# Patient Record
Sex: FEMALE | Race: BLACK OR AFRICAN AMERICAN | NOT HISPANIC OR LATINO | Employment: OTHER | URBAN - METROPOLITAN AREA
[De-identification: names, ages, dates, MRNs, and addresses within clinical notes are randomized per-mention and may not be internally consistent; named-entity substitution may affect disease eponyms.]

---

## 2023-02-17 PROBLEM — F10.10 ALCOHOL USE DISORDER, MILD: Status: ACTIVE | Noted: 2023-02-17

## 2023-02-17 PROBLEM — H40.1133 PRIMARY OPEN ANGLE GLAUCOMA OF BOTH EYES, SEVERE STAGE: Status: ACTIVE | Noted: 2023-02-17

## 2023-02-17 PROBLEM — H25.811 COMBINED FORM OF AGE-RELATED CATARACT, RIGHT EYE: Status: ACTIVE | Noted: 2023-02-17

## 2023-02-17 PROBLEM — K52.9 COLITIS: Status: ACTIVE | Noted: 2023-02-17

## 2023-02-17 PROBLEM — N18.9 CKD (CHRONIC KIDNEY DISEASE): Status: ACTIVE | Noted: 2023-02-17

## 2023-02-17 PROBLEM — J30.9 ALLERGIC RHINITIS DUE TO ALLERGEN: Status: ACTIVE | Noted: 2023-02-17

## 2023-02-17 PROBLEM — F32.A ANXIETY AND DEPRESSION: Status: ACTIVE | Noted: 2023-02-17

## 2023-02-17 PROBLEM — D22.9 SKIN MOLE: Status: ACTIVE | Noted: 2023-02-17

## 2023-02-17 PROBLEM — R00.2 PALPITATIONS: Status: ACTIVE | Noted: 2023-02-17

## 2023-02-17 PROBLEM — N76.0 VAGINITIS: Status: ACTIVE | Noted: 2023-02-17

## 2023-02-17 PROBLEM — H04.123 DRY EYES, BILATERAL: Status: ACTIVE | Noted: 2023-02-17

## 2023-02-17 PROBLEM — H01.006 BLEPHARITIS OF BOTH EYES: Status: ACTIVE | Noted: 2023-02-17

## 2023-02-17 PROBLEM — Q82.8 ACCESSORY SKIN TAGS: Status: ACTIVE | Noted: 2023-02-17

## 2023-02-17 PROBLEM — E78.5 DYSLIPIDEMIA: Status: ACTIVE | Noted: 2023-02-17

## 2023-02-17 PROBLEM — H47.013 ISCHEMIC OPTIC NEUROPATHY, BILATERAL: Status: ACTIVE | Noted: 2023-02-17

## 2023-02-17 PROBLEM — E61.2 MAGNESIUM DEFICIENCY: Status: ACTIVE | Noted: 2023-02-17

## 2023-02-17 PROBLEM — L67.9 HAIR CHANGES: Status: ACTIVE | Noted: 2023-02-17

## 2023-02-17 PROBLEM — E83.52 HYPERCALCEMIA: Status: ACTIVE | Noted: 2023-02-17

## 2023-02-17 PROBLEM — F41.9 ANXIETY AND DEPRESSION: Status: ACTIVE | Noted: 2023-02-17

## 2023-02-17 PROBLEM — H01.003 BLEPHARITIS OF BOTH EYES: Status: ACTIVE | Noted: 2023-02-17

## 2023-02-17 PROBLEM — H53.8 BLURRED VISION, RIGHT EYE: Status: ACTIVE | Noted: 2023-02-17

## 2023-02-17 PROBLEM — H40.1233 LOW-TENSION GLAUCOMA, BILATERAL, SEVERE STAGE: Status: ACTIVE | Noted: 2023-02-17

## 2023-02-17 PROBLEM — M25.562 LEFT KNEE PAIN: Status: ACTIVE | Noted: 2023-02-17

## 2023-02-17 PROBLEM — N89.8 VAGINAL DISCHARGE: Status: ACTIVE | Noted: 2023-02-17

## 2023-02-17 PROBLEM — E87.6 HYPOKALEMIA: Status: ACTIVE | Noted: 2023-02-17

## 2023-02-17 PROBLEM — R63.0 ANOREXIA: Status: ACTIVE | Noted: 2023-02-17

## 2023-02-17 PROBLEM — R23.2 HOT FLASHES: Status: ACTIVE | Noted: 2023-02-17

## 2023-02-17 PROBLEM — N76.4 ABSCESS OF LABIA: Status: ACTIVE | Noted: 2023-02-17

## 2023-02-17 PROBLEM — G62.9 NEUROPATHY: Status: ACTIVE | Noted: 2023-02-17

## 2023-02-17 PROBLEM — H25.812 COMBINED FORM OF AGE-RELATED CATARACT, LEFT EYE: Status: ACTIVE | Noted: 2023-02-17

## 2023-02-17 PROBLEM — I10 HTN (HYPERTENSION): Status: ACTIVE | Noted: 2023-02-17

## 2023-02-17 PROBLEM — I73.9 CLAUDICATION (CMS-HCC): Status: ACTIVE | Noted: 2023-02-17

## 2023-02-17 PROBLEM — R19.7 DIARRHEA: Status: ACTIVE | Noted: 2023-02-17

## 2023-02-17 PROBLEM — H40.059 ELEVATED IOP: Status: ACTIVE | Noted: 2023-02-17

## 2023-02-17 PROBLEM — R20.2 NUMBNESS AND TINGLING OF FOOT: Status: ACTIVE | Noted: 2023-02-17

## 2023-02-17 PROBLEM — L30.9 ECZEMA: Status: ACTIVE | Noted: 2023-02-17

## 2023-02-17 PROBLEM — D25.9 UTERINE LEIOMYOMA: Status: ACTIVE | Noted: 2023-02-17

## 2023-02-17 PROBLEM — H52.7 REFRACTION ERROR: Status: ACTIVE | Noted: 2023-02-17

## 2023-02-17 PROBLEM — R12 HEARTBURN SYMPTOM: Status: ACTIVE | Noted: 2023-02-17

## 2023-02-17 PROBLEM — R53.83 FATIGUE: Status: ACTIVE | Noted: 2023-02-17

## 2023-02-17 PROBLEM — N63.10 BREAST MASS, RIGHT: Status: ACTIVE | Noted: 2023-02-17

## 2023-02-17 PROBLEM — I73.9 PERIPHERAL VASCULAR DISEASE (CMS-HCC): Status: ACTIVE | Noted: 2023-02-17

## 2023-02-17 PROBLEM — E55.9 VITAMIN D DEFICIENCY, UNSPECIFIED: Status: ACTIVE | Noted: 2023-02-17

## 2023-02-17 PROBLEM — B35.1 ONYCHOMYCOSIS OF TOENAIL: Status: ACTIVE | Noted: 2023-02-17

## 2023-02-17 PROBLEM — E21.0 PRIMARY HYPERPARATHYROIDISM (MULTI): Status: ACTIVE | Noted: 2023-02-17

## 2023-02-17 PROBLEM — R10.11 ABDOMINAL PAIN, RIGHT UPPER QUADRANT: Status: ACTIVE | Noted: 2023-02-17

## 2023-02-17 PROBLEM — R20.0 NUMBNESS AND TINGLING OF FOOT: Status: ACTIVE | Noted: 2023-02-17

## 2023-02-17 PROBLEM — H25.11 AGE-RELATED NUCLEAR CATARACT OF RIGHT EYE: Status: ACTIVE | Noted: 2023-02-17

## 2023-02-17 RX ORDER — BRIMONIDINE TARTRATE 2 MG/ML
1 SOLUTION/ DROPS OPHTHALMIC EVERY 12 HOURS
COMMUNITY
Start: 2016-11-15

## 2023-02-17 RX ORDER — LATANOPROSTENE BUNOD 0.24 MG/ML
SOLUTION/ DROPS OPHTHALMIC
COMMUNITY
End: 2024-05-15 | Stop reason: HOSPADM

## 2023-02-17 RX ORDER — LISINOPRIL 20 MG/1
20 TABLET ORAL DAILY
COMMUNITY
Start: 2020-06-16 | End: 2024-05-15 | Stop reason: HOSPADM

## 2023-02-17 RX ORDER — ACETAMINOPHEN 325 MG/1
2 TABLET ORAL EVERY 4 HOURS PRN
COMMUNITY
Start: 2020-06-16 | End: 2024-05-15 | Stop reason: HOSPADM

## 2023-02-17 RX ORDER — ATORVASTATIN CALCIUM 20 MG/1
20 TABLET, FILM COATED ORAL DAILY
COMMUNITY
Start: 2020-04-17

## 2023-02-17 RX ORDER — FAMOTIDINE 20 MG/1
20 TABLET, FILM COATED ORAL 2 TIMES DAILY
COMMUNITY
End: 2024-05-15 | Stop reason: HOSPADM

## 2023-02-17 RX ORDER — FOLIC ACID 1 MG/1
1 TABLET ORAL DAILY
COMMUNITY
Start: 2022-05-30 | End: 2024-05-15 | Stop reason: HOSPADM

## 2023-02-17 RX ORDER — AMLODIPINE BESYLATE 10 MG/1
10 TABLET ORAL DAILY
COMMUNITY
Start: 2020-08-27 | End: 2024-05-15 | Stop reason: HOSPADM

## 2023-02-17 RX ORDER — CHOLECALCIFEROL (VITAMIN D3) 25 MCG
1 TABLET ORAL
COMMUNITY
Start: 2022-05-03

## 2023-02-17 RX ORDER — LANOLIN ALCOHOL/MO/W.PET/CERES
100 CREAM (GRAM) TOPICAL DAILY
COMMUNITY
Start: 2022-05-30

## 2023-04-14 ENCOUNTER — APPOINTMENT (OUTPATIENT)
Dept: PRIMARY CARE | Facility: CLINIC | Age: 63
End: 2023-04-14
Payer: MEDICAID

## 2024-05-07 ENCOUNTER — APPOINTMENT (OUTPATIENT)
Dept: RADIOLOGY | Facility: HOSPITAL | Age: 64
End: 2024-05-07
Payer: MEDICAID

## 2024-05-07 ENCOUNTER — HOSPITAL ENCOUNTER (INPATIENT)
Facility: HOSPITAL | Age: 64
LOS: 8 days | Discharge: NURSING, REHAB OR LONG-TERM CARE | End: 2024-05-15
Attending: EMERGENCY MEDICINE | Admitting: INTERNAL MEDICINE
Payer: MEDICAID

## 2024-05-07 DIAGNOSIS — G62.9 NEUROPATHY: ICD-10-CM

## 2024-05-07 DIAGNOSIS — A41.9 SEPSIS, DUE TO UNSPECIFIED ORGANISM, UNSPECIFIED WHETHER ACUTE ORGAN DYSFUNCTION PRESENT (MULTI): Primary | ICD-10-CM

## 2024-05-07 DIAGNOSIS — N39.0 URINARY TRACT INFECTION WITHOUT HEMATURIA, SITE UNSPECIFIED: ICD-10-CM

## 2024-05-07 DIAGNOSIS — J90 PLEURAL EFFUSION DUE TO ANOTHER DISORDER: ICD-10-CM

## 2024-05-07 DIAGNOSIS — J96.21 ACUTE ON CHRONIC RESPIRATORY FAILURE WITH HYPOXIA (MULTI): ICD-10-CM

## 2024-05-07 DIAGNOSIS — F32.89 OTHER DEPRESSION: ICD-10-CM

## 2024-05-07 DIAGNOSIS — C15.9 MALIGNANT NEOPLASM OF ESOPHAGUS, UNSPECIFIED LOCATION (MULTI): ICD-10-CM

## 2024-05-07 DIAGNOSIS — J18.9 HEALTHCARE-ASSOCIATED PNEUMONIA: ICD-10-CM

## 2024-05-07 DIAGNOSIS — J96.01 ACUTE RESPIRATORY FAILURE WITH HYPOXIA (MULTI): ICD-10-CM

## 2024-05-07 DIAGNOSIS — I50.9 CONGESTIVE HEART FAILURE, UNSPECIFIED HF CHRONICITY, UNSPECIFIED HEART FAILURE TYPE (MULTI): ICD-10-CM

## 2024-05-07 LAB
ALBUMIN SERPL-MCNC: 3 G/DL (ref 3.5–5)
ALP BLD-CCNC: 90 U/L (ref 35–125)
ALT SERPL-CCNC: 9 U/L (ref 5–40)
ANION GAP BLDA CALCULATED.4IONS-SCNC: 12 MMO/L (ref 10–25)
ANION GAP BLDA CALCULATED.4IONS-SCNC: 9 MMO/L (ref 10–25)
ANION GAP SERPL CALC-SCNC: 12 MMOL/L
APPEARANCE UR: ABNORMAL
ARTERIAL PATENCY WRIST A: POSITIVE
AST SERPL-CCNC: 14 U/L (ref 5–40)
BACTERIA #/AREA URNS AUTO: ABNORMAL /HPF
BASE EXCESS BLDA CALC-SCNC: -0.7 MMOL/L (ref -2–3)
BASE EXCESS BLDA CALC-SCNC: 1.1 MMOL/L (ref -2–3)
BASOPHILS # BLD AUTO: 0.02 X10*3/UL (ref 0–0.1)
BASOPHILS NFR BLD AUTO: 0.3 %
BILIRUB SERPL-MCNC: 0.3 MG/DL (ref 0.1–1.2)
BILIRUB UR STRIP.AUTO-MCNC: NEGATIVE MG/DL
BODY TEMPERATURE: 37 DEGREES CELSIUS
BODY TEMPERATURE: 37 DEGREES CELSIUS
BUN SERPL-MCNC: 33 MG/DL (ref 8–25)
CA-I BLDA-SCNC: 1.24 MMOL/L (ref 1.1–1.33)
CA-I BLDA-SCNC: 1.31 MMOL/L (ref 1.1–1.33)
CALCIUM SERPL-MCNC: 9.7 MG/DL (ref 8.5–10.4)
CHLORIDE BLDA-SCNC: 104 MMOL/L (ref 98–107)
CHLORIDE BLDA-SCNC: 105 MMOL/L (ref 98–107)
CHLORIDE SERPL-SCNC: 100 MMOL/L (ref 97–107)
CO2 SERPL-SCNC: 24 MMOL/L (ref 24–31)
COLOR UR: ABNORMAL
CREAT SERPL-MCNC: 0.9 MG/DL (ref 0.4–1.6)
D DIMER PPP FEU-MCNC: 3.09 MG/L FEU (ref 0.19–0.5)
EGFRCR SERPLBLD CKD-EPI 2021: 72 ML/MIN/1.73M*2
EOSINOPHIL # BLD AUTO: 0.3 X10*3/UL (ref 0–0.7)
EOSINOPHIL NFR BLD AUTO: 3.8 %
ERYTHROCYTE [DISTWIDTH] IN BLOOD BY AUTOMATED COUNT: 18.8 % (ref 11.5–14.5)
FLUAV RNA RESP QL NAA+PROBE: NOT DETECTED
FLUBV RNA RESP QL NAA+PROBE: NOT DETECTED
GLUCOSE BLDA-MCNC: 102 MG/DL (ref 74–99)
GLUCOSE BLDA-MCNC: 155 MG/DL (ref 74–99)
GLUCOSE SERPL-MCNC: 96 MG/DL (ref 65–99)
GLUCOSE UR STRIP.AUTO-MCNC: NORMAL MG/DL
HCO3 BLDA-SCNC: 21.2 MMOL/L (ref 22–26)
HCO3 BLDA-SCNC: 25 MMOL/L (ref 22–26)
HCT VFR BLD AUTO: 32.5 % (ref 36–46)
HCT VFR BLD EST: 28 % (ref 36–46)
HCT VFR BLD EST: 33 % (ref 36–46)
HGB BLD-MCNC: 10 G/DL (ref 12–16)
HGB BLDA-MCNC: 11 G/DL (ref 12–16)
HGB BLDA-MCNC: 9.4 G/DL (ref 12–16)
HOLD SPECIMEN: NORMAL
HOLD SPECIMEN: NORMAL
IMM GRANULOCYTES # BLD AUTO: 0.03 X10*3/UL (ref 0–0.7)
IMM GRANULOCYTES NFR BLD AUTO: 0.4 % (ref 0–0.9)
INHALED O2 CONCENTRATION: 80 %
INHALED O2 CONCENTRATION: 90 %
KETONES UR STRIP.AUTO-MCNC: NEGATIVE MG/DL
LACTATE BLDA-SCNC: 0.6 MMOL/L (ref 0.4–2)
LACTATE BLDA-SCNC: 3.3 MMOL/L (ref 0.4–2)
LEUKOCYTE ESTERASE UR QL STRIP.AUTO: ABNORMAL
LYMPHOCYTES # BLD AUTO: 0.64 X10*3/UL (ref 1.2–4.8)
LYMPHOCYTES NFR BLD AUTO: 8.1 %
MAGNESIUM SERPL-MCNC: 1.7 MG/DL (ref 1.6–3.1)
MCH RBC QN AUTO: 25.3 PG (ref 26–34)
MCHC RBC AUTO-ENTMCNC: 30.8 G/DL (ref 32–36)
MCV RBC AUTO: 82 FL (ref 80–100)
MONOCYTES # BLD AUTO: 0.75 X10*3/UL (ref 0.1–1)
MONOCYTES NFR BLD AUTO: 9.5 %
MRSA DNA SPEC QL NAA+PROBE: NOT DETECTED
MUCOUS THREADS #/AREA URNS AUTO: ABNORMAL /LPF
NEUTROPHILS # BLD AUTO: 6.14 X10*3/UL (ref 1.2–7.7)
NEUTROPHILS NFR BLD AUTO: 77.9 %
NITRITE UR QL STRIP.AUTO: NEGATIVE
NRBC BLD-RTO: 0 /100 WBCS (ref 0–0)
NT-PROBNP SERPL-MCNC: 1649 PG/ML (ref 0–226)
OXYHGB MFR BLDA: 90.3 % (ref 94–98)
OXYHGB MFR BLDA: 97.1 % (ref 94–98)
PCO2 BLDA: 26 MM HG (ref 38–42)
PCO2 BLDA: 36 MM HG (ref 38–42)
PH BLDA: 7.45 PH (ref 7.38–7.42)
PH BLDA: 7.52 PH (ref 7.38–7.42)
PH UR STRIP.AUTO: 7 [PH]
PLATELET # BLD AUTO: 207 X10*3/UL (ref 150–450)
PO2 BLDA: 64 MM HG (ref 85–95)
PO2 BLDA: 93 MM HG (ref 85–95)
POTASSIUM BLDA-SCNC: 3.3 MMOL/L (ref 3.5–5.3)
POTASSIUM BLDA-SCNC: 4 MMOL/L (ref 3.5–5.3)
POTASSIUM SERPL-SCNC: 4.2 MMOL/L (ref 3.4–5.1)
PROT SERPL-MCNC: 6.8 G/DL (ref 5.9–7.9)
PROT UR STRIP.AUTO-MCNC: NEGATIVE MG/DL
RBC # BLD AUTO: 3.96 X10*6/UL (ref 4–5.2)
RBC # UR STRIP.AUTO: NEGATIVE /UL
RBC #/AREA URNS AUTO: ABNORMAL /HPF
SAO2 % BLDA: 100 % (ref 94–100)
SAO2 % BLDA: 94 % (ref 94–100)
SARS-COV-2 RNA RESP QL NAA+PROBE: NOT DETECTED
SODIUM BLDA-SCNC: 134 MMOL/L (ref 136–145)
SODIUM BLDA-SCNC: 135 MMOL/L (ref 136–145)
SODIUM SERPL-SCNC: 136 MMOL/L (ref 133–145)
SP GR UR STRIP.AUTO: 1.01
SPECIMEN DRAWN FROM PATIENT: ABNORMAL
SQUAMOUS #/AREA URNS AUTO: ABNORMAL /HPF
TROPONIN T SERPL-MCNC: 14 NG/L
TROPONIN T SERPL-MCNC: 16 NG/L
TROPONIN T SERPL-MCNC: 16 NG/L
UROBILINOGEN UR STRIP.AUTO-MCNC: NORMAL MG/DL
WBC # BLD AUTO: 7.9 X10*3/UL (ref 4.4–11.3)
WBC #/AREA URNS AUTO: >50 /HPF
WBC CLUMPS #/AREA URNS AUTO: ABNORMAL /HPF

## 2024-05-07 PROCEDURE — 9420000001 HC RT PATIENT EDUCATION 5 MIN

## 2024-05-07 PROCEDURE — 94799 UNLISTED PULMONARY SVC/PX: CPT

## 2024-05-07 PROCEDURE — 2500000004 HC RX 250 GENERAL PHARMACY W/ HCPCS (ALT 636 FOR OP/ED): Performed by: INTERNAL MEDICINE

## 2024-05-07 PROCEDURE — 71045 X-RAY EXAM CHEST 1 VIEW: CPT | Performed by: STUDENT IN AN ORGANIZED HEALTH CARE EDUCATION/TRAINING PROGRAM

## 2024-05-07 PROCEDURE — 99285 EMERGENCY DEPT VISIT HI MDM: CPT | Mod: 25,CS

## 2024-05-07 PROCEDURE — 84132 ASSAY OF SERUM POTASSIUM: CPT | Performed by: EMERGENCY MEDICINE

## 2024-05-07 PROCEDURE — 84484 ASSAY OF TROPONIN QUANT: CPT | Performed by: EMERGENCY MEDICINE

## 2024-05-07 PROCEDURE — 99291 CRITICAL CARE FIRST HOUR: CPT

## 2024-05-07 PROCEDURE — 81003 URINALYSIS AUTO W/O SCOPE: CPT | Performed by: EMERGENCY MEDICINE

## 2024-05-07 PROCEDURE — 36600 WITHDRAWAL OF ARTERIAL BLOOD: CPT

## 2024-05-07 PROCEDURE — 71045 X-RAY EXAM CHEST 1 VIEW: CPT

## 2024-05-07 PROCEDURE — 94660 CPAP INITIATION&MGMT: CPT

## 2024-05-07 PROCEDURE — 96375 TX/PRO/DX INJ NEW DRUG ADDON: CPT

## 2024-05-07 PROCEDURE — 2500000004 HC RX 250 GENERAL PHARMACY W/ HCPCS (ALT 636 FOR OP/ED): Performed by: EMERGENCY MEDICINE

## 2024-05-07 PROCEDURE — 2500000001 HC RX 250 WO HCPCS SELF ADMINISTERED DRUGS (ALT 637 FOR MEDICARE OP): Performed by: EMERGENCY MEDICINE

## 2024-05-07 PROCEDURE — 2500000001 HC RX 250 WO HCPCS SELF ADMINISTERED DRUGS (ALT 637 FOR MEDICARE OP): Performed by: INTERNAL MEDICINE

## 2024-05-07 PROCEDURE — 99223 1ST HOSP IP/OBS HIGH 75: CPT | Performed by: NURSE PRACTITIONER

## 2024-05-07 PROCEDURE — 87899 AGENT NOS ASSAY W/OPTIC: CPT | Mod: WESLAB | Performed by: INTERNAL MEDICINE

## 2024-05-07 PROCEDURE — 87641 MR-STAPH DNA AMP PROBE: CPT | Performed by: INTERNAL MEDICINE

## 2024-05-07 PROCEDURE — 71260 CT THORAX DX C+: CPT

## 2024-05-07 PROCEDURE — 85025 COMPLETE CBC W/AUTO DIFF WBC: CPT | Performed by: EMERGENCY MEDICINE

## 2024-05-07 PROCEDURE — 2550000001 HC RX 255 CONTRASTS: Performed by: EMERGENCY MEDICINE

## 2024-05-07 PROCEDURE — 83880 ASSAY OF NATRIURETIC PEPTIDE: CPT | Performed by: EMERGENCY MEDICINE

## 2024-05-07 PROCEDURE — 36415 COLL VENOUS BLD VENIPUNCTURE: CPT | Performed by: EMERGENCY MEDICINE

## 2024-05-07 PROCEDURE — 96361 HYDRATE IV INFUSION ADD-ON: CPT

## 2024-05-07 PROCEDURE — 87449 NOS EACH ORGANISM AG IA: CPT | Mod: WESLAB | Performed by: INTERNAL MEDICINE

## 2024-05-07 PROCEDURE — 96374 THER/PROPH/DIAG INJ IV PUSH: CPT | Mod: 59

## 2024-05-07 PROCEDURE — 2020000001 HC ICU ROOM DAILY

## 2024-05-07 PROCEDURE — 87040 BLOOD CULTURE FOR BACTERIA: CPT | Mod: WESLAB | Performed by: EMERGENCY MEDICINE

## 2024-05-07 PROCEDURE — 99291 CRITICAL CARE FIRST HOUR: CPT | Mod: CS | Performed by: EMERGENCY MEDICINE

## 2024-05-07 PROCEDURE — 94664 DEMO&/EVAL PT USE INHALER: CPT

## 2024-05-07 PROCEDURE — 2500000002 HC RX 250 W HCPCS SELF ADMINISTERED DRUGS (ALT 637 FOR MEDICARE OP, ALT 636 FOR OP/ED): Performed by: INTERNAL MEDICINE

## 2024-05-07 PROCEDURE — 87636 SARSCOV2 & INF A&B AMP PRB: CPT | Performed by: EMERGENCY MEDICINE

## 2024-05-07 PROCEDURE — 2500000002 HC RX 250 W HCPCS SELF ADMINISTERED DRUGS (ALT 637 FOR MEDICARE OP, ALT 636 FOR OP/ED)

## 2024-05-07 PROCEDURE — 84132 ASSAY OF SERUM POTASSIUM: CPT | Performed by: INTERNAL MEDICINE

## 2024-05-07 PROCEDURE — 94640 AIRWAY INHALATION TREATMENT: CPT

## 2024-05-07 PROCEDURE — 2500000001 HC RX 250 WO HCPCS SELF ADMINISTERED DRUGS (ALT 637 FOR MEDICARE OP): Performed by: NURSE PRACTITIONER

## 2024-05-07 PROCEDURE — 87086 URINE CULTURE/COLONY COUNT: CPT | Mod: WESLAB | Performed by: EMERGENCY MEDICINE

## 2024-05-07 PROCEDURE — 2500000005 HC RX 250 GENERAL PHARMACY W/O HCPCS: Performed by: EMERGENCY MEDICINE

## 2024-05-07 PROCEDURE — 83735 ASSAY OF MAGNESIUM: CPT | Performed by: EMERGENCY MEDICINE

## 2024-05-07 PROCEDURE — 96365 THER/PROPH/DIAG IV INF INIT: CPT

## 2024-05-07 PROCEDURE — 85300 ANTITHROMBIN III ACTIVITY: CPT | Performed by: EMERGENCY MEDICINE

## 2024-05-07 RX ORDER — ACETYLCYSTEINE 200 MG/ML
3 SOLUTION ORAL; RESPIRATORY (INHALATION)
Status: DISCONTINUED | OUTPATIENT
Start: 2024-05-07 | End: 2024-05-11

## 2024-05-07 RX ORDER — HYDROXYZINE HYDROCHLORIDE 25 MG/1
25 TABLET, FILM COATED ORAL EVERY 6 HOURS PRN
Status: DISCONTINUED | OUTPATIENT
Start: 2024-05-07 | End: 2024-05-15 | Stop reason: HOSPADM

## 2024-05-07 RX ORDER — ATORVASTATIN CALCIUM 20 MG/1
20 TABLET, FILM COATED ORAL NIGHTLY
Status: DISCONTINUED | OUTPATIENT
Start: 2024-05-07 | End: 2024-05-15

## 2024-05-07 RX ORDER — FUROSEMIDE 10 MG/ML
40 INJECTION INTRAMUSCULAR; INTRAVENOUS ONCE
Status: COMPLETED | OUTPATIENT
Start: 2024-05-07 | End: 2024-05-07

## 2024-05-07 RX ORDER — ENOXAPARIN SODIUM 100 MG/ML
40 INJECTION SUBCUTANEOUS DAILY
Status: DISCONTINUED | OUTPATIENT
Start: 2024-05-07 | End: 2024-05-15 | Stop reason: HOSPADM

## 2024-05-07 RX ORDER — FENTANYL 50 UG/1
1 PATCH TRANSDERMAL
Status: DISCONTINUED | OUTPATIENT
Start: 2024-05-07 | End: 2024-05-15 | Stop reason: HOSPADM

## 2024-05-07 RX ORDER — VANCOMYCIN 1.5 G/300ML
1.5 INJECTION, SOLUTION INTRAVENOUS ONCE
Status: COMPLETED | OUTPATIENT
Start: 2024-05-07 | End: 2024-05-07

## 2024-05-07 RX ORDER — ACETAMINOPHEN 160 MG/5ML
650 SOLUTION ORAL EVERY 4 HOURS PRN
Status: DISCONTINUED | OUTPATIENT
Start: 2024-05-07 | End: 2024-05-15 | Stop reason: HOSPADM

## 2024-05-07 RX ORDER — LEVETIRACETAM 100 MG/ML
7.5 SOLUTION ORAL
COMMUNITY

## 2024-05-07 RX ORDER — METHOCARBAMOL 500 MG/1
500 TABLET, FILM COATED ORAL 3 TIMES DAILY
Status: DISCONTINUED | OUTPATIENT
Start: 2024-05-07 | End: 2024-05-07

## 2024-05-07 RX ORDER — HYDROXYZINE HYDROCHLORIDE 25 MG/1
25 TABLET, FILM COATED ORAL EVERY 6 HOURS PRN
COMMUNITY

## 2024-05-07 RX ORDER — GABAPENTIN 250 MG/5ML
250 SOLUTION ORAL 2 TIMES DAILY
Status: DISCONTINUED | OUTPATIENT
Start: 2024-05-07 | End: 2024-05-15 | Stop reason: HOSPADM

## 2024-05-07 RX ORDER — AMLODIPINE BESYLATE 10 MG/1
10 TABLET ORAL DAILY
Status: DISCONTINUED | OUTPATIENT
Start: 2024-05-07 | End: 2024-05-13

## 2024-05-07 RX ORDER — GABAPENTIN 250 MG/5ML
500 SOLUTION ORAL NIGHTLY
Status: DISCONTINUED | OUTPATIENT
Start: 2024-05-07 | End: 2024-05-15 | Stop reason: HOSPADM

## 2024-05-07 RX ORDER — METHOCARBAMOL 500 MG/1
500 TABLET, FILM COATED ORAL 3 TIMES DAILY
COMMUNITY
End: 2024-05-15 | Stop reason: HOSPADM

## 2024-05-07 RX ORDER — VANCOMYCIN HYDROCHLORIDE 1 G/20ML
INJECTION, POWDER, LYOPHILIZED, FOR SOLUTION INTRAVENOUS DAILY PRN
Status: DISCONTINUED | OUTPATIENT
Start: 2024-05-07 | End: 2024-05-12

## 2024-05-07 RX ORDER — ENOXAPARIN SODIUM 100 MG/ML
40 INJECTION SUBCUTANEOUS EVERY 24 HOURS
Status: DISCONTINUED | OUTPATIENT
Start: 2024-05-08 | End: 2024-05-07 | Stop reason: SDUPTHER

## 2024-05-07 RX ORDER — LISINOPRIL 20 MG/1
20 TABLET ORAL DAILY
Status: DISCONTINUED | OUTPATIENT
Start: 2024-05-07 | End: 2024-05-14

## 2024-05-07 RX ORDER — FAMOTIDINE 10 MG/ML
20 INJECTION INTRAVENOUS ONCE
Status: COMPLETED | OUTPATIENT
Start: 2024-05-07 | End: 2024-05-07

## 2024-05-07 RX ORDER — PANTOPRAZOLE SODIUM 40 MG/1
40 TABLET, DELAYED RELEASE ORAL
COMMUNITY
End: 2024-05-15 | Stop reason: HOSPADM

## 2024-05-07 RX ORDER — LEVETIRACETAM 100 MG/ML
750 SOLUTION ORAL 2 TIMES DAILY
Status: DISCONTINUED | OUTPATIENT
Start: 2024-05-07 | End: 2024-05-15 | Stop reason: HOSPADM

## 2024-05-07 RX ORDER — BRIMONIDINE TARTRATE 2 MG/ML
1 SOLUTION/ DROPS OPHTHALMIC 2 TIMES DAILY
Status: DISCONTINUED | OUTPATIENT
Start: 2024-05-07 | End: 2024-05-15 | Stop reason: HOSPADM

## 2024-05-07 RX ORDER — SENNOSIDES 8.6 MG/1
2 TABLET ORAL 2 TIMES DAILY
Status: DISCONTINUED | OUTPATIENT
Start: 2024-05-07 | End: 2024-05-08

## 2024-05-07 RX ORDER — METHOCARBAMOL 500 MG/1
500 TABLET, FILM COATED ORAL EVERY 8 HOURS PRN
Status: DISCONTINUED | OUTPATIENT
Start: 2024-05-07 | End: 2024-05-15 | Stop reason: HOSPADM

## 2024-05-07 RX ORDER — FUROSEMIDE 10 MG/ML
40 INJECTION INTRAMUSCULAR; INTRAVENOUS DAILY
Status: DISCONTINUED | OUTPATIENT
Start: 2024-05-07 | End: 2024-05-13

## 2024-05-07 RX ORDER — FENTANYL 12.5 UG/1
1 PATCH TRANSDERMAL
Status: DISCONTINUED | OUTPATIENT
Start: 2024-05-07 | End: 2024-05-15 | Stop reason: HOSPADM

## 2024-05-07 RX ORDER — ONDANSETRON HYDROCHLORIDE 8 MG/1
8 TABLET, FILM COATED ORAL EVERY 8 HOURS PRN
COMMUNITY

## 2024-05-07 RX ORDER — NAPROXEN SODIUM 220 MG/1
81 TABLET, FILM COATED ORAL ONCE
Status: DISCONTINUED | OUTPATIENT
Start: 2024-05-07 | End: 2024-05-15 | Stop reason: HOSPADM

## 2024-05-07 RX ORDER — NAPROXEN SODIUM 220 MG/1
324 TABLET, FILM COATED ORAL ONCE
Status: COMPLETED | OUTPATIENT
Start: 2024-05-07 | End: 2024-05-07

## 2024-05-07 RX ORDER — ACETAMINOPHEN 325 MG/1
650 TABLET ORAL EVERY 4 HOURS PRN
Status: DISCONTINUED | OUTPATIENT
Start: 2024-05-07 | End: 2024-05-15 | Stop reason: HOSPADM

## 2024-05-07 RX ORDER — LANOLIN ALCOHOL/MO/W.PET/CERES
100 CREAM (GRAM) TOPICAL DAILY
Status: DISCONTINUED | OUTPATIENT
Start: 2024-05-07 | End: 2024-05-15

## 2024-05-07 RX ORDER — PANTOPRAZOLE SODIUM 40 MG/1
40 TABLET, DELAYED RELEASE ORAL DAILY
Status: DISCONTINUED | OUTPATIENT
Start: 2024-05-07 | End: 2024-05-08

## 2024-05-07 RX ORDER — ONDANSETRON 4 MG/1
8 TABLET, FILM COATED ORAL EVERY 8 HOURS PRN
Status: DISCONTINUED | OUTPATIENT
Start: 2024-05-07 | End: 2024-05-15 | Stop reason: HOSPADM

## 2024-05-07 RX ORDER — SENNOSIDES 8.6 MG/1
2 TABLET ORAL 2 TIMES DAILY
COMMUNITY
End: 2024-05-15 | Stop reason: HOSPADM

## 2024-05-07 RX ORDER — VANCOMYCIN 1 G/200ML
1 INJECTION, SOLUTION INTRAVENOUS EVERY 24 HOURS
Status: DISCONTINUED | OUTPATIENT
Start: 2024-05-08 | End: 2024-05-12

## 2024-05-07 RX ORDER — ACETAMINOPHEN 650 MG/1
650 SUPPOSITORY RECTAL EVERY 4 HOURS PRN
Status: DISCONTINUED | OUTPATIENT
Start: 2024-05-07 | End: 2024-05-15 | Stop reason: HOSPADM

## 2024-05-07 RX ORDER — POLYETHYLENE GLYCOL 3350 17 G/17G
17 POWDER, FOR SOLUTION ORAL DAILY
Status: DISCONTINUED | OUTPATIENT
Start: 2024-05-07 | End: 2024-05-09

## 2024-05-07 RX ORDER — IPRATROPIUM BROMIDE AND ALBUTEROL SULFATE 2.5; .5 MG/3ML; MG/3ML
3 SOLUTION RESPIRATORY (INHALATION)
Status: DISCONTINUED | OUTPATIENT
Start: 2024-05-07 | End: 2024-05-13

## 2024-05-07 RX ADMIN — ACETAMINOPHEN 650 MG: 160 SOLUTION ORAL at 20:15

## 2024-05-07 RX ADMIN — SENNOSIDES 17.2 MG: 8.6 TABLET, FILM COATED ORAL at 20:15

## 2024-05-07 RX ADMIN — Medication 100 MG: at 09:52

## 2024-05-07 RX ADMIN — HYDROXYZINE HYDROCHLORIDE 25 MG: 25 TABLET, FILM COATED ORAL at 12:19

## 2024-05-07 RX ADMIN — FAMOTIDINE 20 MG: 10 INJECTION INTRAVENOUS at 02:35

## 2024-05-07 RX ADMIN — GABAPENTIN 250 MG: 250 SOLUTION ORAL at 16:43

## 2024-05-07 RX ADMIN — ASPIRIN 81 MG CHEWABLE TABLET 324 MG: 81 TABLET CHEWABLE at 02:35

## 2024-05-07 RX ADMIN — PIPERACILLIN SODIUM AND TAZOBACTAM SODIUM 3.38 G: 3; .375 INJECTION, SOLUTION INTRAVENOUS at 09:52

## 2024-05-07 RX ADMIN — ATORVASTATIN CALCIUM 20 MG: 20 TABLET, FILM COATED ORAL at 20:15

## 2024-05-07 RX ADMIN — LEVETIRACETAM 750 MG: 100 SOLUTION ORAL at 21:11

## 2024-05-07 RX ADMIN — ACETYLCYSTEINE 600 MG: 200 SOLUTION ORAL; RESPIRATORY (INHALATION) at 12:40

## 2024-05-07 RX ADMIN — ENOXAPARIN SODIUM 40 MG: 40 INJECTION SUBCUTANEOUS at 09:52

## 2024-05-07 RX ADMIN — IOHEXOL 75 ML: 350 INJECTION, SOLUTION INTRAVENOUS at 04:33

## 2024-05-07 RX ADMIN — IPRATROPIUM BROMIDE AND ALBUTEROL SULFATE 3 ML: 2.5; .5 SOLUTION RESPIRATORY (INHALATION) at 19:08

## 2024-05-07 RX ADMIN — SODIUM CHLORIDE 1698 ML: 900 INJECTION, SOLUTION INTRAVENOUS at 02:35

## 2024-05-07 RX ADMIN — ACETAMINOPHEN 650 MG: 325 TABLET ORAL at 09:52

## 2024-05-07 RX ADMIN — FENTANYL 1 PATCH: 50 PATCH TRANSDERMAL at 14:25

## 2024-05-07 RX ADMIN — FUROSEMIDE 40 MG: 10 INJECTION, SOLUTION INTRAMUSCULAR; INTRAVENOUS at 09:52

## 2024-05-07 RX ADMIN — AMLODIPINE BESYLATE 10 MG: 10 TABLET ORAL at 09:52

## 2024-05-07 RX ADMIN — ACETYLCYSTEINE 600 MG: 200 SOLUTION ORAL; RESPIRATORY (INHALATION) at 14:58

## 2024-05-07 RX ADMIN — IPRATROPIUM BROMIDE AND ALBUTEROL SULFATE 3 ML: 2.5; .5 SOLUTION RESPIRATORY (INHALATION) at 14:58

## 2024-05-07 RX ADMIN — IPRATROPIUM BROMIDE AND ALBUTEROL SULFATE 3 ML: 2.5; .5 SOLUTION RESPIRATORY (INHALATION) at 23:00

## 2024-05-07 RX ADMIN — FUROSEMIDE 40 MG: 10 INJECTION, SOLUTION INTRAMUSCULAR; INTRAVENOUS at 04:58

## 2024-05-07 RX ADMIN — METHOCARBAMOL 500 MG: 500 TABLET ORAL at 12:19

## 2024-05-07 RX ADMIN — LISINOPRIL 20 MG: 20 TABLET ORAL at 09:52

## 2024-05-07 RX ADMIN — LEVETIRACETAM 750 MG: 100 SOLUTION ORAL at 12:18

## 2024-05-07 RX ADMIN — GABAPENTIN 500 MG: 250 SOLUTION ORAL at 21:24

## 2024-05-07 RX ADMIN — PIPERACILLIN SODIUM AND TAZOBACTAM SODIUM 3.38 G: 3; .375 INJECTION, SOLUTION INTRAVENOUS at 21:11

## 2024-05-07 RX ADMIN — PIPERACILLIN SODIUM AND TAZOBACTAM SODIUM 3.38 G: 3; .375 INJECTION, SOLUTION INTRAVENOUS at 16:08

## 2024-05-07 RX ADMIN — BRIMONIDINE TARTRATE 1 DROP: 2 SOLUTION/ DROPS OPHTHALMIC at 21:11

## 2024-05-07 RX ADMIN — Medication 40 L/MIN: at 20:00

## 2024-05-07 RX ADMIN — Medication 35 L/MIN: at 02:15

## 2024-05-07 RX ADMIN — Medication 40 L/MIN: at 08:00

## 2024-05-07 RX ADMIN — FENTANYL 1 PATCH: 12 PATCH TRANSDERMAL at 14:25

## 2024-05-07 RX ADMIN — IPRATROPIUM BROMIDE AND ALBUTEROL SULFATE 3 ML: 2.5; .5 SOLUTION RESPIRATORY (INHALATION) at 12:39

## 2024-05-07 RX ADMIN — VANCOMYCIN 1.5 G: 1.5 INJECTION, SOLUTION INTRAVENOUS at 03:20

## 2024-05-07 RX ADMIN — HYDROXYZINE HYDROCHLORIDE 25 MG: 25 TABLET, FILM COATED ORAL at 20:15

## 2024-05-07 RX ADMIN — ACETYLCYSTEINE 600 MG: 200 SOLUTION ORAL; RESPIRATORY (INHALATION) at 19:08

## 2024-05-07 SDOH — SOCIAL STABILITY: SOCIAL INSECURITY: HAS ANYONE EVER THREATENED TO HURT YOUR FAMILY OR YOUR PETS?: NO

## 2024-05-07 SDOH — SOCIAL STABILITY: SOCIAL INSECURITY: ARE YOU OR HAVE YOU BEEN THREATENED OR ABUSED PHYSICALLY, EMOTIONALLY, OR SEXUALLY BY ANYONE?: NO

## 2024-05-07 SDOH — SOCIAL STABILITY: SOCIAL INSECURITY: WERE YOU ABLE TO COMPLETE ALL THE BEHAVIORAL HEALTH SCREENINGS?: YES

## 2024-05-07 SDOH — SOCIAL STABILITY: SOCIAL INSECURITY: HAVE YOU HAD ANY THOUGHTS OF HARMING ANYONE ELSE?: NO

## 2024-05-07 SDOH — SOCIAL STABILITY: SOCIAL INSECURITY: DO YOU FEEL ANYONE HAS EXPLOITED OR TAKEN ADVANTAGE OF YOU FINANCIALLY OR OF YOUR PERSONAL PROPERTY?: NO

## 2024-05-07 SDOH — SOCIAL STABILITY: SOCIAL INSECURITY: HAVE YOU HAD THOUGHTS OF HARMING ANYONE ELSE?: NO

## 2024-05-07 SDOH — SOCIAL STABILITY: SOCIAL INSECURITY: DO YOU FEEL UNSAFE GOING BACK TO THE PLACE WHERE YOU ARE LIVING?: NO

## 2024-05-07 SDOH — SOCIAL STABILITY: SOCIAL INSECURITY: ABUSE: ADULT

## 2024-05-07 SDOH — SOCIAL STABILITY: SOCIAL INSECURITY: DOES ANYONE TRY TO KEEP YOU FROM HAVING/CONTACTING OTHER FRIENDS OR DOING THINGS OUTSIDE YOUR HOME?: NO

## 2024-05-07 SDOH — SOCIAL STABILITY: SOCIAL INSECURITY: ARE THERE ANY APPARENT SIGNS OF INJURIES/BEHAVIORS THAT COULD BE RELATED TO ABUSE/NEGLECT?: NO

## 2024-05-07 ASSESSMENT — ACTIVITIES OF DAILY LIVING (ADL)
JUDGMENT_ADEQUATE_SAFELY_COMPLETE_DAILY_ACTIVITIES: YES
FEEDING YOURSELF: NEEDS ASSISTANCE
TOILETING: NEEDS ASSISTANCE
JUDGMENT_ADEQUATE_SAFELY_COMPLETE_DAILY_ACTIVITIES: YES
WALKS IN HOME: NEEDS ASSISTANCE
ASSISTIVE_DEVICE: WHEELCHAIR
PATIENT'S MEMORY ADEQUATE TO SAFELY COMPLETE DAILY ACTIVITIES?: YES
LACK_OF_TRANSPORTATION: NO
HEARING - LEFT EAR: FUNCTIONAL
DRESSING YOURSELF: NEEDS ASSISTANCE
BATHING: NEEDS ASSISTANCE
PATIENT'S MEMORY ADEQUATE TO SAFELY COMPLETE DAILY ACTIVITIES?: YES
GROOMING: NEEDS ASSISTANCE
ADEQUATE_TO_COMPLETE_ADL: YES
ADEQUATE_TO_COMPLETE_ADL: YES
HEARING - RIGHT EAR: FUNCTIONAL

## 2024-05-07 ASSESSMENT — ENCOUNTER SYMPTOMS
GASTROINTESTINAL NEGATIVE: 1
NAUSEA: 0
HEADACHES: 1
NEUROLOGICAL NEGATIVE: 1
TROUBLE SWALLOWING: 1
VOMITING: 0
EYES NEGATIVE: 1
CONFUSION: 0
CHILLS: 0
CONSTITUTIONAL NEGATIVE: 1
ARTHRALGIAS: 1
ABDOMINAL PAIN: 0
MUSCULOSKELETAL NEGATIVE: 1
CARDIOVASCULAR NEGATIVE: 1
FEVER: 0
COUGH: 0
BACK PAIN: 1
CHEST TIGHTNESS: 1
PALPITATIONS: 0
DIZZINESS: 0
SHORTNESS OF BREATH: 1
NERVOUS/ANXIOUS: 1

## 2024-05-07 ASSESSMENT — PAIN - FUNCTIONAL ASSESSMENT
PAIN_FUNCTIONAL_ASSESSMENT: 0-10
PAIN_FUNCTIONAL_ASSESSMENT: FLACC (FACE, LEGS, ACTIVITY, CRY, CONSOLABILITY)
PAIN_FUNCTIONAL_ASSESSMENT: WONG-BAKER FACES
PAIN_FUNCTIONAL_ASSESSMENT: 0-10

## 2024-05-07 ASSESSMENT — LIFESTYLE VARIABLES
HOW OFTEN DO YOU HAVE A DRINK CONTAINING ALCOHOL: NEVER
SKIP TO QUESTIONS 9-10: 1
SUBSTANCE_ABUSE_PAST_12_MONTHS: NO
PRESCIPTION_ABUSE_PAST_12_MONTHS: NO
HOW OFTEN DO YOU HAVE 6 OR MORE DRINKS ON ONE OCCASION: NEVER
AUDIT-C TOTAL SCORE: 0
AUDIT-C TOTAL SCORE: 0
HOW MANY STANDARD DRINKS CONTAINING ALCOHOL DO YOU HAVE ON A TYPICAL DAY: PATIENT DOES NOT DRINK

## 2024-05-07 ASSESSMENT — PATIENT HEALTH QUESTIONNAIRE - PHQ9
SUM OF ALL RESPONSES TO PHQ9 QUESTIONS 1 & 2: 0
2. FEELING DOWN, DEPRESSED OR HOPELESS: NOT AT ALL
1. LITTLE INTEREST OR PLEASURE IN DOING THINGS: NOT AT ALL

## 2024-05-07 ASSESSMENT — PAIN SCALES - GENERAL
PAINLEVEL_OUTOF10: 10 - WORST POSSIBLE PAIN
PAINLEVEL_OUTOF10: 0 - NO PAIN
PAINLEVEL_OUTOF10: 10 - WORST POSSIBLE PAIN
PAINLEVEL_OUTOF10: 3

## 2024-05-07 ASSESSMENT — PAIN DESCRIPTION - DESCRIPTORS
DESCRIPTORS: ACHING
DESCRIPTORS: ACHING;DISCOMFORT

## 2024-05-07 ASSESSMENT — COLUMBIA-SUICIDE SEVERITY RATING SCALE - C-SSRS
6. HAVE YOU EVER DONE ANYTHING, STARTED TO DO ANYTHING, OR PREPARED TO DO ANYTHING TO END YOUR LIFE?: NO
1. IN THE PAST MONTH, HAVE YOU WISHED YOU WERE DEAD OR WISHED YOU COULD GO TO SLEEP AND NOT WAKE UP?: NO
2. HAVE YOU ACTUALLY HAD ANY THOUGHTS OF KILLING YOURSELF?: NO

## 2024-05-07 ASSESSMENT — PAIN DESCRIPTION - LOCATION: LOCATION: GENERALIZED

## 2024-05-07 ASSESSMENT — COGNITIVE AND FUNCTIONAL STATUS - GENERAL: PATIENT BASELINE BEDBOUND: YES

## 2024-05-07 NOTE — H&P
Internal Medicine H/P    Chief Complaint:  Hypoxia    History Of Present Illness  Debbi Segura is a 63 y.o. female presenting with hypoxia and shortness of breath from nursing home.  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  She also underwent debulking of airway in 2023.  She was admitted to [facility] for altered mental status and was found to have brain metastatic disease.  She was started on Keppra and had a gamma knife procedure.  Subsequently conversations have ensued with hospice.  She was discharged back to the skilled nursing facility.  Staff noted that she had sudden worsening of breathing and dyspnea at rest.  She was hypoxic.  She was sent to the hospital and found to have a large pleural effusion.  She was also febrile.  She was given antibiotics, placed on high flow oxygen, and admitted for further treatment.       Past Medical History  She has a past medical history of Age-related nuclear cataract, left eye (12/11/2015), Age-related nuclear cataract, left eye (12/11/2015), Age-related nuclear cataract, right eye (12/11/2015), Age-related nuclear cataract, right eye (12/11/2015), Age-related nuclear cataract, right eye (12/11/2015), Low-tension glaucoma, bilateral, indeterminate stage (11/15/2016), Low-tension glaucoma, unspecified eye, stage unspecified (12/11/2015), Low-tension glaucoma, unspecified eye, stage unspecified (12/11/2015), Other specified health status, and Personal history of other diseases of the musculoskeletal system and connective tissue (12/01/2014).  Patient also has a history of EtOH abuse and alcohol abuse.    Surgical History  She has a past surgical history that includes Tubal ligation (01/06/2017).     Social History  She has no history on file for tobacco use, alcohol use, and drug use.    Family History  Family History   Problem  Relation Name Age of Onset    Glaucoma Mother      Cancer Mother's Brother          Allergies  Glucose and Milk containing products (dairy)    Prior to Admission medications    Medication Sig Start Date End Date Taking? Authorizing Provider   acetaminophen (Tylenol) 325 mg tablet Take 2 tablets (650 mg) by mouth every 4 hours if needed (PAIN). 6/16/20   Historical Provider, MD   amLODIPine (Norvasc) 10 mg tablet Take 1 tablet (10 mg) by mouth once daily. 8/27/20   Historical Provider, MD   atorvastatin (Lipitor) 20 mg tablet Take 1 tablet (20 mg) by mouth once daily. 4/17/20   Historical Provider, MD   brimonidine (AlphaGAN P) 0.2 % ophthalmic solution Administer 1 drop into the right eye in the morning and 1 drop in the evening. 11/15/16   Historical Provider, MD   cholecalciferol (Vitamin D-3) 25 MCG (1000 UT) tablet Take 1 tablet (25 mcg) by mouth. CLARIFY DIRECTIONS 5/3/22   Historical Provider, MD   famotidine (Pepcid) 20 mg tablet Take 1 tablet (20 mg) by mouth in the morning and 1 tablet (20 mg) before bedtime.    Historical Provider, MD   folic acid (Folvite) 1 mg tablet Take 1 tablet (1 mg) by mouth once daily. 5/30/22   Historical Provider, MD   gabapentin (NEURONTIN ORAL) 5 mL by peg tube route once daily.    Historical Provider, MD   hydrOXYzine HCL (Atarax) 25 mg tablet 1 tablet (25 mg) by j-tube route every 6 hours if needed for anxiety.    Historical Provider, MD   latanoprostene bunod (Vyzulta) 0.024 % drops Administer into affected eye(s). CLARIFY DIRECTIONS AND IF PATIENT HAS COMPLETED USAGE FOR THREE WEEKS    Historical Provider, MD   levETIRAcetam (Keppra) 100 mg/mL solution 7.5 mL (750 mg) by g-tube route 2 times a day.    Historical Provider, MD   lisinopril 20 mg tablet Take 1 tablet (20 mg) by mouth once daily. 6/16/20   Historical Provider, MD   methocarbamol (Robaxin) 500 mg tablet 1 tablet (500 mg) by g-tube route 3 times a day.    Historical Provider, MD   ondansetron (Zofran) 8 mg tablet  "1 tablet (8 mg) by g-tube route every 8 hours if needed for nausea or vomiting.    Historical Provider, MD   pantoprazole (ProtoNix) 40 mg EC tablet Take 1 tablet (40 mg) by mouth once daily in the morning. Take before meals. Do not crush, chew, or split.    Historical Provider, MD   sennosides (Senokot) 8.6 mg tablet Take 2 tablets (17.2 mg) by mouth 2 times a day.    Historical Provider, MD   thiamine 100 mg tablet Take 1 tablet (100 mg) by mouth once daily. 5/30/22   Historical Provider, MD   UNABLE TO FIND phoressa eye drops  CLARIFY DIRECTIONS FOR USE    Historical Provider, MD         Review of Systems   Constitutional: Negative.    HENT: Negative.     Eyes: Negative.    Respiratory:  Positive for shortness of breath.    Cardiovascular: Negative.    Gastrointestinal: Negative.    Musculoskeletal: Negative.    Skin: Negative.    Neurological: Negative.    Psychiatric/Behavioral:  The patient is nervous/anxious.        Last Recorded Vitals  Blood pressure 112/77, pulse (!) 109, temperature 36.4 °C (97.6 °F), temperature source Oral, resp. rate (!) 24, height 1.676 m (5' 6\"), weight 56.6 kg (124 lb 12.5 oz), SpO2 97%.     Physical Exam       Constitutional:       Appearance: Elderly, asthenic build, appears anxious.  HENT:      Head: Normocephalic and atraumatic.   Eyes:      Extraocular Movements: Extraocular movements intact.      Pupils: Pupils are equal, round, and reactive to light.   Cardiovascular:      Rate and Rhythm: Normal rate and regular rhythm.      Pulses: Normal pulses.      Heart sounds: Normal heart sounds.   Pulmonary:      Effort: Pulmonary effort is normal.      Breath sounds: Normal breath sounds.  Diminished bases, no wheeze.  Abdominal:      General: Abdomen is flat. Bowel sounds are normal.      Palpations: Abdomen is soft.   Musculoskeletal:         General: Normal range of motion.      Cervical back: Normal range of motion and neck supple.   Skin:     General: Skin is warm and dry. "   Neurological:      General: No focal deficit present.      Mental Status:  Alert, oriented x 2-3, moves all extremities.    Relevant Results    Results for orders placed or performed during the hospital encounter of 05/07/24 (from the past 24 hour(s))   Sars-CoV-2 PCR   Result Value Ref Range    Coronavirus 2019, PCR Not Detected Not Detected   Influenza A, and B PCR   Result Value Ref Range    Flu A Result Not Detected Not Detected    Flu B Result Not Detected Not Detected   Blood Gas Arterial Full Panel   Result Value Ref Range    POCT pH, Arterial 7.45 (H) 7.38 - 7.42 pH    POCT pCO2, Arterial 36 (L) 38 - 42 mm Hg    POCT pO2, Arterial 93 85 - 95 mm Hg    POCT SO2, Arterial 100 94 - 100 %    POCT Oxy Hemoglobin, Arterial 97.1 94.0 - 98.0 %    POCT Hematocrit Calculated, Arterial 28.0 (L) 36.0 - 46.0 %    POCT Sodium, Arterial 134 (L) 136 - 145 mmol/L    POCT Potassium, Arterial 4.0 3.5 - 5.3 mmol/L    POCT Chloride, Arterial 104 98 - 107 mmol/L    POCT Ionized Calcium, Arterial 1.24 1.10 - 1.33 mmol/L    POCT Glucose, Arterial 102 (H) 74 - 99 mg/dL    POCT Lactate, Arterial 0.6 0.4 - 2.0 mmol/L    POCT Base Excess, Arterial 1.1 -2.0 - 3.0 mmol/L    POCT HCO3 Calculated, Arterial 25.0 22.0 - 26.0 mmol/L    POCT Hemoglobin, Arterial 9.4 (L) 12.0 - 16.0 g/dL    POCT Anion Gap, Arterial 9 (L) 10 - 25 mmo/L    Patient Temperature 37.0 degrees Celsius    FiO2 90 %    Site of Arterial Puncture Radial Right     Aamir's Test Positive    Urinalysis with Reflex Culture and Microscopic   Result Value Ref Range    Color, Urine Light-Yellow Light-Yellow, Yellow, Dark-Yellow    Appearance, Urine Turbid (N) Clear    Specific Gravity, Urine 1.013 1.005 - 1.035    pH, Urine 7.0 5.0, 5.5, 6.0, 6.5, 7.0, 7.5, 8.0    Protein, Urine NEGATIVE NEGATIVE, 10 (TRACE), 20 (TRACE) mg/dL    Glucose, Urine Normal Normal mg/dL    Blood, Urine NEGATIVE NEGATIVE    Ketones, Urine NEGATIVE NEGATIVE mg/dL    Bilirubin, Urine NEGATIVE NEGATIVE     Urobilinogen, Urine Normal Normal mg/dL    Nitrite, Urine NEGATIVE NEGATIVE    Leukocyte Esterase, Urine 500 Nidia/µL (A) NEGATIVE   Microscopic Only, Urine   Result Value Ref Range    WBC, Urine >50 (A) 1-5, NONE /HPF    WBC Clumps, Urine FEW Reference range not established. /HPF    RBC, Urine 3-5 NONE, 1-2, 3-5 /HPF    Squamous Epithelial Cells, Urine 1-9 (SPARSE) Reference range not established. /HPF    Bacteria, Urine 4+ (A) NONE SEEN /HPF    Mucus, Urine FEW Reference range not established. /LPF   CBC and Auto Differential   Result Value Ref Range    WBC 7.9 4.4 - 11.3 x10*3/uL    nRBC 0.0 0.0 - 0.0 /100 WBCs    RBC 3.96 (L) 4.00 - 5.20 x10*6/uL    Hemoglobin 10.0 (L) 12.0 - 16.0 g/dL    Hematocrit 32.5 (L) 36.0 - 46.0 %    MCV 82 80 - 100 fL    MCH 25.3 (L) 26.0 - 34.0 pg    MCHC 30.8 (L) 32.0 - 36.0 g/dL    RDW 18.8 (H) 11.5 - 14.5 %    Platelets 207 150 - 450 x10*3/uL    Neutrophils % 77.9 40.0 - 80.0 %    Immature Granulocytes %, Automated 0.4 0.0 - 0.9 %    Lymphocytes % 8.1 13.0 - 44.0 %    Monocytes % 9.5 2.0 - 10.0 %    Eosinophils % 3.8 0.0 - 6.0 %    Basophils % 0.3 0.0 - 2.0 %    Neutrophils Absolute 6.14 1.20 - 7.70 x10*3/uL    Immature Granulocytes Absolute, Automated 0.03 0.00 - 0.70 x10*3/uL    Lymphocytes Absolute 0.64 (L) 1.20 - 4.80 x10*3/uL    Monocytes Absolute 0.75 0.10 - 1.00 x10*3/uL    Eosinophils Absolute 0.30 0.00 - 0.70 x10*3/uL    Basophils Absolute 0.02 0.00 - 0.10 x10*3/uL   Comprehensive Metabolic Panel   Result Value Ref Range    Glucose 96 65 - 99 mg/dL    Sodium 136 133 - 145 mmol/L    Potassium 4.2 3.4 - 5.1 mmol/L    Chloride 100 97 - 107 mmol/L    Bicarbonate 24 24 - 31 mmol/L    Urea Nitrogen 33 (H) 8 - 25 mg/dL    Creatinine 0.90 0.40 - 1.60 mg/dL    eGFR 72 >60 mL/min/1.73m*2    Calcium 9.7 8.5 - 10.4 mg/dL    Albumin 3.0 (L) 3.5 - 5.0 g/dL    Alkaline Phosphatase 90 35 - 125 U/L    Total Protein 6.8 5.9 - 7.9 g/dL    AST 14 5 - 40 U/L    Bilirubin, Total 0.3 0.1 - 1.2  mg/dL    ALT 9 5 - 40 U/L    Anion Gap 12 <=19 mmol/L   Magnesium   Result Value Ref Range    Magnesium 1.70 1.60 - 3.10 mg/dL   NT Pro-BNP   Result Value Ref Range    PROBNP 1,649 (H) 0 - 226 pg/mL   Serial Troponin, Initial ([facility])   Result Value Ref Range    Troponin T, High Sensitivity 16 (HH) <=14 ng/L   D-dimer, quantitative   Result Value Ref Range    D-Dimer Non VTE, Quant (mg/L FEU) 3.09 (H) 0.19 - 0.50 mg/L FEU   Serial Troponin, 2 Hour ([facility])   Result Value Ref Range    Troponin T, High Sensitivity 14 <=14 ng/L   Serial Troponin, 6 Hour ([facility])   Result Value Ref Range    Troponin T, High Sensitivity 16 (HH) <=14 ng/L          Principal Problem:    Sepsis, due to unspecified organism, unspecified whether acute organ dysfunction present (Multi)        Assessment and Plan    Principal Problem:    Sepsis, due to unspecified organism, unspecified whether acute organ dysfunction present (Multi)    Acute hypoxemic respiratory failure -likely secondary to large effusion.  Continue with supportive measures, currently on high flow oxygen.  Aerosol treatments ordered.  Pulmonary consult, may require thoracentesis.  Metastatic esophageal cancer -will consult palliative care to assist with goals of care conversation.  Anxiety disorder -situational, hydroxyzine as ordered.  Brain metastatic disease -continue with Keppra.    Guarded prognosis.      Russell Deal MD  05/07/2410:38 AM

## 2024-05-07 NOTE — CONSULTS
"Vancomycin Dosing by Pharmacy- INITIAL    Debbi Segura is a 63 y.o. year old female who Pharmacy has been consulted for vancomycin dosing for pneumonia. Based on the patient's indication and renal status this patient will be dosed based on a goal AUC of 400-600.     Renal function is currently stable.    Visit Vitals  /77 (BP Location: Left arm, Patient Position: Lying)   Pulse (!) 109   Temp 36.5 °C (97.7 °F) (Axillary)   Resp 22        Lab Results   Component Value Date    CREATININE 0.90 05/07/2024    CREATININE 1.28 (H) 11/10/2022    CREATININE 1.21 (H) 08/04/2022    CREATININE 1.17 (H) 08/02/2022    CREATININE 1.19 (H) 04/28/2022        Patient weight is No results found for: \"PTWEIGHT\"    No results found for: \"CULTURE\"     I/O last 3 completed shifts:  In: 1300 (23 mL/kg) [IV Piggyback:1300]  Out: - (0 mL/kg)   Weight: 56.6 kg   [unfilled]    Lab Results   Component Value Date    PATIENTTEMP 37.0 05/07/2024          Assessment/Plan     Patient has already been given a loading dose of 1500 mg.  Will initiate vancomycin maintenance,  1000 mg every 24 hours.    This dosing regimen is predicted by [employer] to result in the following pharmacokinetic parameters:    Loading dose: 1500mg  Regimen: 1000 mg IV every 24 hours.  Start time: 15:20 on 05/07/2024  Exposure target: AUC24 (range)400-600 mg/L.hr   AUC24,ss: 409 mg/L.hr  Probability of AUC24 > 400: 53 %  Ctrough,ss: 11.4 mg/L  Probability of Ctrough,ss > 20: 11 %  Probability of nephrotoxicity (Lodise TROY 2009): 7 %    Follow-up level will be ordered on 5/8 at 0500 unless clinically indicated sooner.  Will continue to monitor renal function daily while on vancomycin and order serum creatinine at least every 48 hours if not already ordered.  Follow for continued vancomycin needs, clinical response, and signs/symptoms of toxicity.       Mariana Valdez, PharmD   "

## 2024-05-07 NOTE — CONSULTS
Inpatient consult to Infectious Diseases  Consult performed by: Dylon Blackmon MD  Consult ordered by: Russell Deal MD          Primary MD: John Tabares MD    Reason For Consult  Pneumonia    History Of Present Illness  Debbi Segura is a 63 y.o. female presenting with shortness of breath.  She has a background history of metastatic esophageal cancer.  Onset was gradual, constant, interval worsening.  She was found to be hypoxic, and is on high flow oxygen.  She had a CT scan done which was remarkable for COPD, left sided lung collapse, with patchy infiltrate.  She is on IV Zosyn.  She denies any significant cough.  She denies any chest pain.  She denies any fever or chills.  She denies any nausea vomiting or diarrhea.     Past Medical History  She has a past medical history of Age-related nuclear cataract, left eye (12/11/2015), Age-related nuclear cataract, left eye (12/11/2015), Age-related nuclear cataract, right eye (12/11/2015), Age-related nuclear cataract, right eye (12/11/2015), Age-related nuclear cataract, right eye (12/11/2015), Low-tension glaucoma, bilateral, indeterminate stage (11/15/2016), Low-tension glaucoma, unspecified eye, stage unspecified (12/11/2015), Low-tension glaucoma, unspecified eye, stage unspecified (12/11/2015), Other specified health status, and Personal history of other diseases of the musculoskeletal system and connective tissue (12/01/2014).    Surgical History  She has a past surgical history that includes Tubal ligation (01/06/2017).     Social History     Occupational History    Not on file   Tobacco Use    Smoking status: Not on file    Smokeless tobacco: Not on file   Substance and Sexual Activity    Alcohol use: Not on file    Drug use: Not on file    Sexual activity: Not on file     Travel History   Travel since 04/07/24    No documented travel since 04/07/24           Family History  Family History   Problem Relation Name Age of Onset    Glaucoma Mother       Cancer Mother's Brother       Allergies  Glucose and Milk containing products (dairy)     Immunization History   Administered Date(s) Administered    Influenza, Unspecified 11/08/2017     Medications  Home medications:  Medications Prior to Admission   Medication Sig Dispense Refill Last Dose    acetaminophen (Tylenol) 325 mg tablet Take 2 tablets (650 mg) by mouth every 4 hours if needed (PAIN).       amLODIPine (Norvasc) 10 mg tablet Take 1 tablet (10 mg) by mouth once daily.       atorvastatin (Lipitor) 20 mg tablet Take 1 tablet (20 mg) by mouth once daily.       brimonidine (AlphaGAN P) 0.2 % ophthalmic solution Administer 1 drop into the right eye in the morning and 1 drop in the evening.       cholecalciferol (Vitamin D-3) 25 MCG (1000 UT) tablet Take 1 tablet (25 mcg) by mouth. CLARIFY DIRECTIONS       famotidine (Pepcid) 20 mg tablet Take 1 tablet (20 mg) by mouth in the morning and 1 tablet (20 mg) before bedtime.       folic acid (Folvite) 1 mg tablet Take 1 tablet (1 mg) by mouth once daily.       gabapentin (NEURONTIN ORAL) 5 mL by peg tube route once daily.       hydrOXYzine HCL (Atarax) 25 mg tablet 1 tablet (25 mg) by j-tube route every 6 hours if needed for anxiety.       latanoprostene bunod (Vyzulta) 0.024 % drops Administer into affected eye(s). CLARIFY DIRECTIONS AND IF PATIENT HAS COMPLETED USAGE FOR THREE WEEKS       levETIRAcetam (Keppra) 100 mg/mL solution 7.5 mL (750 mg) by g-tube route 2 times a day.       lisinopril 20 mg tablet Take 1 tablet (20 mg) by mouth once daily.       methocarbamol (Robaxin) 500 mg tablet 1 tablet (500 mg) by g-tube route 3 times a day.       ondansetron (Zofran) 8 mg tablet 1 tablet (8 mg) by g-tube route every 8 hours if needed for nausea or vomiting.       pantoprazole (ProtoNix) 40 mg EC tablet Take 1 tablet (40 mg) by mouth once daily in the morning. Take before meals. Do not crush, chew, or split.       sennosides (Senokot) 8.6 mg tablet Take 2 tablets  "(17.2 mg) by mouth 2 times a day.       thiamine 100 mg tablet Take 1 tablet (100 mg) by mouth once daily.       UNABLE TO FIND phoressa eye drops  CLARIFY DIRECTIONS FOR USE        Current medications:  Scheduled medications  acetylcysteine, 3 mL, nebulization, 4x daily  amLODIPine, 10 mg, oral, Daily  aspirin, 81 mg, oral, Once  atorvastatin, 20 mg, oral, Nightly  brimonidine, 1 drop, Right Eye, BID  enoxaparin, 40 mg, subcutaneous, Daily  furosemide, 40 mg, intravenous, Daily  ipratropium-albuteroL, 3 mL, nebulization, q4h while awake  levETIRAcetam, 750 mg, g-tube, BID  lisinopril, 20 mg, oral, Daily  methocarbamol, 500 mg, g-tube, TID  oxygen, , inhalation, Continuous - Inhalation  pantoprazole, 40 mg, oral, Daily  piperacillin-tazobactam, 3.375 g, intravenous, q6h  polyethylene glycol, 17 g, oral, Daily  sennosides, 2 tablet, oral, BID  thiamine, 100 mg, oral, Daily      Continuous medications     PRN medications  PRN medications: acetaminophen **OR** acetaminophen **OR** acetaminophen, hydrOXYzine HCL, ondansetron    Review of Systems   Respiratory:  Positive for shortness of breath. Negative for cough.    All other systems reviewed and are negative.       Objective  Range of Vitals (last 24 hours)  Heart Rate:  [105-117]   Temp:  [36.4 °C (97.6 °F)-36.5 °C (97.7 °F)]   Resp:  [19-37]   BP: ()/(75-98)   Height:  [167.6 cm (5' 6\")]   Weight:  [56.6 kg (124 lb 12.5 oz)]   SpO2:  [94 %-100 %]   Daily Weight  05/07/24 : 56.6 kg (124 lb 12.5 oz)    Body mass index is 20.14 kg/m².     Physical Exam  Constitutional:       General: She is awake.      Appearance: She is ill-appearing.   HENT:      Head: Normocephalic and atraumatic.      Right Ear: External ear normal.      Left Ear: External ear normal.      Nose: Nose normal.   Eyes:      General: No scleral icterus.     Extraocular Movements: Extraocular movements intact.   Cardiovascular:      Rate and Rhythm: Tachycardia present.      Heart sounds: Normal " "heart sounds, S1 normal and S2 normal.   Pulmonary:      Breath sounds: Decreased breath sounds present.   Abdominal:      General: Bowel sounds are normal.      Palpations: Abdomen is soft.   Musculoskeletal:      Cervical back: Normal range of motion and neck supple.      Right lower leg: No edema.      Left lower leg: No edema.   Skin:     General: Skin is warm and dry.   Neurological:      Mental Status: She is alert.   Psychiatric:         Behavior: Behavior normal. Behavior is cooperative.          Relevant Results  Outside Hospital Results    Labs  Results from last 72 hours   Lab Units 05/07/24  0222   WBC AUTO x10*3/uL 7.9   HEMOGLOBIN g/dL 10.0*   HEMATOCRIT % 32.5*   PLATELETS AUTO x10*3/uL 207   NEUTROS PCT AUTO % 77.9   LYMPHS PCT AUTO % 8.1   MONOS PCT AUTO % 9.5   EOS PCT AUTO % 3.8     Results from last 72 hours   Lab Units 05/07/24  0222   SODIUM mmol/L 136   POTASSIUM mmol/L 4.2   CHLORIDE mmol/L 100   CO2 mmol/L 24   BUN mg/dL 33*   CREATININE mg/dL 0.90   GLUCOSE mg/dL 96   CALCIUM mg/dL 9.7   ANION GAP mmol/L 12   EGFR mL/min/1.73m*2 72     Results from last 72 hours   Lab Units 05/07/24  0222   ALK PHOS U/L 90   BILIRUBIN TOTAL mg/dL 0.3   PROTEIN TOTAL g/dL 6.8   ALT U/L 9   AST U/L 14   ALBUMIN g/dL 3.0*     Estimated Creatinine Clearance: 56.4 mL/min (by C-G formula based on SCr of 0.9 mg/dL).  No results found for: \"CRP\", \"SEDRATE\"  HIV 1 and 2 Screen   Date Value Ref Range Status   05/27/2022 NONREACTIVE NONREACTIVE Final     Comment:      HIV Ag/Ab screen is performed using the Siemens [employer]   HIV Ag/Ab Combo assay which detects the presence of HIV    p24 antigen as well as antibodies to HIV-1   (Group M and O) and HIV-2.  .  No laboratory evidence of HIV infection. If acute HIV infection is   suspected, consider testing for HIV RNA by PCR (viral load).       No results found for: \"HEPCABINIT\", \"HEPCAB\", \"HCVPCRQUANT\"  Microbiology  Reviewed  Imaging  CT chest w IV contrast    Result " Date: 5/7/2024  Interpreted By:  Chaitanya Lovell, STUDY: CT CHEST W IV CONTRAST;  5/7/2024 4:25 am   INDICATION: Signs/Symptoms:pleural effusion.   COMPARISON: 05/13/2022   ACCESSION NUMBER(S): [id number]   ORDERING CLINICIAN: JENN NEWMAN   TECHNIQUE: Helical data acquisition of the chest was obtained without intravenous contrast. Images were reformatted in axial, coronal, and sagittal planes.   FINDINGS: LOWER NECK AND CHEST WALL:  Right chest port catheter.   MEDIASTINUM/REGINE:No lymphadenopathy. Metallic esophageal stent in place. Trace debris within the stent lumen. Confluent soft tissue density within the posterior paratracheal and paraesophageal mediastinum may reflect confluence of adenopathy or locally advanced malignancy.   CARDIOVASCULAR:  Cardiac chamber size within normal limits. Small pericardial effusion. Right chest port catheter tip terminating at the SVC/RA junction. Aortic caliber normal. Normal caliber of main pulmonary artery. Scattered coronary atherosclerotic calcification.   LUNGS, AIRWAYS, AND PLEURA:  Severe focal narrowing of the left mainstem bronchus. There is complete occlusion/filling of the majority of the left lower lobar and left lower lobe segmental bronchi. Moderate bilateral pleural effusions. Moderate emphysema. Patchy ground-glass opacities throughout the right lung and dependent right lower lobe consolidation may reflect an infectious/inflammatory process. Somewhat nodular interlobular septal thickening at the medial right lung base may reflect lymphangitic carcinomatosis. There are spiculated pulmonary nodules within the left upper lobe measuring 2.1 cm, right upper lobe measuring 1.3 cm, and posteroinferior right lower lobe measuring 1.5 cm as well as the superior segment of the right lower lobe measuring 1.1 cm. There is complete collapse of the left upper lobe and complete filling of the left upper lobar bronchi.   MUSCULOSKELETAL: No acute osseous  abnormality or suspicious osseous lesions. Mild multilevel spinal degenerative change.   UPPER ABDOMEN: Unremarkable.       1. Esophageal stent in place with soft tissue attenuation material in the posterior mediastinum surrounding the esophagus and central airways, likely reflecting known locally advanced esophageal squamous cell carcinoma with possible additional superimposed confluent adenopathy. There is complete narrowing/filling of the left upper lobe airway with collapse/consolidation of the left upper lobe. There is partial filling/narrowing of the left lower lobe airway with extensive material filling the central bronchi of the left lower lobe. Extensive patchy ground-glass/consolidative opacities within the right lung concerning for an infectious/inflammatory process. 2. Moderate bilateral pleural effusions. 3. Spiculated bilateral pulmonary nodules, likely metastatic. 4. Small pericardial effusion. 5. Somewhat nodular interlobular septal thickening at the medial right lung base may reflect lymphangitic carcinomatosis.   Signed by: Chaitanya Lovell 5/7/2024 5:02 AM Dictation workstation:   [id number]    XR chest 1 view    Result Date: 5/7/2024  Interpreted By:  Russell Escalante, STUDY: XR CHEST 1 VIEW;  5/7/2024 2:29 am   INDICATION: Signs/Symptoms:Chest Pain.   COMPARISON: 04/06/2017   ACCESSION NUMBER(S): [id number]   ORDERING CLINICIAN: JENN NEWMAN   FINDINGS: There is an esophageal stent. There is a right chest port terminating over the cavoatrial junction.   There is a large left pleural effusion. There is new bilateral hilar enlargement and nodularity likely representing lymphadenopathy. There is left hemithorax volume loss. There is airspace disease at the left lung base. There is diffuse peribronchovascular and interstitial prominence. No pneumothorax.       Multiple cardiopulmonary abnormalities including probable pulmonary edema, large left pleural effusion, bilateral hilar and  mediastinal lymphadenopathy and probable airway obstruction on the left resulting in volume loss of the left hemithorax. CT chest with contrast is recommended for further evaluation.   Esophageal stent consistent with esophageal cancer.   MACRO: None.   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 M: CTBWO_3 CT Dose-Length Product (DLP): 1208  mGy*cm CT Dose Reduction Employed: Automated exposure control (AEC) COMPARISON:  CT brain 04/13/2024.  MRI brain 04/12/2024. RESULT: Acute change:  No evidence of an acute intracranial process. Hemorrhage:  No evidence of acute intracranial hemorrhage. Mass Lesion / Mass Effect: Again noted is the low attenuating mass lesion in the left medial parietal lobe with confluent surrounding vasogenic edema, intracranial metastasis, better delineated on the prior MR brain 04/12/2024.  Otherwise, no midline shift or herniation. Chronic change:  Patchy nonspecific supratentorial white matter changes likely reflecting chronic microvascular ischemia. Parenchyma:  Mild generalized parenchymal volume loss. Ventricles:  Commensurate with volume loss. Other:  The calvarium, skull base, imaged paranasal sinuses, mastoids, orbits and extracranial soft tissues are unremarkable.  Stereotactic frame is in place.  (topogram) images:  No additional findings.    IMPRESSION: Localization exam.  No acute intracranial process. : VEENA   Transcribe Date/Time: Apr 16 2024 11:46A Dictated by : KAREY AVELAR, DO This examination was interpreted and the report  reviewed and electronically signed by: KAREY AVELAR DO on Apr 16 2024 11:49AM  EST    CT BRAIN STEREOLOCAL WO IVCON    Result Date: 4/13/2024  * * *Final Report* * * DATE OF EXAM: Apr 13 2024  2:31PM   [facility] [place]   0503  -  CT BRAIN STEREOLOCAL WO IVCON  / ACCESSION #  [id number] PROCEDURE REASON: Other (document in comments)      * * * * Physician Interpretation * * * *  EXAMINATION:  CT STEREOLOCALIZATION BRAIN WITH CONTRAST HISTORY:  63 year old female with left parietal mass, preoperative stereo localization exam. TECHNIQUE: CT head high-resolution stereotactic localization without contrast. M: CTBWO_3 CT Dose-Length Product (DLP): 1336  mGy*cm CT Dose Reduction Employed: Automated exposure control (AEC) COMPARISON:  MR brain with and without contrast 04/12/2024. RESULT: The patient's known left parietal mass lesion is better demonstrated on the 04/12/2024 MRI with and without contrast.  There is surrounding vasogenic edema.  Mild local mass effect with partial effacement of the left occipital horn and atrium of the left lateral ventricle. No acute infarct or hemorrhage.  No midline shift or abnormal extra-axial fluid collection.  Scattered white matter hypoattenuation, nonspecific, however likely representing sequela of prior chronic microvascular ischemia.  Mild to moderate generalized parenchymal volume loss with commensurate prominence of the cortical sulci and ventricles.  Partial left lateral ventricular effacement, as above.  No hydrocephalus or ventricular trapping. Paranasal sinuses are clear.  Mastoid air cells and middle ear cavities are clear bilaterally.  Bilateral orbits are within normal limits.   Overlying soft tissues demonstrate no focal abnormality.  No destructive calvarial lesion.  Patient is edentulous.    IMPRESSION: Stereotactic localization examination. Known left parietal lesion is better demonstrated on the 04/12/2024 MRI with and without contrast. No acute hemorrhage or large territorial  infarct. : [facility]   Transcribe Date/Time: Apr 13 2024  2:33P Dictated by : RAJ DELGADO MD This examination was interpreted and the report reviewed and electronically signed by: ROBERT HAMPTON MD on Apr 13 2024  2:50PM  EST    FL enema single contrast water soluble    Result Date: 4/12/2024  * * *Final Report* * * DATE OF EXAM: Apr 12 2024  2:28PM   [facility]   [id number]  -  XR COLON SINGLE CONTRAST  / ACCESSION #  [id number] PROCEDURE REASON: Assess for fistula      * * * * Physician Interpretation * * * *  WATER SOLUBLE CONTRAST ENEMA HISTORY: Assess for rectovaginal fistula. COMPARISON: CT abdomen and pelvis 02/27/2024 TECHNIQUE: Water soluble-contrast enema was performed after insertion of a rectal tube.  Spot and overhead images were obtained. Contrast: RECTAL:  400 ml of OMNIPAQUE 300 Fluoroscopy radiation summary: Fluoroscopy time: 1:30 (min:sec). Air kerma: 80.5 mGy. RESULT: : No dilated loops of bowel.  Bilateral pelvic tubal ligation clips.  Calcified uterine fibroids. Contrast refluxes to the level of the mid sigmoid colon.  There is no communication with the vagina.  Filling defects in the opacified colon consistent with feces. Examination ended due to patient discomfort and difficulty retaining the contrast material. The study was performed by CHANCE Sal, under the supervision of Dr. Bishop, who was present for the critical portion of the exam.   Images associated with this study were submitted for interpretation and reviewed by Dr. Bishop. ===========    IMPRESSION: NO DEMONSTRATED RECTOVAGINAL FISTULA. : [facility]   Transcribe Date/Time: Apr 12 2024  2:38P Dictated by : CHANCE SAL This examination was interpreted and the report reviewed and electronically signed by: MABEL BISHOP MD on Apr 12 2024  3:25PM  EST    MR brain w and wo IV contrast    Result Date: 4/12/2024  * * *Final Report* * * DATE OF EXAM: Apr 12 2024  5:39AM   [facility]   0295  -  MRI BRAIN WO/W  IVCON  / ACCESSION #  [id number] PROCEDURE REASON: Metastatic disease evaluation      * * * * Physician Interpretation * * * *  EXAMINATION:  MRI BRAIN WO/W IVCON CLINICAL HISTORY: Gamma knife protocol preop localization exam. TECHNIQUE:  Limited Gamma knife protocol coronal T1 pre and post contrast, axial postcontrast T1, axial T2 sequence. Contrast:  11 mL [facility] IV COMPARISON: MRI of yesterday RESULT: Mass Lesion/ Mass Effect:    Peripherally enhancing cystic and solid right parietal mass measuring approximately 3 x 3 x 3 cm with more heterogenous solid components along its lateral margin.  Moderate surrounding vasogenic edema and mild locoregional mass effect.  No midline shift. No other abnormal enhancing brain lesions. Chronic Change:  Unchanged burden of supra and infratentorial chronic small vessel change. Parenchyma:   No significant volume loss for age. Ventricles:     Normal caliber and morphology. Vasculature:    Major intracranial arterial structures, and dural venous sinuses show typical flow void, suggesting patency by spin echo criteria. Other:  The visualized paranasal sinuses and mastoid air cells are clear.  The orbits and extracranial soft tissues are unremarkable.    IMPRESSION: Unchanged left parietal cystic and solid mass most compatible with metastasis.  No other enhancing brain lesions. : [facility]   Transcribe Date/Time: Apr 12 2024  6:13A Dictated by : ADIN DARBY MD This examination was interpreted and the report reviewed and electronically signed by: ADIN DARBY MD on Apr 12 2024  6:30AM  EST    MR brain w and wo IV contrast    Result Date: 4/11/2024  * * *Final Report* * * DATE OF EXAM: Apr 10 2024 11:48PM   [facility]   0295  -  MRI BRAIN WO/W IVCON  / ACCESSION #  [id number] PROCEDURE REASON: Brain/CNS neoplasm, monitor      * * * * Physician Interpretation * * * *  EXAMINATION:  MRI BRAIN WO IVCON CLINICAL HISTORY: Brain CNS neoplasm. TECHNIQUE:  Routine brain MRI  protocol without and with contrast including diffusion images. MQ:  MRBWOW_2 Contrast: None COMPARISON: CT brain 04/09/2024 RESULT: Truncated exam secondary to patient tolerance.  No postcontrast sequences are available. Acute Change:   There is no evidence of restricted diffusion to suggest an acute infarct.  There is some restricted diffusion around the periphery of the left parietal mass. Hemorrhage:    No evidence of prior parenchymal hemorrhage on the gradient echo images. Mass Lesion/ Mass Effect: 3 cm left parietal mass and moderate surrounding vasogenic edema. Chronic Change:  Scattered punctate foci of increased T2 and FLAIR signal are noted in the supratentorial and infratentorial white matter which is a nonspecific finding, but likely represents mild to moderate chronic microvascular ischemia. Parenchyma:   No significant volume loss for age. Ventricles:     Normal caliber and morphology. Skull Base:    Hypothalamic and pituitary region are grossly normal.   Craniocervical junction is normal. No significant marrow replacement process. Vasculature:    Major intracranial arterial structures, and dural venous sinuses show typical flow void, suggesting patency by spin echo criteria. Other:  No abnormal signal in the sinuses or mastoids.  The orbits and extracranial soft tissues are unremarkable.    IMPRESSION: Left parietal mass and surrounding vasogenic edema are unchanged from the CT of yesterday allowing for differences in modality. Truncated exam secondary to patient tolerance with no postcontrast imaging limiting evaluation for additional metastatic disease. : VEENA   Transcribe Date/Time: Apr 11 2024 12:27A Dictated by : ADIN DARBY MD This examination was interpreted and the report reviewed and electronically signed by: ADIN DARBY MD on Apr 11 2024 12:40AM  EST    XR chest 1 view    Result Date: 4/10/2024  * * *Final Report* * * DATE OF EXAM: Apr 9 2024 11:00PM   [facility]   [id number]  -  XR  CHEST 1V FRONTAL PORT  / ACCESSION #  [id number] PROCEDURE REASON: Shortness of breath      * * * * Physician Interpretation * * * *  EXAMINATION:  CHEST RADIOGRAPH (PORTABLE SINGLE VIEW AP) Exam Date/Time:  4/9/2024 11:00 PM Clinical History: Shortness of breath MQ:  XCPMC_6 Comparison:  Earlier the same day. RESULT: Lines, tubes, and devices:  Stable right-sided Port-A-Cath and esophageal stent.  Lungs and pleura:  No significant change of small left pleural effusion and associated basilar atelectasis.  Stable left parahilar opacity also related to atelectasis..  No pneumothorax.  Known metastatic lung nodules at the seen on prior chest CT.  No pneumothorax. Cardiomediastinal silhouette:  Stable cardiomediastinal silhouette. Other:  .    IMPRESSION: See result. : VEENA   Transcribe Date/Time: Apr 10 2024  7:11A Dictated by : SERENE HELM MD This examination was interpreted and the report reviewed and electronically signed by: SERENE HELM MD on Apr 10 2024  7:16AM  EST    CT angio chest w and wo IV contrast    Result Date: 4/9/2024  * * *Final Report* * * DATE OF EXAM: Apr 9 2024 10:53AM   [facility]   0540  -  CT CHEST W IVCON PE  / ACCESSION #  [id number] PROCEDURE REASON: Pulmonary embolism (PE) suspected, high prob      * * * * Physician Interpretation * * * * RESULT: EXAMINATION:  CHEST CT WITH CONTRAST (PULMONARY EMBOLISM PROTOCOL) CLINICAL HISTORY: Pulmonary embolus suspected Technique:  Spiral CT acquisition of the chest from the thoracic inlet to the upper abdomen following IV contrast.  Axial 1 and 3 mm thick slices plus coronal and sagittal reformatted images. MQ:  CTCP_5 Contrast: mL Omnipaque 300 IV CT Radiation dose: Integrated Dose-length product (DLP) for this visit = mGy*cm CT Dose Reduction Employed: Automated exposure control(AEC) and iterative recon Comparison:  CT chest 2/27/2024 RESULT: Limitations:  Some respiratory motion. Evaluation for thromboembolic disease:      - Right  heart chambers:  No thromboembolic disease.      - Main pulmonary arteries:  No thromboembolic disease.      - Lobar pulmonary arteries:  No thromboembolic disease.      - Segmental pulmonary arteries:  Limited due to motion in some areas.      - Subsegmental pulmonary arteries:  Limited due to motion in some areas      - Additional pulmonary artery findings:  The main pulmonary artery is normal in caliber. Lines, tubes, and devices:  Esophageal stent is situated in the mid/lower esophagus. Lung parenchyma and airways: Increased narrowing of the left main bronchus with occlusion of the left upper lobe bronchus and marked narrowing of the left lower lobe bronchus which is increased compared to prior. New lobar atelectasis of the left upper lobe/lingula. Numerous bilateral pulmonary metastases, the largest of which measures up to 2.2 cm are unchanged. Unable to assess the metastases in the atelectatic left upper lobe/lingula. Paramediastinal lung changes, presumably due to prior radiation therapy, similar to prior. There is again pulmonary emphysema. Pleural space:  Small left pleural effusion. Lower neck, lymph nodes, and mediastinum:  There is again esophageal stent. There is again soft tissue thickening along the left side of the mediastinum (series 8 image 97), which extends to the hilar region and causes worsened narrowing of the left main bronchus/left lower lobe bronchus and occlusion of the left upper lobe bronchus. The amount of soft tissue appears more prominent compared to prior but is difficult to measure. Heart, pericardium, and thoracic vessels:  The heart is not enlarged.   There is a small pericardial effusion.  No thoracic aortic aneurysm.   Atheromatous calcification of the aorta. Bones and soft tissues:  Degenerative changes of the spine. Upper abdomen:  Water density lesion posterior right hepatic lobe. Localizer images: No additional findings.    IMPRESSION: Evaluation of some of the small  peripheral pulmonary arteries is limited by respiratory motion. No definite CT evidence of pulmonary embolus otherwise. Increased amount of mediastinal soft tissue which encases the left-sided bronchi with occlusion of the left upper lobe bronchus and increased narrowing of the left main and left lower lobe bronchi. New complete atelectasis of the left upper lobe. The visualized bilateral pulmonary metastases are grossly unchanged. Small left pleural effusion. Small pericardial effusion. Transcribed Using Voice Recognition Transcribe Date/Time: Apr 9 2024 11:41A Dictated by: JO CORDOVA MD This examination was interpreted and the report reviewed and electronically signed by: JO CORDOVA MD on Apr 9 2024 12:07PM  EST    CT head wo IV contrast    Result Date: 4/9/2024  * * *Final Report* * * DATE OF EXAM: Apr 9 2024 10:52AM   E[facility]   0504  -  CT BRAIN WO IVCON  / ACCESSION #  [id number] PROCEDURE REASON: Seizure, focal (Ped 0-18y)      * * * * Physician Interpretation * * * * RESULT: EXAMINATION: CT BRAIN WO IVCON CLINICAL HISTORY: Altered mental status, seizure TECHNIQUE:  Serial axial images without IV contrast were obtained from the vertex to the foramen magnum. MQ:  CTBWO_3 CT Radiation dose: Integrated Dose-Length Product (DLP) for this visit =   1187  mGy*cm CT Dose Reduction Employed: Automated exposure control(AEC) and iterative recon COMPARISON: Head CT 10/27/2023 RESULT: Post-operative change: None. Acute change: No evidence of an acute infarct or other acute parenchymal process. Hemorrhage: No evidence of acute intracranial hemorrhage. ECASS hemorrhagic transformation score: Not Applicable Mass Lesion / Mass Effect: There is a new 3 cm left parietal mass with localized sulcal effacement and surrounding vasogenic edema. Chronic change: Scattered patchy foci of low attenuation are present within supratentorial white matter which is a nonspecific finding but likely represents mild microvascular ischemia.  Parenchyma: There is mild generalized volume loss.  The brain parenchyma is otherwise within normal limits for age. Ventricles: Ventricular enlargement concordant with the degree of parenchymal volume loss. Paranasal sinuses and skull base: The visualized paranasal sinuses are grossly clear.   The skull base and imaged soft tissues are unremarkable. Localizer images: No additional findings.    IMPRESSION: Left parietal mass new from 10/27/2023, with surrounding vasogenic edema and localized mass effect. COMMUNICATION:  Communicated with DR ALFONZO ELIZABETH on 4/9/2024 11:12 AM  via verbal communication. Transcribed Using Voice Recognition Transcribe Date/Time: Apr 9 2024 11:05A Dictated by: NYDIA MADRIGAL MD This examination was interpreted and the report reviewed and electronically signed by: NYDIA MADRIGAL MD on Apr 9 2024 11:16AM  EST    XR chest 1 view    Result Date: 4/9/2024  * * *Final Report* * * DATE OF EXAM: Apr 9 2024  8:13AM   EUX   [id number]  -  XR CHEST 1V FRONTAL PORT  / ACCESSION #  [id number] PROCEDURE REASON: Shortness of breath      * * * * Physician Interpretation * * * * RESULT: EXAMINATION:  CHEST RADIOGRAPH (PORTABLE SINGLE VIEW AP) Exam Date/Time:  4/9/2024 8:13 AM CLINICAL HISTORY: Shortness of breath MQ:  XCPR_5 Comparison:  02/29/2024 RESULT: Lines, tubes, and devices:  Right IJ port, tip terminating at the cavoatrial junction.  The esophageal stent with no significant change in position, superior margin at the level of the clavicles. Lungs and pleura:  Mild left basilar opacities, likely subsegmental atelectasis.  No substantial pleural effusion or pneumothorax. Cardiomediastinal silhouette:  Stable cardiomediastinal silhouette with masslike soft tissue prominence from the level of the aortic arch to the izabela.    IMPRESSION: See result. Transcribed Using Voice Recognition Transcribe Date/Time: Apr 9 2024  8:16A Dictated by: NYDIA MADRIGAL MD This examination was interpreted and the report  reviewed and electronically signed by: NYDIA MADRIGAL MD on Apr 9 2024  8:20AM  EST     Assessment/Plan   Acute hypoxic respiratory failure-differentials include lung collapse, pleural effusion, infection  Abnormal CT chest-pleural effusion, left-sided lung collapse, possible pneumonia  Metastatic esophageal cancer    Continue Zosyn  IV vancomycin  Nasal MRSA PCR  Urine Legionella and streptococcal antigen  Interventional radiology consult for pleural tap  Oxygen as needed  Supportive care  Monitor temperature and WBC      Dylon Blackmon MD

## 2024-05-07 NOTE — CONSULTS
"Reason For Consult  Acute hypoxic respiratory failure    History Of Present Illness    History was obtained from review of the medical record and conversation with Dr. Deal the attending physician.  63-year-old female with known metastatic carcinoma of the esophagus.  She has known CNS metastases and has had gamma knife.  Has been residing at skilled nursing facility which should have worsening shortness of breath.  Patient is requiring high flow nasal cannula.  She was noted to have pleural effusions and pulmonary consultation was requested.     Past Medical History  She has a past medical history of Age-related nuclear cataract, left eye (12/11/2015), Age-related nuclear cataract, left eye (12/11/2015), Age-related nuclear cataract, right eye (12/11/2015), Age-related nuclear cataract, right eye (12/11/2015), Age-related nuclear cataract, right eye (12/11/2015), Low-tension glaucoma, bilateral, indeterminate stage (11/15/2016), Low-tension glaucoma, unspecified eye, stage unspecified (12/11/2015), Low-tension glaucoma, unspecified eye, stage unspecified (12/11/2015), Other specified health status, and Personal history of other diseases of the musculoskeletal system and connective tissue (12/01/2014).    Surgical History  She has a past surgical history that includes Tubal ligation (01/06/2017).    Review of Systems     Noncontributory     Social History  She has no history on file for tobacco use, alcohol use, and drug use.    Family History  Family History   Problem Relation Name Age of Onset    Glaucoma Mother      Cancer Mother's Brother          Allergies  Glucose and Milk containing products (dairy)    Last Recorded Vitals  Blood pressure 112/77, pulse (!) 109, temperature 36.4 °C (97.6 °F), temperature source Oral, resp. rate (!) 24, height 1.676 m (5' 6\"), weight 56.6 kg (124 lb 12.5 oz), SpO2 97%.     Physical Exam        5/7/2024     3:30 AM 5/7/2024     4:00 AM 5/7/2024     5:00 AM 5/7/2024     5:30 " AM 5/7/2024     6:00 AM 5/7/2024     7:00 AM 5/7/2024     7:30 AM   Vitals   Systolic 134 118 106 103 100 99 112   Diastolic 87 83 75 76 75 79 77   Heart Rate 111 111 111 108 106 105 109   Resp 27 21 29 23 19 22 24       1. vital signs reviewed  2. general appearance -frail debilitated cachectic female lying in bed wearing high flow nasal cannula  3. Skin -warm and dry, no rash or diaphoresis  4. HEENT-conjunctiva are pink.  Sclerae are anicteric.  Nares are unremarkable.  Pharynx is unremarkable  5. Neck-supple.  No JVP.  No thyromegaly.  6. Adenopathy-no peripheral adenopathy appreciable  7. Chest examination-lungs are quite diminished bronchial breath sounds at the bases posteriorly.    8. Heart sounds are normal S1 and S2.  No murmurs rubs or gallops.  9. Abdomen-mildly tender   10. Extremities-no calf tenderness, cyanosis clubbing, or edema  11. musculoskeletal-no joint effusion or swelling.  12. Neurologic-awake and alert, moves all extremities symmetrically, follows commands appropriately       Labs    Serum chemistries were reviewed and are unremarkable.  CBC shows mild anemia and otherwise is unremarkable    Xrays:  (personally reviewed)    CT scan of the chest was personally reviewed.  There is complete collapse of the left upper lobe.  There is narrowing of the left upper lobe bronchus.  Bilateral small to moderate pleural effusions.  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  Esophageal stent is evident as well.    Ventilator/ABG    FiO2 (%):  [50 %-90 %] 50 %  Results from last 7 days   Lab Units 05/07/24  0215   POCT PH, ARTERIAL pH 7.45*   POCT PCO2, ARTERIAL mm Hg 36*   POCT PO2, ARTERIAL mm Hg 93   POCT HCO3 CALCULATED, ARTERIAL mmol/L 25.0   POCT BASE EXCESS, ARTERIAL mmol/L 1.1       Impression    .  Acute on chronic hypoxic respiratory failure  .  Metastatic esophageal carcinoma  .  Pleural effusions, likely  malignant  .  Left upper lobe collapse  .  Cachexia    Plan    .  Continue high flow nasal cannula oxygen  .  Cannot rule out a postobstructive pneumonia.  However patient does lack fever or leukocytosis  I suspect we are dealing with advanced age metastatic esophageal carcinoma.  .  I doubt the pleural effusions are playing a significant role in her respiratory failure but it would be reasonable to tap the left effusion which I think is larger and see if she obtains any relief from that.  Again these are likely related to her metastatic disease  .  Continue empiric antibiotics pending further cultures  .  Could consider bronchoscopy to evaluate left upper lobe but again likely related to tumor  .  Bronchodilators with mucolytic's  .  Suspect this patient is probably better served having palliative care discuss hospice  .  Will follow with you.  Thank you      Bam Monteiro MD, [facility]

## 2024-05-07 NOTE — CARE PLAN
Problem: Respiratory  Goal: Clear secretions with interventions this shift  Outcome: Progressing  Goal: Minimize anxiety/maximize coping throughout shift  Outcome: Progressing  Goal: Minimal/no exertional discomfort or dyspnea this shift  Outcome: Progressing  Goal: No signs of respiratory distress (eg. Use of accessory muscles. Peds grunting)  Outcome: Progressing  Goal: Patent airway maintained this shift  Outcome: Progressing  Goal: Tolerate mechanical ventilation evidenced by VS/agitation level this shift  Outcome: Progressing  Goal: Tolerate pulmonary toileting this shift  Outcome: Progressing  Goal: Verbalize decreased shortness of breath this shift  Outcome: Progressing

## 2024-05-07 NOTE — SIGNIFICANT EVENT
Call to pt room for Low SpO2. When I arrived pt SpO2 was registering at 76%. Probe was moved to a different finger and the pt SpO2 was 91%.  I increased the FiO2 to 80% and the flow to 40%. The patient was very anxious and her HR was 118.  She verbalized being afraid. Once the pt calmed, her HR improved and SpO2 increased to 97%. I stayed at the bedside with the patient until she was comfortable with me leaving. Dr Deal was contacted for PRN Meds.

## 2024-05-07 NOTE — CONSULTS
Inpatient consult to Palliative Care  Consult performed by: Sruthi Rodgers, APRN-CNP  Consult ordered by: Russell Deal MD      Patient Location   [facility] SDU 9A  Reason For Consult  Reason for Consult: communication / medical decision making     History Of Present Illness  Debbi Segura is a 63 y.o. female with past medical history of  esophageal cancer  who presented from [facility] with increasing shortness of breath. She has h/o esophageal cancer completed 4 cycles Carb/Taxol in 2023. Esophageal stent was placed 11/23. She also underwent debulking of airway in 2023.  on recent admission to [facility] for visual field deficit noted to have parietal lobe lesion/met has had one XRT thus far. Was recommended a series of two-one month apart. After that hospitalization she was discharged to [facility] for SNF. Prior to this she had been living at home alone, independently.     Upon initial presentation to [facility] ED, noted to be hypoxic and has a large left pleural effusion. She received antibiotics, placed on HFNC and admitted.     Palliative medicine team consulted for GOC.      Past Medical History  She has a past medical history of Age-related nuclear cataract, left eye (12/11/2015), Age-related nuclear cataract, left eye (12/11/2015), Age-related nuclear cataract, right eye (12/11/2015), Age-related nuclear cataract, right eye (12/11/2015), Age-related nuclear cataract, right eye (12/11/2015), Low-tension glaucoma, bilateral, indeterminate stage (11/15/2016), Low-tension glaucoma, unspecified eye, stage unspecified (12/11/2015), Low-tension glaucoma, unspecified eye, stage unspecified (12/11/2015), Other specified health status, and Personal history of other diseases of the musculoskeletal system and connective tissue (12/01/2014).    Surgical History  She has a past surgical history that includes Tubal ligation (01/06/2017).     Social History  She has no history on file for tobacco use,  alcohol use, and drug use.    Caregiving/Caregiver Support  Does the patient require assistance in some or all components of his care, including coordination of medical care? Yes  If Yes, which person serves that role?  caregiver   Caregiver emotional or practical needs:  unknown     Family History  Family History   Problem Relation Name Age of Onset    Glaucoma Mother      Cancer Mother's Brother         Allergies  Glucose and Milk containing products (dairy)    Scheduled medications  acetylcysteine, 3 mL, nebulization, 4x daily  amLODIPine, 10 mg, oral, Daily  aspirin, 81 mg, oral, Once  atorvastatin, 20 mg, oral, Nightly  brimonidine, 1 drop, Right Eye, BID  enoxaparin, 40 mg, subcutaneous, Daily  furosemide, 40 mg, intravenous, Daily  ipratropium-albuteroL, 3 mL, nebulization, q4h while awake  levETIRAcetam, 750 mg, g-tube, BID  lisinopril, 20 mg, oral, Daily  methocarbamol, 500 mg, g-tube, TID  oxygen, , inhalation, Continuous - Inhalation  pantoprazole, 40 mg, oral, Daily  piperacillin-tazobactam, 3.375 g, intravenous, q6h  polyethylene glycol, 17 g, oral, Daily  sennosides, 2 tablet, oral, BID  thiamine, 100 mg, oral, Daily      Continuous medications     PRN medications  PRN medications: acetaminophen **OR** acetaminophen **OR** acetaminophen, hydrOXYzine HCL, ondansetron     Relevant Results  Flu/covid negative; ABG 7.45/36/93/25; prelim UA neg; wbc 7.9; hgb 10.0; bun 33; creat 0.9 gap 12  Results for orders placed or performed during the hospital encounter of 05/07/24 (from the past 24 hour(s))   Sars-CoV-2 PCR   Result Value Ref Range    Coronavirus 2019, PCR Not Detected Not Detected   Influenza A, and B PCR   Result Value Ref Range    Flu A Result Not Detected Not Detected    Flu B Result Not Detected Not Detected   Blood Gas Arterial Full Panel   Result Value Ref Range    POCT pH, Arterial 7.45 (H) 7.38 - 7.42 pH    POCT pCO2, Arterial 36 (L) 38 - 42 mm Hg    POCT pO2, Arterial 93 85 - 95 mm Hg    POCT  SO2, Arterial 100 94 - 100 %    POCT Oxy Hemoglobin, Arterial 97.1 94.0 - 98.0 %    POCT Hematocrit Calculated, Arterial 28.0 (L) 36.0 - 46.0 %    POCT Sodium, Arterial 134 (L) 136 - 145 mmol/L    POCT Potassium, Arterial 4.0 3.5 - 5.3 mmol/L    POCT Chloride, Arterial 104 98 - 107 mmol/L    POCT Ionized Calcium, Arterial 1.24 1.10 - 1.33 mmol/L    POCT Glucose, Arterial 102 (H) 74 - 99 mg/dL    POCT Lactate, Arterial 0.6 0.4 - 2.0 mmol/L    POCT Base Excess, Arterial 1.1 -2.0 - 3.0 mmol/L    POCT HCO3 Calculated, Arterial 25.0 22.0 - 26.0 mmol/L    POCT Hemoglobin, Arterial 9.4 (L) 12.0 - 16.0 g/dL    POCT Anion Gap, Arterial 9 (L) 10 - 25 mmo/L    Patient Temperature 37.0 degrees Celsius    FiO2 90 %    Site of Arterial Puncture Radial Right     Aamir's Test Positive    Urinalysis with Reflex Culture and Microscopic   Result Value Ref Range    Color, Urine Light-Yellow Light-Yellow, Yellow, Dark-Yellow    Appearance, Urine Turbid (N) Clear    Specific Gravity, Urine 1.013 1.005 - 1.035    pH, Urine 7.0 5.0, 5.5, 6.0, 6.5, 7.0, 7.5, 8.0    Protein, Urine NEGATIVE NEGATIVE, 10 (TRACE), 20 (TRACE) mg/dL    Glucose, Urine Normal Normal mg/dL    Blood, Urine NEGATIVE NEGATIVE    Ketones, Urine NEGATIVE NEGATIVE mg/dL    Bilirubin, Urine NEGATIVE NEGATIVE    Urobilinogen, Urine Normal Normal mg/dL    Nitrite, Urine NEGATIVE NEGATIVE    Leukocyte Esterase, Urine 500 Nidia/µL (A) NEGATIVE   Microscopic Only, Urine   Result Value Ref Range    WBC, Urine >50 (A) 1-5, NONE /HPF    WBC Clumps, Urine FEW Reference range not established. /HPF    RBC, Urine 3-5 NONE, 1-2, 3-5 /HPF    Squamous Epithelial Cells, Urine 1-9 (SPARSE) Reference range not established. /HPF    Bacteria, Urine 4+ (A) NONE SEEN /HPF    Mucus, Urine FEW Reference range not established. /LPF   CBC and Auto Differential   Result Value Ref Range    WBC 7.9 4.4 - 11.3 x10*3/uL    nRBC 0.0 0.0 - 0.0 /100 WBCs    RBC 3.96 (L) 4.00 - 5.20 x10*6/uL    Hemoglobin  10.0 (L) 12.0 - 16.0 g/dL    Hematocrit 32.5 (L) 36.0 - 46.0 %    MCV 82 80 - 100 fL    MCH 25.3 (L) 26.0 - 34.0 pg    MCHC 30.8 (L) 32.0 - 36.0 g/dL    RDW 18.8 (H) 11.5 - 14.5 %    Platelets 207 150 - 450 x10*3/uL    Neutrophils % 77.9 40.0 - 80.0 %    Immature Granulocytes %, Automated 0.4 0.0 - 0.9 %    Lymphocytes % 8.1 13.0 - 44.0 %    Monocytes % 9.5 2.0 - 10.0 %    Eosinophils % 3.8 0.0 - 6.0 %    Basophils % 0.3 0.0 - 2.0 %    Neutrophils Absolute 6.14 1.20 - 7.70 x10*3/uL    Immature Granulocytes Absolute, Automated 0.03 0.00 - 0.70 x10*3/uL    Lymphocytes Absolute 0.64 (L) 1.20 - 4.80 x10*3/uL    Monocytes Absolute 0.75 0.10 - 1.00 x10*3/uL    Eosinophils Absolute 0.30 0.00 - 0.70 x10*3/uL    Basophils Absolute 0.02 0.00 - 0.10 x10*3/uL   Comprehensive Metabolic Panel   Result Value Ref Range    Glucose 96 65 - 99 mg/dL    Sodium 136 133 - 145 mmol/L    Potassium 4.2 3.4 - 5.1 mmol/L    Chloride 100 97 - 107 mmol/L    Bicarbonate 24 24 - 31 mmol/L    Urea Nitrogen 33 (H) 8 - 25 mg/dL    Creatinine 0.90 0.40 - 1.60 mg/dL    eGFR 72 >60 mL/min/1.73m*2    Calcium 9.7 8.5 - 10.4 mg/dL    Albumin 3.0 (L) 3.5 - 5.0 g/dL    Alkaline Phosphatase 90 35 - 125 U/L    Total Protein 6.8 5.9 - 7.9 g/dL    AST 14 5 - 40 U/L    Bilirubin, Total 0.3 0.1 - 1.2 mg/dL    ALT 9 5 - 40 U/L    Anion Gap 12 <=19 mmol/L   Magnesium   Result Value Ref Range    Magnesium 1.70 1.60 - 3.10 mg/dL   NT Pro-BNP   Result Value Ref Range    PROBNP 1,649 (H) 0 - 226 pg/mL   Serial Troponin, Initial ([facility])   Result Value Ref Range    Troponin T, High Sensitivity 16 (HH) <=14 ng/L   Blood Culture    Specimen: Peripheral Venipuncture; Blood culture   Result Value Ref Range    Blood Culture Loaded on Instrument - Culture in progress    Blood Culture    Specimen: Peripheral Venipuncture; Blood culture   Result Value Ref Range    Blood Culture Loaded on Instrument - Culture in progress    D-dimer, quantitative   Result Value Ref Range    D-Dimer  Non VTE, Quant (mg/L FEU) 3.09 (H) 0.19 - 0.50 mg/L FEU   Serial Troponin, 2 Hour ([facility])   Result Value Ref Range    Troponin T, High Sensitivity 14 <=14 ng/L   Serial Troponin, 6 Hour ([facility])   Result Value Ref Range    Troponin T, High Sensitivity 16 (HH) <=14 ng/L   Lavender Top   Result Value Ref Range    Extra Tube Hold for add-ons.       @[facility]     Review of Systems   Constitutional:  Negative for chills and fever.   HENT:  Positive for trouble swallowing.    Eyes:  Positive for visual disturbance.   Respiratory:  Positive for chest tightness and shortness of breath.    Cardiovascular:  Negative for palpitations and leg swelling.   Gastrointestinal:  Negative for abdominal pain, nausea and vomiting.   Musculoskeletal:  Positive for arthralgias and back pain.   Neurological:  Positive for headaches. Negative for dizziness.        Numbness and tingling lower extremities   Psychiatric/Behavioral:  Negative for confusion. The patient is nervous/anxious.        Functional Status  Activities of Daily Living:  Basic ADLs: (I= independent, A= assistance, D= dependent)  Bathing: A, Dressing: A, Toileting: A, Transferring: A, Continence: A, Feeding: I    Goddard Index:    Instrumental ADLs: (I= independent, A= assistance, D= dependent)  Ability to use phone: I, Shopping: D, Cooking: D, Housekeeping: D, Laundry: D, Transportation: D, Medications: D, Handle Finances: D   Maywood Scale:       Serious Illness Conversation - PT UNWILLING TO ENGAGE IN SERIOUS ILLNESS CONVERSATION TODAY  What is your understanding now of where you are with your illness:  NA  How much information about what is likely to be ahead with your illness  would you like from me: NA  What are your most important goals if your health situation worsens:  NA  What are your biggest fears and worries about the future with your health:  NA  What gives you strength as you think about the future with your illness:  NA  What abilities are so critical to your life  "that you can’t imagine living without them:  NA  If you become sicker, how much are you willing to go through for the possibility of gaining more time:  NA  How much does your family know about your priorities and wishes:  NA     Physical Exam  Vitals and nursing note reviewed.   Constitutional:       General: She is not in acute distress.     Appearance: She is ill-appearing.      Comments: Very thin and frail; appears much older than stated age   HENT:      Head: Normocephalic and atraumatic.      Mouth/Throat:      Mouth: Mucous membranes are dry.      Pharynx: Oropharynx is clear.   Eyes:      General: No scleral icterus.     Extraocular Movements: Extraocular movements intact.      Pupils: Pupils are equal, round, and reactive to light.   Neck:      Comments: Cervical rotation decreased  Cardiovascular:      Rate and Rhythm: Regular rhythm. Tachycardia present.      Pulses: Normal pulses.   Pulmonary:      Breath sounds: No stridor. No rales.      Comments: Respirations are shallow, even, slightly labored; on 40 liters, 70% HFNC  Abdominal:      General: Abdomen is flat.      Palpations: Abdomen is soft.      Comments: peg   Musculoskeletal:         General: No deformity.      Right lower leg: No edema.      Left lower leg: No edema.   Skin:     General: Skin is warm and dry.   Neurological:      General: No focal deficit present.      Mental Status: She is alert and oriented to person, place, and time.   Psychiatric:         Thought Content: Thought content normal.         Judgment: Judgment normal.      Comments: anxious         Last Recorded Vitals  Blood pressure 102/77, pulse (!) 109, temperature 36.5 °C (97.7 °F), temperature source Axillary, resp. rate 22, height 1.676 m (5' 6\"), weight 56.6 kg (124 lb 12.5 oz), SpO2 97%.      PALLIATIVE MEDICINE PALLIATIVE PERFORMANCE SCALE     Palliative Performance Scale % (PPS) 40-50%   Oral Intake Severely reduced (< or = mouthfuls) (2.5)   Edema Absent (0) "   Dyspnea at Rest Present (3.5)   Delirium Absent (0)   Palliative Prognostic Index (PPI) Total Score 4.5-6   Note: The scores from each prognostic domain are added.  A score of 0 to 2.0 was associated with a median survival of 90 days; score of 2.1 to 4.0 is 61 days, and score of >4.0 is 12 days.        Assessment/Plan   Sepsis, due to unspecified organism, unspecified whether acute organ dysfunction present (Multi)   IMP:    Acute hypoxic respiratory failure - 2/2 large left pleural effusion. Bilat lung nodules noted on chest CT likely metastatic disease. Pulm consulted recommending thoracentesis.   Metastatic esophageal cancer - mets to brain and now lungs. Prognosis poor  Anxiety disorder - chronic, on Vistaril outpatient, continue  Brain metastatic disease on Keppra  Chronic pain - generalized resume home regimen with fentanyl Duragesic patch, nhi, prn tylenol and prn methocarbamol     Palliative Care Encounter  FULL CODE  Capable  Son Rio is only child, stated POA but we do not have this documentation. Regardless he is legal surrogate if needed.  Patient and son met with Hospice a few weeks ago when brain mets were identified. She was not ready to shift goals of care and wanted to proceed with radiation therapy at that time. Had first treatment 4/15. Next scheduled mid-May. Given new findings lung involvement we were consulted to assist with GOC. The patient today was very anxious. She did not want to discuss code status or GOC. She is aware of her diagnosis and prognosis and capable of medical decision making. She did tell me to speak with her son. I spoke to Rio over phone. Prior to hospitalizaton in April at [facility], she had been living at home independently. After that hospitalization she was dc to [facility] for rehab. She has gotten progressively weaker and no longer ambulating, needs a lot more assistance with basic ADL's. D/w son that given the chronic and progressive nature of her disease,  unlikely that she will be able to return to her baseline level of function she was at earlier on this year. I reviewed problems, labs, imaging with him over the phone. He was appreciative of updates. We discussed code status, hospice, palliative recs would be change code status [id number] continue with XRT if she wishes to and then proceed with hospice care. He will talk to her. We will also revisit discussion with the patient tomorrow in hopes that she is willing to engage in the conversation, this is markedly limited today due to her anxiety which her son states is a lifelong problem but more so now with everything that is going on.      Patient/proxy preference for information  Prefers full information    Provider estimate of survival: 1-6 months  Patient Prognostic Awareness: No - patient does not want to discuss    Is the patient hospice-eligible?   Yes  Was a discussion held re hospice services?   yes  Was a decision made re hospice services?  No    Symptom Management  Pain: generalized, back, head, facial  Medications recommended for pain?  Yes  Tiredness: yes  Nausea: denies  Depression: no  Anxiety: yes  Drowsiness: yes  Appetite: peg  Wellbeing: poor  Dyspnea: yes  Intervention recommended for dyspnea?  yes  Other: na  Intervention recommended for constipation?  Yes    D/w Dr. Deal.    Thank you for this consultation. We will follow.    I spent 120 minutes in the professional and overall care of this patient.      Sruthi Rodgers, APRN-CNP

## 2024-05-07 NOTE — ED TRIAGE NOTES
To ED via [facility] from [facility], pt sent in for SOB, found by RN at facility to be 62% on 3L O2 via NC, pt repositioned in bed and given breathing treatment, nurse states patient briefly improved to 80% SPO2, states increased O2 to 4L and pt declined into the 70's. EMS on arrival placed the pt on a NRB and SPO2 improved to 94%. RN at facility states the pt had crackles in her lungs earlier in the day, an outpatient xray was performed but no results.     EMS on arrival also stated the pt had an oral temperature of 101F.     Pt on arrival to ED, labored in the high 30's, accessory muscle usage, talking int 2-3 word sentences.     Pt does have a recent diagnosis of mets to the brain. Is being seen at [facility] for this.

## 2024-05-07 NOTE — CONSULTS
"Nutrition Assessement Note    Nutrition Assessment    Reason for Assessment: Tube feeding recommendations  Admitted from SNF w/SOB. Hx of metastatic esophageal CA, PEG dependent. Spoke with [facility]. Patient on [employer] plant based formula (milk allergy). They run [employer] 1.5 @80 mL/hr until 1280 ml infused. State patient was on bolus feeds at home. Will provide tube feed recommendations at this time. One case of [employer] 1.0 delivered to RN at time of visit.    Reason for Hospital Admission:  Debbi Segura is a 63 y.o. female who is admitted for SOB.    Past Medical History:   Diagnosis Date    Age-related nuclear cataract, left eye 12/11/2015    Cataract, nuclear sclerotic, left eye    Age-related nuclear cataract, left eye 12/11/2015    Age-related nuclear cataract of left eye    Age-related nuclear cataract, right eye 12/11/2015    Cataract, nuclear sclerotic, right eye    Age-related nuclear cataract, right eye 12/11/2015    Age-related nuclear cataract of right eye    Age-related nuclear cataract, right eye 12/11/2015    Age-related nuclear cataract of right eye    Low-tension glaucoma, bilateral, indeterminate stage 11/15/2016    Bilateral low-tension glaucoma, indeterminate stage    Low-tension glaucoma, unspecified eye, stage unspecified 12/11/2015    Low tension glaucoma    Low-tension glaucoma, unspecified eye, stage unspecified 12/11/2015    Glaucoma, low tension    Other specified health status     No known health problems    Personal history of other diseases of the musculoskeletal system and connective tissue 12/01/2014    History of low back pain      Past Surgical History:   Procedure Laterality Date    TUBAL LIGATION  01/06/2017    Tubal Ligation       Nutrition History:  Food and Nutrient History: PEG tube feeds     Food Allergies/Intolerances:   milk products  GI Symptoms: None  Oral Problems: Swallowing difficulty    Anthropometrics:  Ht: 167.6 cm (5' 6\"), Wt: 56.6 kg (124 lb " 12.5 oz), BMI: 20.15  IBW/kg (Dietitian Calculated): 59.09 kg  Percent of IBW: 95 %       Weight Change:  Daily Weight  05/07/24 : 56.6 kg (124 lb 12.5 oz)  12/21/22 : 59.4 kg (131 lb)  10/19/22 : 63.1 kg (139 lb 3 oz)  08/02/22 : 58.5 kg (129 lb)  05/27/22 : 60.8 kg (134 lb)  04/28/22 : 61.2 kg (135 lb)  04/08/22 : 61.2 kg (135 lb)  08/27/20 : 62.1 kg (137 lb)  06/16/20 : 59.9 kg (132 lb)     Nutrition Focused Physical Exam Findings: defer: Not appropriate  Subcutaneous Fat Loss  Orbital Fat Pads: Defer  Buccal Fat Pads: Defer  Triceps: Defer  Ribs: Defer    Muscle Wasting  Temporalis: Defer  Pectoralis (Clavicular Region): Defer  Deltoid/Trapezius: Defer  Interosseous: Defer  Trapezius/Infraspinatus/Supraspinatus (Scapular Region): Defer  Quadriceps: Defer  Gastrocnemius: Defer    Nutrition Significant Labs:  Lab Results   Component Value Date    WBC 7.9 05/07/2024    HGB 10.0 (L) 05/07/2024    HCT 32.5 (L) 05/07/2024     05/07/2024    CHOL 164 04/28/2022    TRIG 158 (H) 06/16/2020    HDL 53.6 04/28/2022    ALT 9 05/07/2024    AST 14 05/07/2024     05/07/2024    K 4.2 05/07/2024     05/07/2024    CREATININE 0.90 05/07/2024    BUN 33 (H) 05/07/2024    CO2 24 05/07/2024    TSH 1.46 08/02/2022    INR 1.0 12/29/2021    HGBA1C 4.9 04/28/2022     Nutrition Specific Medications:  acetylcysteine, 3 mL, nebulization, 4x daily  amLODIPine, 10 mg, oral, Daily  aspirin, 81 mg, oral, Once  atorvastatin, 20 mg, oral, Nightly  brimonidine, 1 drop, Right Eye, BID  enoxaparin, 40 mg, subcutaneous, Daily  fentaNYL, 1 patch, transdermal, q72h  fentaNYL, 1 patch, transdermal, q72h  furosemide, 40 mg, intravenous, Daily  gabapentin, 250 mg, oral, BID  gabapentin, 500 mg, oral, Nightly  ipratropium-albuteroL, 3 mL, nebulization, q4h while awake  levETIRAcetam, 750 mg, g-tube, BID  lisinopril, 20 mg, oral, Daily  oxygen, , inhalation, Continuous - Inhalation  pantoprazole, 40 mg, oral, Daily  piperacillin-tazobactam,  3.375 g, intravenous, q6h  polyethylene glycol, 17 g, oral, Daily  sennosides, 2 tablet, oral, BID  thiamine, 100 mg, oral, Daily  [START ON 5/8/2024] vancomycin (Xellia) 1 g in 200 mL, 1 g, intravenous, q24h         Dietary Orders (From admission, onward)       Start     Ordered    05/07/24 0927  Adult diet Regular  Diet effective now        Question:  Diet type  Answer:  Regular    05/07/24 0927                   Estimated Needs:   Estimated Energy Needs  Total Energy Estimated Needs (kCal):  (1710-1995)  Total Estimated Energy Need per Day (kCal/kg):  (30-35)  Method for Estimating Needs: Actual Wt    Estimated Protein Needs  Total Protein Estimated Needs (g):  ()  Total Protein Estimated Needs (g/kg):  (1.2-2.0)  Method for Estimating Needs: Actual Wt    Estimated Fluid Needs  Total Fluid Estimated Needs (mL):  (1710-1995)  Method for Estimating Needs: 1 mL/kcal      Nutrition Diagnosis   Nutrition Diagnosis:     Nutrition Diagnosis  Patient has Nutrition Diagnosis: Yes  Diagnosis Status (1): New  Nutrition Diagnosis 1: Inadequate enteral nutrition infusion  Related to (1): decreased ability to consume sufficient energy  As Evidenced by (1): NPO       Nutrition Interventions/Recommendations   Nutrition Interventions and Recommendations:    Nutrition Prescription:  Individualized Nutrition Prescription Provided for : 1710-1995 calories,  gm protein to be provided via enteral nutrition    Nutrition Interventions:   Food and/or Nutrient Delivery Interventions  Interventions: Enteral intake  Enteral Intake: Modify composition of enteral nutrition, Modify rate of enteral nutrition  Goal: If tube feed is initiated, recommend: Mobbles 1.0 (11 zo carton) - 1 carton (325 mL) Q4H (6 times daily) to provide 1950 calories (325 kcals each), 96 gm protein (16 gm each), 1540 ml free water. Suggest 30 mL water flush before and after each bolus feed.    Education Documentation  No documentation found.          Nutrition Monitoring and Evaluation   Monitoring/Evaluation:   Food/Nutrient Related History Monitoring  Monitoring and Evaluation Plan: Enteral and parenteral nutrition intake  Enteral and Parenteral Nutrition Intake: Enteral nutrition formula/solution  Criteria: Monitoring for tube feed initiation       Time Spent/Follow-up:   Follow Up  Time Spent (min): 30 minutes  Last Date of Nutrition Visit: 05/07/24  Nutrition Follow-Up Needed?: 3-5 days  Follow up Comment: 5/9/24

## 2024-05-07 NOTE — ED PROVIDER NOTES
HPI   Chief Complaint   Patient presents with    Respiratory Distress       63-year-old female with a complicated past medical history including COPD, esophageal cancer, CKD, chronic respiratory failure is brought to the emergency department by EMS with a chief complaint of shortness of breath.  Patient is normally on 2 L by nasal cannula for COPD and CHF.  Her caregivers at the nursing home found her to be hypoxic in the 60s and called 911.  The paramedics gave her a breathing treatment with improvement, however in the emergency department she is still hypoxic in the 80s on her normal liters.  As she was requiring a nonrebreather to maintain saturations above 92%, she was switched to high flow.  The patient is a poor historian due to the respiratory distress.  Nursing home also notes that she had a fever as well.      History provided by:  Patient, EMS personnel and nursing home  History limited by:  Acuity of condition and severe respiratory distress   used: No                        Swisher Coma Scale Score: 15                     Patient History   Past Medical History:   Diagnosis Date    Age-related nuclear cataract, left eye 12/11/2015    Cataract, nuclear sclerotic, left eye    Age-related nuclear cataract, left eye 12/11/2015    Age-related nuclear cataract of left eye    Age-related nuclear cataract, right eye 12/11/2015    Cataract, nuclear sclerotic, right eye    Age-related nuclear cataract, right eye 12/11/2015    Age-related nuclear cataract of right eye    Age-related nuclear cataract, right eye 12/11/2015    Age-related nuclear cataract of right eye    Low-tension glaucoma, bilateral, indeterminate stage 11/15/2016    Bilateral low-tension glaucoma, indeterminate stage    Low-tension glaucoma, unspecified eye, stage unspecified 12/11/2015    Low tension glaucoma    Low-tension glaucoma, unspecified eye, stage unspecified 12/11/2015    Glaucoma, low tension    Other specified  health status     No known health problems    Personal history of other diseases of the musculoskeletal system and connective tissue 12/01/2014    History of low back pain     Past Surgical History:   Procedure Laterality Date    TUBAL LIGATION  01/06/2017    Tubal Ligation     Family History   Problem Relation Name Age of Onset    Glaucoma Mother      Cancer Mother's Brother       Social History     Tobacco Use    Smoking status: Not on file    Smokeless tobacco: Not on file   Substance Use Topics    Alcohol use: Not on file    Drug use: Not on file       Physical Exam   ED Triage Vitals   Temperature Heart Rate Respirations BP   05/07/24 0159 05/07/24 0159 05/07/24 0159 05/07/24 0159   36.4 °C (97.6 °F) (!) 117 (!) 37 (!) 124/98      Pulse Ox Temp Source Heart Rate Source Patient Position   05/07/24 0159 05/07/24 0159 05/07/24 0330 05/07/24 0330   100 % Oral Monitor Lying      BP Location FiO2 (%)     05/07/24 0330 05/07/24 0159     Left arm 81 %       Physical Exam  Vitals and nursing note reviewed.   Constitutional:       General: She is in acute distress.      Appearance: She is well-developed and normal weight. She is ill-appearing. She is not toxic-appearing or diaphoretic.   HENT:      Head: Normocephalic and atraumatic.      Mouth/Throat:      Mouth: Mucous membranes are dry.   Eyes:      Conjunctiva/sclera: Conjunctivae normal.   Cardiovascular:      Rate and Rhythm: Regular rhythm. Tachycardia present.   Pulmonary:      Effort: Respiratory distress present.      Breath sounds: No stridor. Rales present. No wheezing or rhonchi.   Abdominal:      Palpations: Abdomen is soft.      Tenderness: There is no abdominal tenderness.   Musculoskeletal:         General: No swelling.      Cervical back: Neck supple.      Right lower leg: No edema.      Left lower leg: No edema.   Skin:     General: Skin is warm and dry.      Capillary Refill: Capillary refill takes less than 2 seconds.   Neurological:      General: No  focal deficit present.      Mental Status: She is alert.   Psychiatric:         Mood and Affect: Mood normal.         ED Course & MDM   ED Course as of 05/07/24 0632   Tue May 07, 2024   0349 XR chest 1 view  IMPRESSION:  Multiple cardiopulmonary abnormalities including probable pulmonary  edema, large left pleural effusion, bilateral hilar and mediastinal  lymphadenopathy and probable airway obstruction on the left resulting  in volume loss of the left hemithorax. CT chest with contrast is  recommended for further evaluation.      Esophageal stent consistent with esophageal cancer.   [EG]   0353 CBC with baseline anemia and otherwise noncontributory [EG]   0353 Troponin T Series, High Sensitivity (0, 2HR, 6HR)(!!)  Minimally elevated troponin just above the normal limit without previous for comparison [EG]   0354 NT Pro-BNP(!)  Significantly elevated without previous for comparison [EG]   0354 Blood Gas Arterial Full Panel(!)  Blood gases performed while patient on high flow not requiring changing of settings [EG]   0354 Microscopic Only, Urine(!)  Evidence of urinary tract infection on urinalysis.  Patient is already received vancomycin which addresses for urinary tract jessica with the sepsis order. [EG]   0534 D-dimer, quantitative(!)  Elevated dimer and patient already being evaluated with a CT scan with contrast [EG]   0535 Troponin T Series, High Sensitivity (0, 2HR, 6HR)(!!)  Repeat troponin is within normal limits [EG]   0535 Comprehensive Metabolic Panel(!)  Noncontributory [EG]   0538 ECG 12 lead  ECG performed on May 7, 2024 at 2:24 AM and interpreted by me at 2:26 AM showing a sinus tachycardia, no axis deviation, low voltage QRS, otherwise intervals within normal limits, left atrial enlargement.  No previous for comparison. [EG]   0632 CT chest w IV contrast  IMPRESSION:  1. Esophageal stent in place with soft tissue attenuation material in  the posterior mediastinum surrounding the esophagus and  central  airways, likely reflecting known locally advanced esophageal squamous  cell carcinoma with possible additional superimposed confluent  adenopathy. There is complete narrowing/filling of the left upper  lobe airway with collapse/consolidation of the left upper lobe. There  is partial filling/narrowing of the left lower lobe airway with  extensive material filling the central bronchi of the left lower  lobe. Extensive patchy ground-glass/consolidative opacities within  the right lung concerning for an infectious/inflammatory process.  2. Moderate bilateral pleural effusions.  3. Spiculated bilateral pulmonary nodules, likely metastatic.  4. Small pericardial effusion.  5. Somewhat nodular interlobular septal thickening at the medial  right lung base may reflect lymphangitic carcinomatosis.   [EG]      ED Course User Index  [EG] Adrianne Zuñiga MD         Diagnoses as of 05/07/24 0632   Sepsis, due to unspecified organism, unspecified whether acute organ dysfunction present (Multi)   Urinary tract infection without hematuria, site unspecified   Healthcare-associated pneumonia   Acute on chronic respiratory failure with hypoxia (Multi)   Congestive heart failure, unspecified HF chronicity, unspecified heart failure type (Multi)   Pleural effusion due to another disorder       Medical Decision Making    HPI:  As Above  PMHx/PSHx/Meds/Allergies/SH/FH as per nursing documentation and reviewed.  Review of systems: Total of 10 systems reviewed and otherwise negative except as noted elsewhere    DDX: As described in MDM    If performed, radiology listed above interpreted by me and confirmed by the Radiologist.  Medications administered during this visit (name and route): see MAR  Social determinants of health considered for this visit: lives at home  If performed, EKG interpreted by me and detailed above    MDM Summary/considerations:  63-year-old female presenting with acute on chronic respiratory failure  that is multifactorial.  She has a left-sided pleural effusion that may be bacterial versus malignant versus cardiac.  Patient has a history of esophageal cancer and patient will be admitted to her primary care doctor's service Dr. Deal in the stepdown unit.  She is stable on high flow nasal cannula that will not require her to be admitted to the ICU.  She has evidence of urinary tract infection on her urinalysis and the effusion may be from a bacterial source.  She was meeting SIRS criteria on arrival and was given vancomycin and ordered a 30 cc/kg bolus.  The entire bolus was not completed as her chest x-ray shows a large pleural effusion and there is concern for further fluid overload.  She received 1 L and has had vast improvement in her heart rate, but it is still above 100.  As the patient has a new oxygen demand and there is the evidence of the pleural effusion she is being treated with Lasix.  Patient was evaluated with a CT scan of the chest    The patient was seen and triaged by our nursing/medic staff, their vitals were taken and the staff notes were reviewed.  If the patient arrived by an EMS squad or an outside agency, we discussed the case with transporting EMS medic, police, or other historians. My initial assessment was attention to their airway, breathing, and circulatory status.  We addressed any immediate or life threatening findings and completed a medical history and a physical exam if the patient or those legally responsible were in agreement with this.     **Disclaimer:  This note was dictated by speech recognition technology.  Minor errors in transcription may be present.  Please contact for clarification or corrections.      Amount and/or Complexity of Data Reviewed  External Data Reviewed: labs, radiology, ECG and notes.  Labs: ordered. Decision-making details documented in ED Course.  Radiology: ordered and independent interpretation performed. Decision-making details documented in ED  Course.  ECG/medicine tests: ordered and independent interpretation performed. Decision-making details documented in ED Course.        Procedure  Procedures     Adrianne Zuñiga MD  05/07/24 0664

## 2024-05-07 NOTE — NURSING NOTE
Pt resting in bed. No complaints or concerns. Call light within reach.  Pt continues on IV atb per order.  Dr. Deal aware that dietician put in recommendations for tube feeding and also that pt has radiation on Friday.

## 2024-05-08 PROBLEM — J90 PLEURAL EFFUSION, BILATERAL: Status: ACTIVE | Noted: 2024-05-08

## 2024-05-08 PROBLEM — J96.01 ACUTE RESPIRATORY FAILURE WITH HYPOXIA (MULTI): Status: ACTIVE | Noted: 2024-05-08

## 2024-05-08 PROBLEM — C15.9 ESOPHAGEAL CANCER (MULTI): Status: ACTIVE | Noted: 2024-05-08

## 2024-05-08 LAB
ANION GAP BLDA CALCULATED.4IONS-SCNC: 9 MMO/L (ref 10–25)
ANION GAP SERPL CALC-SCNC: 14 MMOL/L
APPARATUS: ABNORMAL
ARTERIAL PATENCY WRIST A: POSITIVE
BASE EXCESS BLDA CALC-SCNC: 2.5 MMOL/L (ref -2–3)
BODY TEMPERATURE: 37 DEGREES CELSIUS
BUN SERPL-MCNC: 28 MG/DL (ref 8–25)
CA-I BLDA-SCNC: 1.37 MMOL/L (ref 1.1–1.33)
CALCIUM SERPL-MCNC: 9.7 MG/DL (ref 8.5–10.4)
CHLORIDE BLDA-SCNC: 105 MMOL/L (ref 98–107)
CHLORIDE SERPL-SCNC: 103 MMOL/L (ref 97–107)
CO2 SERPL-SCNC: 23 MMOL/L (ref 24–31)
CPAP: 8 CM H2O
CREAT SERPL-MCNC: 1 MG/DL (ref 0.4–1.6)
EGFRCR SERPLBLD CKD-EPI 2021: 63 ML/MIN/1.73M*2
ERYTHROCYTE [DISTWIDTH] IN BLOOD BY AUTOMATED COUNT: 18.6 % (ref 11.5–14.5)
GLUCOSE BLD MANUAL STRIP-MCNC: 107 MG/DL (ref 74–99)
GLUCOSE BLD MANUAL STRIP-MCNC: 136 MG/DL (ref 74–99)
GLUCOSE BLD MANUAL STRIP-MCNC: 164 MG/DL (ref 74–99)
GLUCOSE BLD MANUAL STRIP-MCNC: 176 MG/DL (ref 74–99)
GLUCOSE BLD MANUAL STRIP-MCNC: 94 MG/DL (ref 74–99)
GLUCOSE BLDA-MCNC: 102 MG/DL (ref 74–99)
GLUCOSE SERPL-MCNC: 95 MG/DL (ref 65–99)
HCO3 BLDA-SCNC: 26.2 MMOL/L (ref 22–26)
HCT VFR BLD AUTO: 30 % (ref 36–46)
HCT VFR BLD EST: 28 % (ref 36–46)
HGB BLD-MCNC: 9.1 G/DL (ref 12–16)
HGB BLDA-MCNC: 9.3 G/DL (ref 12–16)
INHALED O2 CONCENTRATION: 50 %
LACTATE BLDA-SCNC: 0.6 MMOL/L (ref 0.4–2)
LEGIONELLA AG UR QL: NEGATIVE
MAGNESIUM SERPL-MCNC: 1.9 MG/DL (ref 1.6–3.1)
MCH RBC QN AUTO: 24.6 PG (ref 26–34)
MCHC RBC AUTO-ENTMCNC: 30.3 G/DL (ref 32–36)
MCV RBC AUTO: 81 FL (ref 80–100)
NRBC BLD-RTO: 0 /100 WBCS (ref 0–0)
OXYHGB MFR BLDA: 93.7 % (ref 94–98)
PCO2 BLDA: 36 MM HG (ref 38–42)
PH BLDA: 7.47 PH (ref 7.38–7.42)
PHOSPHATE SERPL-MCNC: 3.4 MG/DL (ref 2.5–4.5)
PLATELET # BLD AUTO: 206 X10*3/UL (ref 150–450)
PO2 BLDA: 68 MM HG (ref 85–95)
POTASSIUM BLDA-SCNC: 3.4 MMOL/L (ref 3.5–5.3)
POTASSIUM SERPL-SCNC: 3.7 MMOL/L (ref 3.4–5.1)
RBC # BLD AUTO: 3.7 X10*6/UL (ref 4–5.2)
S PNEUM AG UR QL: NEGATIVE
SAO2 % BLDA: 95 % (ref 94–100)
SODIUM BLDA-SCNC: 137 MMOL/L (ref 136–145)
SODIUM SERPL-SCNC: 140 MMOL/L (ref 133–145)
SPECIMEN DRAWN FROM PATIENT: ABNORMAL
VANCOMYCIN SERPL-MCNC: 31.9 UG/ML (ref 10–20)
VENTILATOR MODE: ABNORMAL
WBC # BLD AUTO: 6.5 X10*3/UL (ref 4.4–11.3)

## 2024-05-08 PROCEDURE — 2500000005 HC RX 250 GENERAL PHARMACY W/O HCPCS

## 2024-05-08 PROCEDURE — 87186 SC STD MICRODIL/AGAR DIL: CPT | Mod: WESLAB

## 2024-05-08 PROCEDURE — 2500000004 HC RX 250 GENERAL PHARMACY W/ HCPCS (ALT 636 FOR OP/ED)

## 2024-05-08 PROCEDURE — 80202 ASSAY OF VANCOMYCIN: CPT

## 2024-05-08 PROCEDURE — 99291 CRITICAL CARE FIRST HOUR: CPT

## 2024-05-08 PROCEDURE — 94799 UNLISTED PULMONARY SVC/PX: CPT

## 2024-05-08 PROCEDURE — 82947 ASSAY GLUCOSE BLOOD QUANT: CPT

## 2024-05-08 PROCEDURE — 36415 COLL VENOUS BLD VENIPUNCTURE: CPT

## 2024-05-08 PROCEDURE — 94660 CPAP INITIATION&MGMT: CPT

## 2024-05-08 PROCEDURE — 84100 ASSAY OF PHOSPHORUS: CPT

## 2024-05-08 PROCEDURE — 83735 ASSAY OF MAGNESIUM: CPT

## 2024-05-08 PROCEDURE — P9045 ALBUMIN (HUMAN), 5%, 250 ML: HCPCS | Mod: JZ

## 2024-05-08 PROCEDURE — 2020000001 HC ICU ROOM DAILY

## 2024-05-08 PROCEDURE — 36600 WITHDRAWAL OF ARTERIAL BLOOD: CPT

## 2024-05-08 PROCEDURE — 84132 ASSAY OF SERUM POTASSIUM: CPT

## 2024-05-08 PROCEDURE — 9420000001 HC RT PATIENT EDUCATION 5 MIN

## 2024-05-08 PROCEDURE — 94640 AIRWAY INHALATION TREATMENT: CPT

## 2024-05-08 PROCEDURE — 2500000001 HC RX 250 WO HCPCS SELF ADMINISTERED DRUGS (ALT 637 FOR MEDICARE OP)

## 2024-05-08 PROCEDURE — 2500000004 HC RX 250 GENERAL PHARMACY W/ HCPCS (ALT 636 FOR OP/ED): Mod: JZ

## 2024-05-08 PROCEDURE — 82533 TOTAL CORTISOL: CPT | Mod: WESLAB

## 2024-05-08 PROCEDURE — 85027 COMPLETE CBC AUTOMATED: CPT

## 2024-05-08 PROCEDURE — 2500000002 HC RX 250 W HCPCS SELF ADMINISTERED DRUGS (ALT 637 FOR MEDICARE OP, ALT 636 FOR OP/ED)

## 2024-05-08 PROCEDURE — 2500000004 HC RX 250 GENERAL PHARMACY W/ HCPCS (ALT 636 FOR OP/ED): Performed by: NURSE PRACTITIONER

## 2024-05-08 PROCEDURE — C9113 INJ PANTOPRAZOLE SODIUM, VIA: HCPCS

## 2024-05-08 PROCEDURE — 99232 SBSQ HOSP IP/OBS MODERATE 35: CPT | Performed by: NURSE PRACTITIONER

## 2024-05-08 PROCEDURE — 5A0945A ASSISTANCE WITH RESPIRATORY VENTILATION, 24-96 CONSECUTIVE HOURS, HIGH NASAL FLOW/VELOCITY: ICD-10-PCS | Performed by: INTERNAL MEDICINE

## 2024-05-08 PROCEDURE — 99497 ADVNCD CARE PLAN 30 MIN: CPT | Performed by: NURSE PRACTITIONER

## 2024-05-08 PROCEDURE — 2500000001 HC RX 250 WO HCPCS SELF ADMINISTERED DRUGS (ALT 637 FOR MEDICARE OP): Performed by: NURSE PRACTITIONER

## 2024-05-08 RX ORDER — ALBUMIN HUMAN 50 G/1000ML
12.5 SOLUTION INTRAVENOUS ONCE
Status: COMPLETED | OUTPATIENT
Start: 2024-05-08 | End: 2024-05-08

## 2024-05-08 RX ORDER — HYDROCODONE BITARTRATE AND HOMATROPINE METHYLBROMIDE ORAL SOLUTION 5; 1.5 MG/5ML; MG/5ML
5 LIQUID ORAL EVERY 6 HOURS PRN
Status: DISCONTINUED | OUTPATIENT
Start: 2024-05-08 | End: 2024-05-15 | Stop reason: HOSPADM

## 2024-05-08 RX ORDER — DEXTROSE 50 % IN WATER (D50W) INTRAVENOUS SYRINGE
25
Status: DISCONTINUED | OUTPATIENT
Start: 2024-05-08 | End: 2024-05-15 | Stop reason: HOSPADM

## 2024-05-08 RX ORDER — PANTOPRAZOLE SODIUM 40 MG/1
40 TABLET, DELAYED RELEASE ORAL DAILY
Status: DISCONTINUED | OUTPATIENT
Start: 2024-05-08 | End: 2024-05-15 | Stop reason: HOSPADM

## 2024-05-08 RX ORDER — AMOXICILLIN 250 MG
2 CAPSULE ORAL 2 TIMES DAILY
Status: DISCONTINUED | OUTPATIENT
Start: 2024-05-08 | End: 2024-05-15

## 2024-05-08 RX ORDER — NOREPINEPHRINE BITARTRATE/D5W 8 MG/250ML
.01-.5 PLASTIC BAG, INJECTION (ML) INTRAVENOUS CONTINUOUS
Status: DISCONTINUED | OUTPATIENT
Start: 2024-05-08 | End: 2024-05-09

## 2024-05-08 RX ORDER — LIDOCAINE AND PRILOCAINE 25; 25 MG/G; MG/G
CREAM TOPICAL
Status: COMPLETED | OUTPATIENT
Start: 2024-05-08 | End: 2024-05-08

## 2024-05-08 RX ORDER — PANTOPRAZOLE SODIUM 40 MG/10ML
40 INJECTION, POWDER, LYOPHILIZED, FOR SOLUTION INTRAVENOUS DAILY
Status: DISCONTINUED | OUTPATIENT
Start: 2024-05-08 | End: 2024-05-15 | Stop reason: HOSPADM

## 2024-05-08 RX ORDER — DEXTROSE 50 % IN WATER (D50W) INTRAVENOUS SYRINGE
12.5
Status: DISCONTINUED | OUTPATIENT
Start: 2024-05-08 | End: 2024-05-15 | Stop reason: HOSPADM

## 2024-05-08 RX ORDER — POTASSIUM CHLORIDE 1.5 G/1.58G
20 POWDER, FOR SOLUTION ORAL ONCE
Status: COMPLETED | OUTPATIENT
Start: 2024-05-08 | End: 2024-05-08

## 2024-05-08 RX ORDER — INSULIN LISPRO 100 [IU]/ML
0-5 INJECTION, SOLUTION INTRAVENOUS; SUBCUTANEOUS EVERY 4 HOURS
Status: DISCONTINUED | OUTPATIENT
Start: 2024-05-08 | End: 2024-05-15 | Stop reason: HOSPADM

## 2024-05-08 RX ORDER — ESOMEPRAZOLE MAGNESIUM 40 MG/1
40 GRANULE, DELAYED RELEASE ORAL DAILY
Status: DISCONTINUED | OUTPATIENT
Start: 2024-05-08 | End: 2024-05-15 | Stop reason: HOSPADM

## 2024-05-08 RX ADMIN — ACETYLCYSTEINE 600 MG: 200 SOLUTION ORAL; RESPIRATORY (INHALATION) at 07:17

## 2024-05-08 RX ADMIN — LEVETIRACETAM 750 MG: 100 SOLUTION ORAL at 10:42

## 2024-05-08 RX ADMIN — ACETAMINOPHEN 650 MG: 160 SOLUTION ORAL at 13:57

## 2024-05-08 RX ADMIN — Medication 40 PERCENT: at 08:00

## 2024-05-08 RX ADMIN — POLYETHYLENE GLYCOL 3350 17 G: 17 POWDER, FOR SOLUTION ORAL at 09:25

## 2024-05-08 RX ADMIN — Medication 50 PERCENT: at 14:51

## 2024-05-08 RX ADMIN — PIPERACILLIN SODIUM AND TAZOBACTAM SODIUM 3.38 G: 3; .375 INJECTION, SOLUTION INTRAVENOUS at 16:22

## 2024-05-08 RX ADMIN — LEVETIRACETAM 750 MG: 100 SOLUTION ORAL at 21:01

## 2024-05-08 RX ADMIN — VANCOMYCIN 1 G: 1 INJECTION, SOLUTION INTRAVENOUS at 00:56

## 2024-05-08 RX ADMIN — ACETYLCYSTEINE 600 MG: 200 SOLUTION ORAL; RESPIRATORY (INHALATION) at 11:01

## 2024-05-08 RX ADMIN — Medication 70 PERCENT: at 08:00

## 2024-05-08 RX ADMIN — Medication 70 PERCENT: at 02:00

## 2024-05-08 RX ADMIN — ACETAMINOPHEN 650 MG: 160 SOLUTION ORAL at 05:53

## 2024-05-08 RX ADMIN — Medication 50 PERCENT: at 20:00

## 2024-05-08 RX ADMIN — LIDOCAINE AND PRILOCAINE: 25; 25 CREAM TOPICAL at 12:46

## 2024-05-08 RX ADMIN — BRIMONIDINE TARTRATE 1 DROP: 2 SOLUTION/ DROPS OPHTHALMIC at 09:24

## 2024-05-08 RX ADMIN — PIPERACILLIN SODIUM AND TAZOBACTAM SODIUM 3.38 G: 3; .375 INJECTION, SOLUTION INTRAVENOUS at 04:21

## 2024-05-08 RX ADMIN — HYDROXYZINE HYDROCHLORIDE 25 MG: 25 TABLET, FILM COATED ORAL at 05:54

## 2024-05-08 RX ADMIN — INSULIN LISPRO 1 UNITS: 100 INJECTION, SOLUTION INTRAVENOUS; SUBCUTANEOUS at 20:00

## 2024-05-08 RX ADMIN — ATORVASTATIN CALCIUM 20 MG: 20 TABLET, FILM COATED ORAL at 21:02

## 2024-05-08 RX ADMIN — VANCOMYCIN 1 G: 1 INJECTION, SOLUTION INTRAVENOUS at 23:33

## 2024-05-08 RX ADMIN — ACETAMINOPHEN 650 MG: 160 SOLUTION ORAL at 21:02

## 2024-05-08 RX ADMIN — NOREPINEPHRINE BITARTRATE 0.01 MCG/KG/MIN: 8 INJECTION, SOLUTION INTRAVENOUS at 10:51

## 2024-05-08 RX ADMIN — ACETYLCYSTEINE 600 MG: 200 SOLUTION ORAL; RESPIRATORY (INHALATION) at 19:34

## 2024-05-08 RX ADMIN — PANTOPRAZOLE SODIUM 40 MG: 40 INJECTION, POWDER, FOR SOLUTION INTRAVENOUS at 09:48

## 2024-05-08 RX ADMIN — SENNOSIDES 17.2 MG: 8.6 TABLET, FILM COATED ORAL at 09:26

## 2024-05-08 RX ADMIN — BRIMONIDINE TARTRATE 1 DROP: 2 SOLUTION/ DROPS OPHTHALMIC at 21:01

## 2024-05-08 RX ADMIN — METHYLPREDNISOLONE SODIUM SUCCINATE 40 MG: 40 INJECTION, POWDER, FOR SOLUTION INTRAMUSCULAR; INTRAVENOUS at 12:40

## 2024-05-08 RX ADMIN — ALBUMIN HUMAN 12.5 G: 0.05 INJECTION, SOLUTION INTRAVENOUS at 01:48

## 2024-05-08 RX ADMIN — Medication 100 MG: at 09:26

## 2024-05-08 RX ADMIN — METHOCARBAMOL 500 MG: 500 TABLET ORAL at 21:01

## 2024-05-08 RX ADMIN — HYDROXYZINE HYDROCHLORIDE 25 MG: 25 TABLET, FILM COATED ORAL at 14:02

## 2024-05-08 RX ADMIN — HYDROMORPHONE HYDROCHLORIDE 0.2 MG: 1 INJECTION, SOLUTION INTRAMUSCULAR; INTRAVENOUS; SUBCUTANEOUS at 17:22

## 2024-05-08 RX ADMIN — POTASSIUM CHLORIDE 20 MEQ: 1.5 FOR SOLUTION ORAL at 05:46

## 2024-05-08 RX ADMIN — PIPERACILLIN SODIUM AND TAZOBACTAM SODIUM 3.38 G: 3; .375 INJECTION, SOLUTION INTRAVENOUS at 21:01

## 2024-05-08 RX ADMIN — IPRATROPIUM BROMIDE AND ALBUTEROL SULFATE 3 ML: 2.5; .5 SOLUTION RESPIRATORY (INHALATION) at 14:50

## 2024-05-08 RX ADMIN — PIPERACILLIN SODIUM AND TAZOBACTAM SODIUM 3.38 G: 3; .375 INJECTION, SOLUTION INTRAVENOUS at 09:25

## 2024-05-08 RX ADMIN — IPRATROPIUM BROMIDE AND ALBUTEROL SULFATE 3 ML: 2.5; .5 SOLUTION RESPIRATORY (INHALATION) at 19:34

## 2024-05-08 RX ADMIN — HYDROXYZINE HYDROCHLORIDE 25 MG: 25 TABLET, FILM COATED ORAL at 21:02

## 2024-05-08 RX ADMIN — IPRATROPIUM BROMIDE AND ALBUTEROL SULFATE 3 ML: 2.5; .5 SOLUTION RESPIRATORY (INHALATION) at 07:17

## 2024-05-08 RX ADMIN — IPRATROPIUM BROMIDE AND ALBUTEROL SULFATE 3 ML: 2.5; .5 SOLUTION RESPIRATORY (INHALATION) at 11:01

## 2024-05-08 RX ADMIN — IPRATROPIUM BROMIDE AND ALBUTEROL SULFATE 3 ML: 2.5; .5 SOLUTION RESPIRATORY (INHALATION) at 23:31

## 2024-05-08 RX ADMIN — ACETYLCYSTEINE 600 MG: 200 SOLUTION ORAL; RESPIRATORY (INHALATION) at 14:50

## 2024-05-08 RX ADMIN — METHYLPREDNISOLONE SODIUM SUCCINATE 40 MG: 40 INJECTION, POWDER, FOR SOLUTION INTRAMUSCULAR; INTRAVENOUS at 21:00

## 2024-05-08 SDOH — HEALTH STABILITY: PHYSICAL HEALTH: ON AVERAGE, HOW MANY MINUTES DO YOU ENGAGE IN EXERCISE AT THIS LEVEL?: 0 MIN

## 2024-05-08 SDOH — SOCIAL STABILITY: SOCIAL NETWORK
DO YOU BELONG TO ANY CLUBS OR ORGANIZATIONS SUCH AS CHURCH GROUPS UNIONS, FRATERNAL OR ATHLETIC GROUPS, OR SCHOOL GROUPS?: NO

## 2024-05-08 SDOH — HEALTH STABILITY: MENTAL HEALTH
HOW OFTEN DO YOU NEED TO HAVE SOMEONE HELP YOU WHEN YOU READ INSTRUCTIONS, PAMPHLETS, OR OTHER WRITTEN MATERIAL FROM YOUR DOCTOR OR PHARMACY?: SOMETIMES

## 2024-05-08 SDOH — ECONOMIC STABILITY: INCOME INSECURITY: HOW HARD IS IT FOR YOU TO PAY FOR THE VERY BASICS LIKE FOOD, HOUSING, MEDICAL CARE, AND HEATING?: NOT HARD AT ALL

## 2024-05-08 SDOH — ECONOMIC STABILITY: FOOD INSECURITY: WITHIN THE PAST 12 MONTHS, YOU WORRIED THAT YOUR FOOD WOULD RUN OUT BEFORE YOU GOT MONEY TO BUY MORE.: NEVER TRUE

## 2024-05-08 SDOH — SOCIAL STABILITY: SOCIAL INSECURITY: WITHIN THE LAST YEAR, HAVE YOU BEEN AFRAID OF YOUR PARTNER OR EX-PARTNER?: NO

## 2024-05-08 SDOH — HEALTH STABILITY: MENTAL HEALTH
STRESS IS WHEN SOMEONE FEELS TENSE, NERVOUS, ANXIOUS, OR CAN'T SLEEP AT NIGHT BECAUSE THEIR MIND IS TROUBLED. HOW STRESSED ARE YOU?: NOT AT ALL

## 2024-05-08 SDOH — SOCIAL STABILITY: SOCIAL INSECURITY
WITHIN THE LAST YEAR, HAVE TO BEEN RAPED OR FORCED TO HAVE ANY KIND OF SEXUAL ACTIVITY BY YOUR PARTNER OR EX-PARTNER?: NO

## 2024-05-08 SDOH — SOCIAL STABILITY: SOCIAL INSECURITY
WITHIN THE LAST YEAR, HAVE YOU BEEN KICKED, HIT, SLAPPED, OR OTHERWISE PHYSICALLY HURT BY YOUR PARTNER OR EX-PARTNER?: NO

## 2024-05-08 SDOH — SOCIAL STABILITY: SOCIAL NETWORK: HOW OFTEN DO YOU ATTEND CHURCH OR RELIGIOUS SERVICES?: NEVER

## 2024-05-08 SDOH — ECONOMIC STABILITY: TRANSPORTATION INSECURITY
IN THE PAST 12 MONTHS, HAS LACK OF TRANSPORTATION KEPT YOU FROM MEETINGS, WORK, OR FROM GETTING THINGS NEEDED FOR DAILY LIVING?: NO

## 2024-05-08 SDOH — ECONOMIC STABILITY: INCOME INSECURITY: IN THE LAST 12 MONTHS, WAS THERE A TIME WHEN YOU WERE NOT ABLE TO PAY THE MORTGAGE OR RENT ON TIME?: NO

## 2024-05-08 SDOH — HEALTH STABILITY: MENTAL HEALTH: HOW OFTEN DO YOU HAVE A DRINK CONTAINING ALCOHOL?: NEVER

## 2024-05-08 SDOH — ECONOMIC STABILITY: INCOME INSECURITY: IN THE PAST 12 MONTHS, HAS THE ELECTRIC, GAS, OIL, OR WATER COMPANY THREATENED TO SHUT OFF SERVICE IN YOUR HOME?: NO

## 2024-05-08 SDOH — ECONOMIC STABILITY: HOUSING INSECURITY
IN THE LAST 12 MONTHS, WAS THERE A TIME WHEN YOU DID NOT HAVE A STEADY PLACE TO SLEEP OR SLEPT IN A SHELTER (INCLUDING NOW)?: NO

## 2024-05-08 SDOH — ECONOMIC STABILITY: FOOD INSECURITY: WITHIN THE PAST 12 MONTHS, THE FOOD YOU BOUGHT JUST DIDN'T LAST AND YOU DIDN'T HAVE MONEY TO GET MORE.: NEVER TRUE

## 2024-05-08 SDOH — SOCIAL STABILITY: SOCIAL INSECURITY: WITHIN THE LAST YEAR, HAVE YOU BEEN HUMILIATED OR EMOTIONALLY ABUSED IN OTHER WAYS BY YOUR PARTNER OR EX-PARTNER?: NO

## 2024-05-08 SDOH — HEALTH STABILITY: PHYSICAL HEALTH: ON AVERAGE, HOW MANY DAYS PER WEEK DO YOU ENGAGE IN MODERATE TO STRENUOUS EXERCISE (LIKE A BRISK WALK)?: 0 DAYS

## 2024-05-08 SDOH — SOCIAL STABILITY: SOCIAL NETWORK: HOW OFTEN DO YOU ATTENT MEETINGS OF THE CLUB OR ORGANIZATION YOU BELONG TO?: NEVER

## 2024-05-08 SDOH — ECONOMIC STABILITY: HOUSING INSECURITY: IN THE LAST 12 MONTHS, HOW MANY PLACES HAVE YOU LIVED?: 2

## 2024-05-08 SDOH — HEALTH STABILITY: MENTAL HEALTH: HOW OFTEN DO YOU HAVE 6 OR MORE DRINKS ON ONE OCCASION?: NEVER

## 2024-05-08 SDOH — SOCIAL STABILITY: SOCIAL NETWORK: HOW OFTEN DO YOU GET TOGETHER WITH FRIENDS OR RELATIVES?: MORE THAN THREE TIMES A WEEK

## 2024-05-08 SDOH — HEALTH STABILITY: MENTAL HEALTH: HOW MANY STANDARD DRINKS CONTAINING ALCOHOL DO YOU HAVE ON A TYPICAL DAY?: PATIENT DOES NOT DRINK

## 2024-05-08 SDOH — SOCIAL STABILITY: SOCIAL NETWORK
IN A TYPICAL WEEK, HOW MANY TIMES DO YOU TALK ON THE PHONE WITH FAMILY, FRIENDS, OR NEIGHBORS?: MORE THAN THREE TIMES A WEEK

## 2024-05-08 SDOH — ECONOMIC STABILITY: TRANSPORTATION INSECURITY
IN THE PAST 12 MONTHS, HAS THE LACK OF TRANSPORTATION KEPT YOU FROM MEDICAL APPOINTMENTS OR FROM GETTING MEDICATIONS?: NO

## 2024-05-08 ASSESSMENT — PAIN - FUNCTIONAL ASSESSMENT
PAIN_FUNCTIONAL_ASSESSMENT: 0-10
PAIN_FUNCTIONAL_ASSESSMENT: FLACC (FACE, LEGS, ACTIVITY, CRY, CONSOLABILITY)
PAIN_FUNCTIONAL_ASSESSMENT: FLACC (FACE, LEGS, ACTIVITY, CRY, CONSOLABILITY)
PAIN_FUNCTIONAL_ASSESSMENT: 0-10

## 2024-05-08 ASSESSMENT — PAIN DESCRIPTION - ORIENTATION
ORIENTATION: MID
ORIENTATION: MID

## 2024-05-08 ASSESSMENT — PAIN DESCRIPTION - DESCRIPTORS
DESCRIPTORS: ACHING

## 2024-05-08 ASSESSMENT — PAIN SCALES - GENERAL
PAINLEVEL_OUTOF10: 0 - NO PAIN
PAINLEVEL_OUTOF10: 0 - NO PAIN
PAINLEVEL_OUTOF10: 3
PAINLEVEL_OUTOF10: 8
PAINLEVEL_OUTOF10: 3
PAINLEVEL_OUTOF10: 3
PAINLEVEL_OUTOF10: 8
PAINLEVEL_OUTOF10: 4
PAINLEVEL_OUTOF10: 3
PAINLEVEL_OUTOF10: 1
PAINLEVEL_OUTOF10: 8

## 2024-05-08 ASSESSMENT — LIFESTYLE VARIABLES
SKIP TO QUESTIONS 9-10: 1
AUDIT-C TOTAL SCORE: 0

## 2024-05-08 ASSESSMENT — PAIN DESCRIPTION - LOCATION
LOCATION: HEAD
LOCATION: BACK

## 2024-05-08 ASSESSMENT — ACTIVITIES OF DAILY LIVING (ADL): LACK_OF_TRANSPORTATION: NO

## 2024-05-08 NOTE — PROGRESS NOTES
Pulmonary Progress Note 05/08/24     Patient seen in follow-up for acute respiratory failure with hypoxia, lung cancer, likely malignant bilateral pleural effusions and left upper lobe collapse    Subjective   Interval History:   Case signed out to me by Dr. Monteiro.  Remains on high flow.  Apparently being initiated on norepinephrine.  Was seen by palliative care and CODE STATUS adjusted to DNR DNI.    Objective   Vital signs in last 24 hours:  Temp:  [36.1 °C (97 °F)-37.6 °C (99.7 °F)] 36.1 °C (97 °F)  Heart Rate:  [] 104  Resp:  [15-37] 35  BP: ()/(54-77) 89/68  FiO2 (%):  [40 %-70 %] 50 %    Intake/Output last 3 shifts:  I/O last 3 completed shifts:  In: 1950 (39.5 mL/kg) [Blood:250; IV Piggyback:1700]  Out: 1350 (27.3 mL/kg) [Urine:1350 (0.8 mL/kg/hr)]  Weight: 49.4 kg   Intake/Output this shift:  I/O this shift:  In: 50 [IV Piggyback:50]  Out: -     Physical Exam  Vitals reviewed.   Constitutional:       Appearance: She is ill-appearing. She is not toxic-appearing.      Comments: Vapotherm   Cardiovascular:      Rate and Rhythm: Normal rate and regular rhythm.   Pulmonary:      Effort: Pulmonary effort is normal.      Breath sounds: Examination of the right-lower field reveals decreased breath sounds. Examination of the left-lower field reveals decreased breath sounds. Decreased breath sounds present. No wheezing.      Comments: Portable bedside butterfly ultrasound probe utilized to scan both bases of each lung, demonstrated moderate left-sided effusion, small right-sided effusion.  Musculoskeletal:      Right lower leg: No edema.      Left lower leg: No edema.   Skin:     General: Skin is warm and dry.   Neurological:      General: No focal deficit present.      Mental Status: She is alert.   Psychiatric:         Mood and Affect: Mood normal.         Behavior: Behavior normal.         Scheduled medications  acetylcysteine, 3 mL, nebulization, 4x daily  [Held by provider] amLODIPine,  10 mg, oral, Daily  aspirin, 81 mg, oral, Once  atorvastatin, 20 mg, oral, Nightly  brimonidine, 1 drop, Right Eye, BID  enoxaparin, 40 mg, subcutaneous, Daily  pantoprazole, 40 mg, oral, Daily   Or  esomeprazole, 40 mg, nasoduodenal tube, Daily   Or  pantoprazole, 40 mg, intravenous, Daily  fentaNYL, 1 patch, transdermal, q72h  fentaNYL, 1 patch, transdermal, q72h  [Held by provider] furosemide, 40 mg, intravenous, Daily  [Held by provider] gabapentin, 250 mg, oral, BID  [Held by provider] gabapentin, 500 mg, oral, Nightly  insulin lispro, 0-5 Units, subcutaneous, q4h  ipratropium-albuteroL, 3 mL, nebulization, q4h while awake  levETIRAcetam, 750 mg, g-tube, BID  [Held by provider] lisinopril, 20 mg, oral, Daily  methylPREDNISolone sodium succinate (PF), 40 mg, intravenous, q8h  oxygen, , inhalation, Continuous - Inhalation  oxygen, , inhalation, Continuous - Inhalation  piperacillin-tazobactam, 3.375 g, intravenous, q6h  polyethylene glycol, 17 g, oral, Daily  sennosides-docusate sodium, 2 tablet, g-tube, BID  thiamine, 100 mg, oral, Daily  vancomycin (Xellia) 1 g in 200 mL, 1 g, intravenous, q24h      Continuous medications  norepinephrine, 0.01-0.5 mcg/kg/min, Last Rate: 0.01 mcg/kg/min (05/08/24 1051)      PRN medications  PRN medications: acetaminophen **OR** acetaminophen **OR** acetaminophen, artificial saliva (yerbas-lyt), dextrose, dextrose, glucagon, glucagon, hydrocodone-homatropine, hydrOXYzine HCL, lidocaine-prilocaine, methocarbamol, ondansetron, vancomycin     Labs:  Lab Results   Component Value Date     05/08/2024    K 3.7 05/08/2024     05/08/2024    CO2 23 (L) 05/08/2024    BUN 28 (H) 05/08/2024    CREATININE 1.00 05/08/2024    GLUCOSE 95 05/08/2024    CALCIUM 9.7 05/08/2024     Lab Results   Component Value Date    WBC 6.5 05/08/2024    HGB 9.1 (L) 05/08/2024    HCT 30.0 (L) 05/08/2024    MCV 81 05/08/2024     05/08/2024       Imaging:  XR chest 1 view    Result Date:  5/8/2024  Interpreted By:  Russell Escalante, STUDY: XR CHEST 1 VIEW;  5/7/2024 10:18 pm   INDICATION: Signs/Symptoms:hypoxemia.   COMPARISON: CXR 05/07/2024, CT chest 05/07/2024   ACCESSION NUMBER(S): [id number]   ORDERING CLINICIAN: SERA SMITH   FINDINGS: There is worsening consolidation within the right upper lobe, right middle lobe. Similar consolidation in the right lower lobe. There is redemonstration of a prominent left basilar opacity representing sub pulmonic effusion. There is redemonstration of complete left upper lobe collapse and left hilar mass representing locally invasive esophageal cancer and malignant lymphadenopathy. No pneumothorax.       Please see above.     MACRO: None.   Signed by: Russell Escalante 5/8/2024 1:28 AM Dictation workstation:   [id number]    CT chest w IV contrast    Result Date: 5/7/2024  Interpreted By:  Chaitanya Lovell, STUDY: CT CHEST W IV CONTRAST;  5/7/2024 4:25 am   INDICATION: Signs/Symptoms:pleural effusion.   COMPARISON: 05/13/2022   ACCESSION NUMBER(S): [id number]   ORDERING CLINICIAN: JENN NEWMAN   TECHNIQUE: Helical data acquisition of the chest was obtained without intravenous contrast. Images were reformatted in axial, coronal, and sagittal planes.   FINDINGS: LOWER NECK AND CHEST WALL:  Right chest port catheter.   MEDIASTINUM/REGINE:No lymphadenopathy. Metallic esophageal stent in place. Trace debris within the stent lumen. Confluent soft tissue density within the posterior paratracheal and paraesophageal mediastinum may reflect confluence of adenopathy or locally advanced malignancy.   CARDIOVASCULAR:  Cardiac chamber size within normal limits. Small pericardial effusion. Right chest port catheter tip terminating at the SVC/RA junction. Aortic caliber normal. Normal caliber of main pulmonary artery. Scattered coronary atherosclerotic calcification.   LUNGS, AIRWAYS, AND PLEURA:  Severe focal narrowing of the left mainstem bronchus. There  is complete occlusion/filling of the majority of the left lower lobar and left lower lobe segmental bronchi. Moderate bilateral pleural effusions. Moderate emphysema. Patchy ground-glass opacities throughout the right lung and dependent right lower lobe consolidation may reflect an infectious/inflammatory process. Somewhat nodular interlobular septal thickening at the medial right lung base may reflect lymphangitic carcinomatosis. There are spiculated pulmonary nodules within the left upper lobe measuring 2.1 cm, right upper lobe measuring 1.3 cm, and posteroinferior right lower lobe measuring 1.5 cm as well as the superior segment of the right lower lobe measuring 1.1 cm. There is complete collapse of the left upper lobe and complete filling of the left upper lobar bronchi.   MUSCULOSKELETAL: No acute osseous abnormality or suspicious osseous lesions. Mild multilevel spinal degenerative change.   UPPER ABDOMEN: Unremarkable.       1. Esophageal stent in place with soft tissue attenuation material in the posterior mediastinum surrounding the esophagus and central airways, likely reflecting known locally advanced esophageal squamous cell carcinoma with possible additional superimposed confluent adenopathy. There is complete narrowing/filling of the left upper lobe airway with collapse/consolidation of the left upper lobe. There is partial filling/narrowing of the left lower lobe airway with extensive material filling the central bronchi of the left lower lobe. Extensive patchy ground-glass/consolidative opacities within the right lung concerning for an infectious/inflammatory process. 2. Moderate bilateral pleural effusions. 3. Spiculated bilateral pulmonary nodules, likely metastatic. 4. Small pericardial effusion. 5. Somewhat nodular interlobular septal thickening at the medial right lung base may reflect lymphangitic carcinomatosis.   Signed by: Chaitanya Lovell 5/7/2024 5:02 AM Dictation workstation:    [id number]    XR chest 1 view    Result Date: 5/7/2024  Interpreted By:  Russell Escalante, STUDY: XR CHEST 1 VIEW;  5/7/2024 2:29 am   INDICATION: Signs/Symptoms:Chest Pain.   COMPARISON: 04/06/2017   ACCESSION NUMBER(S): [id number]   ORDERING CLINICIAN: JENN NEWMAN   FINDINGS: There is an esophageal stent. There is a right chest port terminating over the cavoatrial junction.   There is a large left pleural effusion. There is new bilateral hilar enlargement and nodularity likely representing lymphadenopathy. There is left hemithorax volume loss. There is airspace disease at the left lung base. There is diffuse peribronchovascular and interstitial prominence. No pneumothorax.       Multiple cardiopulmonary abnormalities including probable pulmonary edema, large left pleural effusion, bilateral hilar and mediastinal lymphadenopathy and probable airway obstruction on the left resulting in volume loss of the left hemithorax. CT chest with contrast is recommended for further evaluation.   Esophageal stent consistent with esophageal cancer.   MACRO: None.   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 M: CTBWO_3 CT Dose-Length Product (DLP): 1208  mGy*cm CT Dose Reduction Employed: Automated exposure control (AEC) COMPARISON:  CT brain 04/13/2024.  MRI brain 04/12/2024. RESULT: Acute change:  No evidence of an acute intracranial process. Hemorrhage:  No evidence of acute intracranial hemorrhage. Mass Lesion / Mass Effect: Again noted is the low attenuating mass lesion in the left  medial parietal lobe with confluent surrounding vasogenic edema, intracranial metastasis, better delineated on the prior MR brain 04/12/2024.  Otherwise, no midline shift or herniation. Chronic change:  Patchy nonspecific supratentorial white matter changes likely reflecting chronic microvascular ischemia. Parenchyma:  Mild generalized parenchymal volume loss. Ventricles:  Commensurate with volume loss. Other:  The calvarium, skull base, imaged paranasal sinuses, mastoids, orbits and extracranial soft tissues are unremarkable.  Stereotactic frame is in place.  (topogram) images:  No additional findings.    IMPRESSION: Localization exam.  No acute intracranial process. : VEENA   Transcribe Date/Time: Apr 16 2024 11:46A Dictated by : KAREY AVELAR DO This examination was interpreted and the report reviewed and electronically signed by: KAREY AVELAR DO on Apr 16 2024 11:49AM  EST    CT BRAIN STEREOLOCAL WO IVCON    Result Date: 4/13/2024  * * *Final Report* * * DATE OF EXAM: Apr 13 2024  2:31PM   [facility]   0503  -  CT BRAIN STEREOLOCAL WO IVCON  / ACCESSION #  [id number] PROCEDURE REASON: Other (document in comments)      * * * * Physician Interpretation * * * *  EXAMINATION:  CT STEREOLOCALIZATION BRAIN WITH CONTRAST HISTORY:  63 year old female with left parietal mass, preoperative stereo localization exam. TECHNIQUE: CT head high-resolution stereotactic localization without contrast. M: CTBWO_3 CT Dose-Length Product (DLP): 1336  mGy*cm CT Dose Reduction Employed: Automated exposure control (AEC) COMPARISON:  MR brain with and without contrast 04/12/2024. RESULT: The patient's known left parietal mass lesion is better demonstrated on the 04/12/2024 MRI with and without contrast.  There is surrounding vasogenic edema.  Mild local mass effect with partial effacement of the left occipital horn and atrium of the left lateral ventricle. No acute infarct or hemorrhage.  No midline shift or abnormal  extra-axial fluid collection.  Scattered white matter hypoattenuation, nonspecific, however likely representing sequela of prior chronic microvascular ischemia.  Mild to moderate generalized parenchymal volume loss with commensurate prominence of the cortical sulci and ventricles.  Partial left lateral ventricular effacement, as above.  No hydrocephalus or ventricular trapping. Paranasal sinuses are clear.  Mastoid air cells and middle ear cavities are clear bilaterally.  Bilateral orbits are within normal limits.   Overlying soft tissues demonstrate no focal abnormality.  No destructive calvarial lesion.  Patient is edentulous.    IMPRESSION: Stereotactic localization examination. Known left parietal lesion is better demonstrated on the 04/12/2024 MRI with and without contrast. No acute hemorrhage or large territorial infarct. : PSCB   Transcribe Date/Time: Apr 13 2024  2:33P Dictated by : RAJ DELGADO MD This examination was interpreted and the report reviewed and electronically signed by: ROBERT HAMPTON MD on Apr 13 2024  2:50PM  EST    FL enema single contrast water soluble    Result Date: 4/12/2024  * * *Final Report* * * DATE OF EXAM: Apr 12 2024  2:28PM   [facility]   [id number]  -  XR COLON SINGLE CONTRAST  / ACCESSION #  [id number] PROCEDURE REASON: Assess for fistula      * * * * Physician Interpretation * * * *  WATER SOLUBLE CONTRAST ENEMA HISTORY: Assess for rectovaginal fistula. COMPARISON: CT abdomen and pelvis 02/27/2024 TECHNIQUE: Water soluble-contrast enema was performed after insertion of a rectal tube.  Spot and overhead images were obtained. Contrast: RECTAL:  400 ml of OMNIPAQUE 300 Fluoroscopy radiation summary: Fluoroscopy time: 1:30 (min:sec). Air kerma: 80.5 mGy. RESULT: : No dilated loops of bowel.  Bilateral pelvic tubal ligation clips.  Calcified uterine fibroids. Contrast refluxes to the level of the mid sigmoid colon.  There is no communication with the vagina.  Filling  defects in the opacified colon consistent with feces. Examination ended due to patient discomfort and difficulty retaining the contrast material. The study was performed by CHANCE Sal, under the supervision of Dr. Kaiser, who was present for the critical portion of the exam.   Images associated with this study were submitted for interpretation and reviewed by Dr. Kaiser. ===========    IMPRESSION: NO DEMONSTRATED RECTOVAGINAL FISTULA. : PSCB   Transcribe Date/Time: Apr 12 2024  2:38P Dictated by : CHANCE SAL This examination was interpreted and the report reviewed and electronically signed by: MABEL KAISER MD on Apr 12 2024  3:25PM  EST    MR brain w and wo IV contrast    Result Date: 4/12/2024  * * *Final Report* * * DATE OF EXAM: Apr 12 2024  5:39AM   [facility]   0295  -  MRI BRAIN WO/W IVCON  / ACCESSION #  [id number] PROCEDURE REASON: Metastatic disease evaluation      * * * * Physician Interpretation * * * *  EXAMINATION:  MRI BRAIN WO/W IVCON CLINICAL HISTORY: Gamma knife protocol preop localization exam. TECHNIQUE:  Limited Gamma knife protocol coronal T1 pre and post contrast, axial postcontrast T1, axial T2 sequence. Contrast:  11 mL [facility] IV COMPARISON: MRI of yesterday RESULT: Mass Lesion/ Mass Effect:    Peripherally enhancing cystic and solid right parietal mass measuring approximately 3 x 3 x 3 cm with more heterogenous solid components along its lateral margin.  Moderate surrounding vasogenic edema and mild locoregional mass effect.  No midline shift. No other abnormal enhancing brain lesions. Chronic Change:  Unchanged burden of supra and infratentorial chronic small vessel change. Parenchyma:   No significant volume loss for age. Ventricles:     Normal caliber and morphology. Vasculature:    Major intracranial arterial structures, and dural venous sinuses show typical flow void, suggesting patency by spin echo criteria. Other:  The visualized paranasal  sinuses and mastoid air cells are clear.  The orbits and extracranial soft tissues are unremarkable.    IMPRESSION: Unchanged left parietal cystic and solid mass most compatible with metastasis.  No other enhancing brain lesions. : PSCB   Transcribe Date/Time: Apr 12 2024  6:13A Dictated by : ADIN DARBY MD This examination was interpreted and the report reviewed and electronically signed by: ADIN DARBY MD on Apr 12 2024  6:30AM  EST    MR brain w and wo IV contrast    Result Date: 4/11/2024  * * *Final Report* * * DATE OF EXAM: Apr 10 2024 11:48PM   Q   0295  -  MRI BRAIN WO/W IVCON  / ACCESSION #  [id number] PROCEDURE REASON: Brain/CNS neoplasm, monitor      * * * * Physician Interpretation * * * *  EXAMINATION:  MRI BRAIN WO IVCON CLINICAL HISTORY: Brain CNS neoplasm. TECHNIQUE:  Routine brain MRI protocol without and with contrast including diffusion images. MQ:  MRBWOW_2 Contrast: None COMPARISON: CT brain 04/09/2024 RESULT: Truncated exam secondary to patient tolerance.  No postcontrast sequences are available. Acute Change:   There is no evidence of restricted diffusion to suggest an acute infarct.  There is some restricted diffusion around the periphery of the left parietal mass. Hemorrhage:    No evidence of prior parenchymal hemorrhage on the gradient echo images. Mass Lesion/ Mass Effect: 3 cm left parietal mass and moderate surrounding vasogenic edema. Chronic Change:  Scattered punctate foci of increased T2 and FLAIR signal are noted in the supratentorial and infratentorial white matter which is a nonspecific finding, but likely represents mild to moderate chronic microvascular ischemia. Parenchyma:   No significant volume loss for age. Ventricles:     Normal caliber and morphology. Skull Base:    Hypothalamic and pituitary region are grossly normal.   Craniocervical junction is normal. No significant marrow replacement process. Vasculature:    Major intracranial arterial structures,  and dural venous sinuses show typical flow void, suggesting patency by spin echo criteria. Other:  No abnormal signal in the sinuses or mastoids.  The orbits and extracranial soft tissues are unremarkable.    IMPRESSION: Left parietal mass and surrounding vasogenic edema are unchanged from the CT of yesterday allowing for differences in modality. Truncated exam secondary to patient tolerance with no postcontrast imaging limiting evaluation for additional metastatic disease. : VEENA   Transcribe Date/Time: Apr 11 2024 12:27A Dictated by : ADIN DARBY MD This examination was interpreted and the report reviewed and electronically signed by: ADIN DARBY MD on Apr 11 2024 12:40AM  EST    XR chest 1 view    Result Date: 4/10/2024  * * *Final Report* * * DATE OF EXAM: Apr 9 2024 11:00PM   [facility]   [id number]  -  XR CHEST 1V FRONTAL PORT  / ACCESSION #  [id number] PROCEDURE REASON: Shortness of breath      * * * * Physician Interpretation * * * *  EXAMINATION:  CHEST RADIOGRAPH (PORTABLE SINGLE VIEW AP) Exam Date/Time:  4/9/2024 11:00 PM Clinical History: Shortness of breath MQ:  XCPMC_6 Comparison:  Earlier the same day. RESULT: Lines, tubes, and devices:  Stable right-sided Port-A-Cath and esophageal stent.  Lungs and pleura:  No significant change of small left pleural effusion and associated basilar atelectasis.  Stable left parahilar opacity also related to atelectasis..  No pneumothorax.  Known metastatic lung nodules at the seen on prior chest CT.  No pneumothorax. Cardiomediastinal silhouette:  Stable cardiomediastinal silhouette. Other:  .    IMPRESSION: See result. : [facility]   Transcribe Date/Time: Apr 10 2024  7:11A Dictated by : SERENE HELM MD This examination was interpreted and the report reviewed and electronically signed by: SERENE HELM MD on Apr 10 2024  7:16AM  EST    CT angio chest w and wo IV contrast    Result Date: 4/9/2024  * * *Final Report* * * DATE OF EXAM: Apr 9  2024 10:53AM   [facility]   0540  -  CT CHEST W IVCON PE  / ACCESSION #  [id number] PROCEDURE REASON: Pulmonary embolism (PE) suspected, high prob      * * * * Physician Interpretation * * * * RESULT: EXAMINATION:  CHEST CT WITH CONTRAST (PULMONARY EMBOLISM PROTOCOL) CLINICAL HISTORY: Pulmonary embolus suspected Technique:  Spiral CT acquisition of the chest from the thoracic inlet to the upper abdomen following IV contrast.  Axial 1 and 3 mm thick slices plus coronal and sagittal reformatted images. MQ:  CTCP_5 Contrast: mL Omnipaque 300 IV CT Radiation dose: Integrated Dose-length product (DLP) for this visit = mGy*cm CT Dose Reduction Employed: Automated exposure control(AEC) and iterative recon Comparison:  CT chest 2/27/2024 RESULT: Limitations:  Some respiratory motion. Evaluation for thromboembolic disease:      - Right heart chambers:  No thromboembolic disease.      - Main pulmonary arteries:  No thromboembolic disease.      - Lobar pulmonary arteries:  No thromboembolic disease.      - Segmental pulmonary arteries:  Limited due to motion in some areas.      - Subsegmental pulmonary arteries:  Limited due to motion in some areas      - Additional pulmonary artery findings:  The main pulmonary artery is normal in caliber. Lines, tubes, and devices:  Esophageal stent is situated in the mid/lower esophagus. Lung parenchyma and airways: Increased narrowing of the left main bronchus with occlusion of the left upper lobe bronchus and marked narrowing of the left lower lobe bronchus which is increased compared to prior. New lobar atelectasis of the left upper lobe/lingula. Numerous bilateral pulmonary metastases, the largest of which measures up to 2.2 cm are unchanged. Unable to assess the metastases in the atelectatic left upper lobe/lingula. Paramediastinal lung changes, presumably due to prior radiation therapy, similar to prior. There is again pulmonary emphysema. Pleural space:  Small left pleural effusion. Lower  neck, lymph nodes, and mediastinum:  There is again esophageal stent. There is again soft tissue thickening along the left side of the mediastinum (series 8 image 97), which extends to the hilar region and causes worsened narrowing of the left main bronchus/left lower lobe bronchus and occlusion of the left upper lobe bronchus. The amount of soft tissue appears more prominent compared to prior but is difficult to measure. Heart, pericardium, and thoracic vessels:  The heart is not enlarged.   There is a small pericardial effusion.  No thoracic aortic aneurysm.   Atheromatous calcification of the aorta. Bones and soft tissues:  Degenerative changes of the spine. Upper abdomen:  Water density lesion posterior right hepatic lobe. Localizer images: No additional findings.    IMPRESSION: Evaluation of some of the small peripheral pulmonary arteries is limited by respiratory motion. No definite CT evidence of pulmonary embolus otherwise. Increased amount of mediastinal soft tissue which encases the left-sided bronchi with occlusion of the left upper lobe bronchus and increased narrowing of the left main and left lower lobe bronchi. New complete atelectasis of the left upper lobe. The visualized bilateral pulmonary metastases are grossly unchanged. Small left pleural effusion. Small pericardial effusion. Transcribed Using Voice Recognition Transcribe Date/Time: Apr 9 2024 11:41A Dictated by: JO CORDOVA MD This examination was interpreted and the report reviewed and electronically signed by: JO CORDOVA MD on Apr 9 2024 12:07PM  EST    CT head wo IV contrast    Result Date: 4/9/2024  * * *Final Report* * * DATE OF EXAM: Apr 9 2024 10:52AM   [facility]   0504  -  CT BRAIN WO IVCON  / ACCESSION #  [id number] PROCEDURE REASON: Seizure, focal (Ped 0-18y)      * * * * Physician Interpretation * * * * RESULT: EXAMINATION: CT BRAIN WO IVCON CLINICAL HISTORY: Altered mental status, seizure TECHNIQUE:  Serial axial images without IV  contrast were obtained from the vertex to the foramen magnum. MQ:  CTBWO_3 CT Radiation dose: Integrated Dose-Length Product (DLP) for this visit =   1187  mGy*cm CT Dose Reduction Employed: Automated exposure control(AEC) and iterative recon COMPARISON: Head CT 10/27/2023 RESULT: Post-operative change: None. Acute change: No evidence of an acute infarct or other acute parenchymal process. Hemorrhage: No evidence of acute intracranial hemorrhage. ECASS hemorrhagic transformation score: Not Applicable Mass Lesion / Mass Effect: There is a new 3 cm left parietal mass with localized sulcal effacement and surrounding vasogenic edema. Chronic change: Scattered patchy foci of low attenuation are present within supratentorial white matter which is a nonspecific finding but likely represents mild microvascular ischemia. Parenchyma: There is mild generalized volume loss.  The brain parenchyma is otherwise within normal limits for age. Ventricles: Ventricular enlargement concordant with the degree of parenchymal volume loss. Paranasal sinuses and skull base: The visualized paranasal sinuses are grossly clear.   The skull base and imaged soft tissues are unremarkable. Localizer images: No additional findings.    IMPRESSION: Left parietal mass new from 10/27/2023, with surrounding vasogenic edema and localized mass effect. COMMUNICATION:  Communicated with DR ALFONZO ELIZABETH on 4/9/2024 11:12 AM  via verbal communication. Transcribed Using Voice Recognition Transcribe Date/Time: Apr 9 2024 11:05A Dictated by: NYDIA MADRIGAL MD This examination was interpreted and the report reviewed and electronically signed by: NYDIA MADRIAGL MD on Apr 9 2024 11:16AM  EST    XR chest 1 view    Result Date: 4/9/2024  * * *Final Report* * * DATE OF EXAM: Apr 9 2024  8:13AM   EUX   [id number]  -  XR CHEST 1V FRONTAL PORT  / ACCESSION #  [id number] PROCEDURE REASON: Shortness of breath      * * * * Physician Interpretation * * * * RESULT:  EXAMINATION:  CHEST RADIOGRAPH (PORTABLE SINGLE VIEW AP) Exam Date/Time:  4/9/2024 8:13 AM CLINICAL HISTORY: Shortness of breath MQ:  XCPR_5 Comparison:  02/29/2024 RESULT: Lines, tubes, and devices:  Right IJ port, tip terminating at the cavoatrial junction.  The esophageal stent with no significant change in position, superior margin at the level of the clavicles. Lungs and pleura:  Mild left basilar opacities, likely subsegmental atelectasis.  No substantial pleural effusion or pneumothorax. Cardiomediastinal silhouette:  Stable cardiomediastinal silhouette with masslike soft tissue prominence from the level of the aortic arch to the izabela.    IMPRESSION: See result. Transcribed Using Voice Recognition Transcribe Date/Time: Apr 9 2024  8:16A Dictated by: NYDIA MADRIGAL MD This examination was interpreted and the report reviewed and electronically signed by: NYDIA MADRIGAL MD on Apr 9 2024  8:20AM  EST              Assessment/Plan   Principal Problem:    Sepsis, due to unspecified organism, unspecified whether acute organ dysfunction present (Multi)  Active Problems:    Acute respiratory failure with hypoxia (Multi)    Pleural effusion, bilateral    Esophageal cancer (Multi)    Acute on chronic respiratory failure with hypoxia: Likely in the setting of left upper lobe collapse, advanced metastatic esophageal carcinoma, left upper lobe collapse  Continue with Vapotherm  Left upper lobe collapse/postobstructive pneumonia  Continue with empiric antibiotics pending further cultures  Bronchoscopy is a consideration to evaluate left upper lobe but likely related to tumor.  Given clinical status, will defer at this time.  Especially with goals of care  Bronchodilators  Bilateral pleural effusions: Per my ultrasound showing more pronounced on the left than the right.  Unlikely to be contributing to her disease.  Given though her admission reason was shortness of breath, not unreasonable to tap the left effusion to see  if it aids in any relief  IR thoracentesis ordered by my colleague  Will follow-up on clinical status post as well as results  Guarded prognosis.  Discussed with palliative care and CODE STATUS has been adjusted.    We will continue to follow       LOS: 1 day       Aldair Tamayo MD  Pulmonary/Critical Care medicine

## 2024-05-08 NOTE — PROGRESS NOTES
"Nutrition Follow up Note    Nutrition Assessment      Transferred to ICU d/t respiratory distress. Spoke with RN. Prefers to run continuous tube feed, will provide recommendations at this time. Records indicate 16# weight difference between 5/7 and 5/8, RN re-weighed patient and verifies weight at 49.1 kg.    Nutrition History:  Food and Nutrient History: PEG tube feeds     Food Allergies/Intolerances:   Milk products  GI Symptoms: None  Oral Problems: Swallowing difficulty    Anthropometrics:  Ht: 167.6 cm (5' 5.98\"), Wt: 49.1 kg (108 lb 3.9 oz), BMI: 17.48  IBW/kg (Dietitian Calculated): 59.09 kg  Percent of IBW: 83 %     Weight Change:  Daily Weight  05/08/24 : 49.1 kg (108 lb 3.9 oz)  12/21/22 : 59.4 kg (131 lb)  10/19/22 : 63.1 kg (139 lb 3 oz)  08/02/22 : 58.5 kg (129 lb)  05/27/22 : 60.8 kg (134 lb)  04/28/22 : 61.2 kg (135 lb)  04/08/22 : 61.2 kg (135 lb)  08/27/20 : 62.1 kg (137 lb)  06/16/20 : 59.9 kg (132 lb)     Nutrition Focused Physical Exam Findings:   Subcutaneous Fat Loss  Orbital Fat Pads: Severe (dark circles, hollowing and loose skin)  Buccal Fat Pads: Severe (hollow, sunken and narrow face)  Triceps: Severe (negligible fat tissue)  Ribs: Defer    Muscle Wasting  Temporalis: Severe (hollowed scooping depression)  Pectoralis (Clavicular Region): Severe (protruding prominent clavicle)  Deltoid/Trapezius: Severe (squared shoulders, acromion process prominent)  Interosseous: Severe (depressed area between thumb and forefinger)  Trapezius/Infraspinatus/Supraspinatus (Scapular Region): Defer  Quadriceps: Defer  Gastrocnemius: Severe (minimal muscle definition)    Nutrition Significant Labs:  Lab Results   Component Value Date    WBC 6.5 05/08/2024    HGB 9.1 (L) 05/08/2024    HCT 30.0 (L) 05/08/2024     05/08/2024    CHOL 164 04/28/2022    TRIG 158 (H) 06/16/2020    HDL 53.6 04/28/2022    ALT 9 05/07/2024    AST 14 05/07/2024     05/08/2024    K 3.7 05/08/2024     05/08/2024    " CREATININE 1.00 05/08/2024    BUN 28 (H) 05/08/2024    CO2 23 (L) 05/08/2024    TSH 1.46 08/02/2022    INR 1.0 12/29/2021    HGBA1C 4.9 04/28/2022     Nutrition Specific Medications:  acetylcysteine, 3 mL, nebulization, 4x daily  [Held by provider] amLODIPine, 10 mg, oral, Daily  aspirin, 81 mg, oral, Once  atorvastatin, 20 mg, oral, Nightly  brimonidine, 1 drop, Right Eye, BID  enoxaparin, 40 mg, subcutaneous, Daily  fentaNYL, 1 patch, transdermal, q72h  fentaNYL, 1 patch, transdermal, q72h  [Held by provider] furosemide, 40 mg, intravenous, Daily  gabapentin, 250 mg, oral, BID  gabapentin, 500 mg, oral, Nightly  insulin lispro, 0-5 Units, subcutaneous, q4h  ipratropium-albuteroL, 3 mL, nebulization, q4h while awake  levETIRAcetam, 750 mg, g-tube, BID  [Held by provider] lisinopril, 20 mg, oral, Daily  oxygen, , inhalation, Continuous - Inhalation  oxygen, , inhalation, Continuous - Inhalation  pantoprazole, 40 mg, oral, Daily  piperacillin-tazobactam, 3.375 g, intravenous, q6h  polyethylene glycol, 17 g, oral, Daily  sennosides, 2 tablet, oral, BID  thiamine, 100 mg, oral, Daily  vancomycin (Xellia) 1 g in 200 mL, 1 g, intravenous, q24h         Dietary Orders (From admission, onward)       Start     Ordered    05/08/24 0434  NPO Diet; Effective now  Diet effective now         05/08/24 0433                   Estimated Needs:   Estimated Energy Needs  Total Energy Estimated Needs (kCal):  (6199-6002)  Total Estimated Energy Need per Day (kCal/kg):  (35-40)  Method for Estimating Needs: Actual Wt    Estimated Protein Needs  Total Protein Estimated Needs (g):  (59-98)  Total Protein Estimated Needs (g/kg):  (1.2-2.0)  Method for Estimating Needs: Actual Wt    Estimated Fluid Needs  Total Fluid Estimated Needs (mL):  (6972-5595)  Method for Estimating Needs: 1 mL/kcal        Nutrition Diagnosis   Nutrition Diagnosis:  Malnutrition Diagnosis  Patient has Malnutrition Diagnosis: Yes  Diagnosis Status: New  Malnutrition  Diagnosis: Severe malnutrition related to chronic disease or condition  As Evidenced by: Severe subcutaneous fat and muscle wasting    Nutrition Diagnosis  Patient has Nutrition Diagnosis: Yes  Diagnosis Status (1): Ongoing  Nutrition Diagnosis 1: Inadequate enteral nutrition infusion  Related to (1): decreased ability to consume sufficient energy  As Evidenced by (1): NPO       Nutrition Interventions/Recommendations   Nutrition Interventions and Recommendations:    Nutrition Prescription:  Individualized Nutrition Prescription Provided for : 3228-7141 calories,  gm protein to be provided via enteral nutrition    Nutrition Interventions:   Food and/or Nutrient Delivery Interventions  Interventions: Enteral intake  Enteral Intake: Modify composition of enteral nutrition, Modify rate of enteral nutrition  Goal: If tube feed is initiated, recommend: [employer] 1.0 goal rate @80 mL/Hr to provide 1920 calories, 95 gm protein, 1517 ml free water. Suggest start @40 mL/Hr and increase by 10 mL Q4H until goal. Patient will need additional 100 mL water flushes Q6H to meet hydration needs.    Education Documentation  No documentation found.         Nutrition Monitoring and Evaluation   Monitoring/Evaluation:   Food/Nutrient Related History Monitoring  Monitoring and Evaluation Plan: Enteral and parenteral nutrition intake  Enteral and Parenteral Nutrition Intake: Enteral nutrition formula/solution  Criteria: Monitoring for tube feed initiation    Body Composition/Growth/Weight History  Monitoring and Evaluation Plan: Weight  Weight: Measured weight  Criteria: Patient will maintain/gain weight       Time Spent/Follow-up:   Follow Up  Time Spent (min): 30 minutes  Last Date of Nutrition Visit: 05/08/24  Nutrition Follow-Up Needed?: 3-5 days  Follow up Comment: 5/10/24

## 2024-05-08 NOTE — DOCUMENTATION CLARIFICATION NOTE
PATIENT:               TAYLOR FERGUSON  ACCT #:                  [id number]  MRN:                       [id number]  :                       1960  ADMIT DATE:       2024 1:52 AM  DISCH DATE:  RESPONDING PROVIDER #:        [id number]          PROVIDER RESPONSE TEXT:    Sepsis with acute Respiratory organ dysfunction Acute Respiratory Failure 2/2 Pneumonia and UTI    CDI QUERY TEXT:    Clarification        Instruction:  Based on your assessment of the patient and the clinical information, please provide the requested documentation by clicking on the appropriate radio button and enter any additional information if prompted.    Question: Is there a diagnosis indicative of a patient meeting SIRS criteria and with organ dysfunction in the setting of UTI/Pneumonia  infections    When answering this query, please exercise your independent professional judgment. The fact that a question is being asked, does not imply that any particular answer is desired or expected.    The patient's clinical indicators include:  Clinical Information:  Found by RN at facility to be 62% on 3L O2 via NC, pt repositioned in bed and given breathing treatment, nurse states patient briefly improved to 80% SPO2, states increased O2 to 4L and pt declined into the 70's.  placed on a NRB and SPO2 up to 94%. RN at facility states the pt had crackles in her lungs    Documented Diagnosis: Sepsis    Clinical Indicators:  Pre-Hospital VS:  139/87;118; 101 temp; 20; 68 RA  -Vital Signs: ED:  36.4;117;37;124/98  -WBC: 7.9/  -Microbiology Results:  Pending Urine Culture  -Band Neutrophil Count/percent Band Neutrophil: Not sig  -Lactic acid: 0.6  -BUN/Creat: 0.9  -Blood cultures:  Pending  -Bilirubin: 0.3  -MAP:  Not Performed  -Meg Coma Scale: 15  -PAO2/FIO2:  10 L  -Procalcitonin:  Not Performed  -Platelets:  207  -Other clinical indicators:  CXR:  Multiple cardiopulmonary abnormalities including probable pulmonary  edema, large left pleural  "effusion, bilateral hilar and mediastinal  lymphadenopathy and probable airway obstruction on the left resulting  in volume loss of the left hemithorax.CT chest with contrast is  recommended for further evaluation.  CT Chest W:  LUNGS, AIRWAYS, AND PLEURA: Severe focal narrowing of the left  mainstem bronchus.There is complete occlusion/filling of the  majority of the left lower lobar and left lower lobe segmental  bronchi.Moderate bilateral pleural effusions.Moderate emphysema.  Patchy ground-glass opacities throughout the right lung and dependent  right lower lobe consolidation may reflect an infectious/inflammatory  process.  UA:  Leuk Esterase:  500; Bacteria 4+    ED PN:  \"63-year-old female presenting with acute on chronic respiratory failure that is multifactorial.\"  \"Healthcare-associated pneumonia\"  \" She has evidence of urinary tract infection on her urinalysis and the effusion may be from a bacterial source.\"    Treatment:  High Flow Oxygen; Vanco IV; Zosyn IV;  NS Bolus (Sepsis Bundle); RT; CT Chest; CXR; BMP;    Risk Factors:  COPD; CKD; Esophogeal CA/Stent; Chronic Respiratory Failure  Options provided:  -- Sepsis with acute Respiratory organ dysfunction Acute Respiratory Failure 2/2 Pneumonia and UTI  -- Sepsis with other organ dysfunction, Please specify additional information below  -- Patient treated for Pneumonia and UTI without sepsis  -- Other - I will add my own diagnosis  -- Refer to Clinical Documentation Reviewer    Query created by: Trudy Reis on 5/8/2024 12:07 PM      Electronically signed by:  LOLIS VALENZUELA MD 5/8/2024 12:13 PM          "

## 2024-05-08 NOTE — PROGRESS NOTES
TCC met with patient. Assessment complete. Patient lives long term at [facility]. Patient states that she would like to go to another facility. TCC explained to patient that we can try and assist her to get to another facility but would likely need to return to [facility] and they can assist with the transfer. Patient states that she can walk short distances. At this time the plan will be to return to [facility] but we can try and get her to another facility. Will follow.      05/08/24 7995   Discharge Planning   Living Arrangements Other (Comment)  ([facility])   Support Systems Family members   Type of Residence Nursing home/residential care   Home or Post Acute Services Post acute facilities (Rehab/SNF/etc)   Type of Post Acute Facility Services Long term care   Patient expects to be discharged to: [facility]   Does the patient need discharge transport arranged? Yes   RoundTrip coordination needed? Yes   Financial Resource Strain   How hard is it for you to pay for the very basics like food, housing, medical care, and heating? Not hard   Housing Stability   In the last 12 months, was there a time when you were not able to pay the mortgage or rent on time? N   In the last 12 months, how many places have you lived? 2   In the last 12 months, was there a time when you did not have a steady place to sleep or slept in a shelter (including now)? N   Transportation Needs   In the past 12 months, has lack of transportation kept you from medical appointments or from getting medications? no   In the past 12 months, has lack of transportation kept you from meetings, work, or from getting things needed for daily living? No   Patient Choice   Provider Choice list and CMS website (https://medicare.gov/care-compare#search) for post-acute Quality and Resource Measure Data were provided and reviewed with: Patient   Patient / Family choosing to utilize agency / facility established prior to hospitalization  Yes

## 2024-05-08 NOTE — H&P
[facility] Critical Care Medicine       Date:  5/7/2024  Patient:  Debbi Segura  YOB: 1960  MRN:  [id number]   Admit Date:  5/7/2024      Chief Complaint   Patient presents with    Respiratory Distress         History of Present Illness:  Debbi Segura is a 63 y.o. year old female patient with Past Medical History of  malignant esophageal cancer with mets to the brain and lungs, HTN, dyslipidemia, severe protein-calorie malnutrition, glaucoma, COPD, CKD stage III that presented to the ED from her nursing facility for shortness of breath.  Pt was noted to be hypoxic with a SpO2 in the 60s at her nursing facility which led to EMS being called. Nursing facility noted that pt had an associated fever as well earlier that day.  Pt was placed on a nonrebreather in the ED and then switched to hiflo to maintain a SpO2 >92%.  Pt was admitted to the hospital SDU under Dr. Deal.     Hospitalist was contacted overnight due to pt having respiratory distress with increasing O2 needs. CXR at that time significant for worsening consolidation within the right upper lobe, right middle lobe.  ABG with a pH 7.52, pCO2 26, pO2 64, HCO3 21.2.  Pt was tachypneic with respiratory rate in the 40s at this time.  Pt was transferred to the ICU on HiFlo NC at 40L/70%.      Interval ICU Events:  5/8: Pt transferred to the ICU for acute hypoxic respiratory distress with increasing O2 needs.  Pt currently on 40L/70% with respiratory rate of 42 breaths per minute.  Pt was tachycardic in the 120s with BP 78/59.  Pt placed on CPAP and given 250cc albumin.  Plan to repeat ABG in the morning prior to removing CPAP.      Medical History:  Past Medical History:   Diagnosis Date    Age-related nuclear cataract, left eye 12/11/2015    Cataract, nuclear sclerotic, left eye    Age-related nuclear cataract, left eye 12/11/2015    Age-related nuclear cataract of left eye    Age-related nuclear cataract, right eye 12/11/2015    Cataract,  nuclear sclerotic, right eye    Age-related nuclear cataract, right eye 12/11/2015    Age-related nuclear cataract of right eye    Age-related nuclear cataract, right eye 12/11/2015    Age-related nuclear cataract of right eye    Low-tension glaucoma, bilateral, indeterminate stage 11/15/2016    Bilateral low-tension glaucoma, indeterminate stage    Low-tension glaucoma, unspecified eye, stage unspecified 12/11/2015    Low tension glaucoma    Low-tension glaucoma, unspecified eye, stage unspecified 12/11/2015    Glaucoma, low tension    Other specified health status     No known health problems    Personal history of other diseases of the musculoskeletal system and connective tissue 12/01/2014    History of low back pain     Past Surgical History:   Procedure Laterality Date    TUBAL LIGATION  01/06/2017    Tubal Ligation     Medications Prior to Admission   Medication Sig Dispense Refill Last Dose    acetaminophen (Tylenol) 325 mg tablet Take 2 tablets (650 mg) by mouth every 4 hours if needed (PAIN).       amLODIPine (Norvasc) 10 mg tablet Take 1 tablet (10 mg) by mouth once daily.       atorvastatin (Lipitor) 20 mg tablet Take 1 tablet (20 mg) by mouth once daily.       brimonidine (AlphaGAN P) 0.2 % ophthalmic solution Administer 1 drop into the right eye in the morning and 1 drop in the evening.       cholecalciferol (Vitamin D-3) 25 MCG (1000 UT) tablet Take 1 tablet (25 mcg) by mouth. CLARIFY DIRECTIONS       famotidine (Pepcid) 20 mg tablet Take 1 tablet (20 mg) by mouth in the morning and 1 tablet (20 mg) before bedtime.       folic acid (Folvite) 1 mg tablet Take 1 tablet (1 mg) by mouth once daily.       gabapentin (NEURONTIN ORAL) 5 mL by peg tube route once daily.       hydrOXYzine HCL (Atarax) 25 mg tablet 1 tablet (25 mg) by j-tube route every 6 hours if needed for anxiety.       latanoprostene bunod (Vyzulta) 0.024 % drops Administer into affected eye(s). CLARIFY DIRECTIONS AND IF PATIENT HAS  COMPLETED USAGE FOR THREE WEEKS       levETIRAcetam (Keppra) 100 mg/mL solution 7.5 mL (750 mg) by g-tube route 2 times a day.       lisinopril 20 mg tablet Take 1 tablet (20 mg) by mouth once daily.       methocarbamol (Robaxin) 500 mg tablet 1 tablet (500 mg) by g-tube route 3 times a day.       ondansetron (Zofran) 8 mg tablet 1 tablet (8 mg) by g-tube route every 8 hours if needed for nausea or vomiting.       pantoprazole (ProtoNix) 40 mg EC tablet Take 1 tablet (40 mg) by mouth once daily in the morning. Take before meals. Do not crush, chew, or split.       sennosides (Senokot) 8.6 mg tablet Take 2 tablets (17.2 mg) by mouth 2 times a day.       thiamine 100 mg tablet Take 1 tablet (100 mg) by mouth once daily.       UNABLE TO FIND phoressa eye drops  CLARIFY DIRECTIONS FOR USE        Glucose and Milk containing products (dairy)  Social History     Tobacco Use    Smoking status: Never     Passive exposure: Never    Smokeless tobacco: Never   Vaping Use    Vaping status: Never Used   Substance Use Topics    Alcohol use: Never    Drug use: Never     Family History   Problem Relation Name Age of Onset    Glaucoma Mother      Cancer Mother's Brother         Review of Systems:  14 point review of systems was completed and negative except for those specially mention in my HPI    Physical Exam:    Physical Exam  Constitutional:       General: She is in acute distress.      Appearance: She is cachectic. She is ill-appearing.      Interventions: Nasal cannula in place.   HENT:      Head: Normocephalic.      Mouth/Throat:      Mouth: Mucous membranes are dry.      Pharynx: Oropharynx is clear.   Eyes:      Pupils: Pupils are equal, round, and reactive to light.   Cardiovascular:      Rate and Rhythm: Regular rhythm. Tachycardia present.      Pulses: Normal pulses.      Heart sounds: Normal heart sounds.   Pulmonary:      Effort: Tachypnea present.      Breath sounds: Decreased air movement present. Decreased breath  sounds present.      Comments: Diminished breath sounds diffusely over the left lung fields.    Abdominal:      General: Bowel sounds are normal. There is no distension.      Palpations: Abdomen is soft.      Tenderness: There is no abdominal tenderness.      Comments: Yellow/white discharge noted around the site of the peg tube    Musculoskeletal:         General: Normal range of motion.      Cervical back: Normal range of motion.      Right lower leg: No edema.      Left lower leg: No edema.   Skin:     General: Skin is warm and dry.      Capillary Refill: Capillary refill takes less than 2 seconds.      Coloration: Skin is not jaundiced.   Neurological:      Mental Status: She is alert and oriented to person, place, and time.         Vitals:  Most recent :  Vitals:    05/07/24 1500   BP: 97/69   Pulse: (!) 121   Resp: (!) 36   Temp: 36.7 °C (98.1 °F)   SpO2:        24hr Min/Max:  Temp  Min: 36.4 °C (97.6 °F)  Max: 36.7 °C (98.1 °F)  Pulse  Min: 105  Max: 121  BP  Min: 97/69  Max: 137/90  Resp  Min: 19  Max: 37  SpO2  Min: 94 %  Max: 100 %    Vent Settings:  FiO2 (%):  [50 %-90 %] 70 %    Objective:    Scheduled Medications:  acetylcysteine, 3 mL, nebulization, 4x daily  amLODIPine, 10 mg, oral, Daily  aspirin, 81 mg, oral, Once  atorvastatin, 20 mg, oral, Nightly  brimonidine, 1 drop, Right Eye, BID  enoxaparin, 40 mg, subcutaneous, Daily  [START ON 5/8/2024] enoxaparin, 40 mg, subcutaneous, q24h  fentaNYL, 1 patch, transdermal, q72h  fentaNYL, 1 patch, transdermal, q72h  furosemide, 40 mg, intravenous, Daily  gabapentin, 250 mg, oral, BID  gabapentin, 500 mg, oral, Nightly  ipratropium-albuteroL, 3 mL, nebulization, q4h while awake  levETIRAcetam, 750 mg, g-tube, BID  lisinopril, 20 mg, oral, Daily  oxygen, , inhalation, Continuous - Inhalation  pantoprazole, 40 mg, oral, Daily  piperacillin-tazobactam, 3.375 g, intravenous, q6h  polyethylene glycol, 17 g, oral, Daily  sennosides, 2 tablet, oral, BID  thiamine,  100 mg, oral, Daily  [START ON 5/8/2024] vancomycin (Xellia) 1 g in 200 mL, 1 g, intravenous, q24h        Continuous Medications:       PRN Medications:  PRN medications: acetaminophen **OR** acetaminophen **OR** acetaminophen, hydrOXYzine HCL, methocarbamol, ondansetron, vancomycin    Labs/Radiology/Diagnostic Review:  Results for orders placed or performed during the hospital encounter of 05/07/24 (from the past 24 hour(s))   Sars-CoV-2 PCR   Result Value Ref Range    Coronavirus 2019, PCR Not Detected Not Detected   Influenza A, and B PCR   Result Value Ref Range    Flu A Result Not Detected Not Detected    Flu B Result Not Detected Not Detected   Blood Gas Arterial Full Panel   Result Value Ref Range    POCT pH, Arterial 7.45 (H) 7.38 - 7.42 pH    POCT pCO2, Arterial 36 (L) 38 - 42 mm Hg    POCT pO2, Arterial 93 85 - 95 mm Hg    POCT SO2, Arterial 100 94 - 100 %    POCT Oxy Hemoglobin, Arterial 97.1 94.0 - 98.0 %    POCT Hematocrit Calculated, Arterial 28.0 (L) 36.0 - 46.0 %    POCT Sodium, Arterial 134 (L) 136 - 145 mmol/L    POCT Potassium, Arterial 4.0 3.5 - 5.3 mmol/L    POCT Chloride, Arterial 104 98 - 107 mmol/L    POCT Ionized Calcium, Arterial 1.24 1.10 - 1.33 mmol/L    POCT Glucose, Arterial 102 (H) 74 - 99 mg/dL    POCT Lactate, Arterial 0.6 0.4 - 2.0 mmol/L    POCT Base Excess, Arterial 1.1 -2.0 - 3.0 mmol/L    POCT HCO3 Calculated, Arterial 25.0 22.0 - 26.0 mmol/L    POCT Hemoglobin, Arterial 9.4 (L) 12.0 - 16.0 g/dL    POCT Anion Gap, Arterial 9 (L) 10 - 25 mmo/L    Patient Temperature 37.0 degrees Celsius    FiO2 90 %    Site of Arterial Puncture Radial Right     Aamir's Test Positive    Urinalysis with Reflex Culture and Microscopic   Result Value Ref Range    Color, Urine Light-Yellow Light-Yellow, Yellow, Dark-Yellow    Appearance, Urine Turbid (N) Clear    Specific Gravity, Urine 1.013 1.005 - 1.035    pH, Urine 7.0 5.0, 5.5, 6.0, 6.5, 7.0, 7.5, 8.0    Protein, Urine NEGATIVE NEGATIVE, 10  (TRACE), 20 (TRACE) mg/dL    Glucose, Urine Normal Normal mg/dL    Blood, Urine NEGATIVE NEGATIVE    Ketones, Urine NEGATIVE NEGATIVE mg/dL    Bilirubin, Urine NEGATIVE NEGATIVE    Urobilinogen, Urine Normal Normal mg/dL    Nitrite, Urine NEGATIVE NEGATIVE    Leukocyte Esterase, Urine 500 Nidia/µL (A) NEGATIVE   Extra Urine Gray Tube   Result Value Ref Range    Extra Tube Hold for add-ons.    Microscopic Only, Urine   Result Value Ref Range    WBC, Urine >50 (A) 1-5, NONE /HPF    WBC Clumps, Urine FEW Reference range not established. /HPF    RBC, Urine 3-5 NONE, 1-2, 3-5 /HPF    Squamous Epithelial Cells, Urine 1-9 (SPARSE) Reference range not established. /HPF    Bacteria, Urine 4+ (A) NONE SEEN /HPF    Mucus, Urine FEW Reference range not established. /LPF   CBC and Auto Differential   Result Value Ref Range    WBC 7.9 4.4 - 11.3 x10*3/uL    nRBC 0.0 0.0 - 0.0 /100 WBCs    RBC 3.96 (L) 4.00 - 5.20 x10*6/uL    Hemoglobin 10.0 (L) 12.0 - 16.0 g/dL    Hematocrit 32.5 (L) 36.0 - 46.0 %    MCV 82 80 - 100 fL    MCH 25.3 (L) 26.0 - 34.0 pg    MCHC 30.8 (L) 32.0 - 36.0 g/dL    RDW 18.8 (H) 11.5 - 14.5 %    Platelets 207 150 - 450 x10*3/uL    Neutrophils % 77.9 40.0 - 80.0 %    Immature Granulocytes %, Automated 0.4 0.0 - 0.9 %    Lymphocytes % 8.1 13.0 - 44.0 %    Monocytes % 9.5 2.0 - 10.0 %    Eosinophils % 3.8 0.0 - 6.0 %    Basophils % 0.3 0.0 - 2.0 %    Neutrophils Absolute 6.14 1.20 - 7.70 x10*3/uL    Immature Granulocytes Absolute, Automated 0.03 0.00 - 0.70 x10*3/uL    Lymphocytes Absolute 0.64 (L) 1.20 - 4.80 x10*3/uL    Monocytes Absolute 0.75 0.10 - 1.00 x10*3/uL    Eosinophils Absolute 0.30 0.00 - 0.70 x10*3/uL    Basophils Absolute 0.02 0.00 - 0.10 x10*3/uL   Comprehensive Metabolic Panel   Result Value Ref Range    Glucose 96 65 - 99 mg/dL    Sodium 136 133 - 145 mmol/L    Potassium 4.2 3.4 - 5.1 mmol/L    Chloride 100 97 - 107 mmol/L    Bicarbonate 24 24 - 31 mmol/L    Urea Nitrogen 33 (H) 8 - 25 mg/dL     Creatinine 0.90 0.40 - 1.60 mg/dL    eGFR 72 >60 mL/min/1.73m*2    Calcium 9.7 8.5 - 10.4 mg/dL    Albumin 3.0 (L) 3.5 - 5.0 g/dL    Alkaline Phosphatase 90 35 - 125 U/L    Total Protein 6.8 5.9 - 7.9 g/dL    AST 14 5 - 40 U/L    Bilirubin, Total 0.3 0.1 - 1.2 mg/dL    ALT 9 5 - 40 U/L    Anion Gap 12 <=19 mmol/L   Magnesium   Result Value Ref Range    Magnesium 1.70 1.60 - 3.10 mg/dL   NT Pro-BNP   Result Value Ref Range    PROBNP 1,649 (H) 0 - 226 pg/mL   Serial Troponin, Initial ([facility])   Result Value Ref Range    Troponin T, High Sensitivity 16 (HH) <=14 ng/L   Blood Culture    Specimen: Peripheral Venipuncture; Blood culture   Result Value Ref Range    Blood Culture Loaded on Instrument - Culture in progress    Blood Culture    Specimen: Peripheral Venipuncture; Blood culture   Result Value Ref Range    Blood Culture Loaded on Instrument - Culture in progress    D-dimer, quantitative   Result Value Ref Range    D-Dimer Non VTE, Quant (mg/L FEU) 3.09 (H) 0.19 - 0.50 mg/L FEU   Serial Troponin, 2 Hour ([facility])   Result Value Ref Range    Troponin T, High Sensitivity 14 <=14 ng/L   Serial Troponin, 6 Hour ([facility])   Result Value Ref Range    Troponin T, High Sensitivity 16 (HH) <=14 ng/L   Lavender Top   Result Value Ref Range    Extra Tube Hold for add-ons.    MRSA Surveillance for Vancomycin De-escalation, PCR    Specimen: Anterior Nares; Swab   Result Value Ref Range    MRSA PCR Not Detected Not Detected   Blood Gas Arterial Full Panel   Result Value Ref Range    POCT pH, Arterial 7.52 (H) 7.38 - 7.42 pH    POCT pCO2, Arterial 26 (L) 38 - 42 mm Hg    POCT pO2, Arterial 64 (L) 85 - 95 mm Hg    POCT SO2, Arterial 94 94 - 100 %    POCT Oxy Hemoglobin, Arterial 90.3 (L) 94.0 - 98.0 %    POCT Hematocrit Calculated, Arterial 33.0 (L) 36.0 - 46.0 %    POCT Sodium, Arterial 135 (L) 136 - 145 mmol/L    POCT Potassium, Arterial 3.3 (L) 3.5 - 5.3 mmol/L    POCT Chloride, Arterial 105 98 - 107 mmol/L    POCT Ionized Calcium,  Arterial 1.31 1.10 - 1.33 mmol/L    POCT Glucose, Arterial 155 (H) 74 - 99 mg/dL    POCT Lactate, Arterial 3.3 (H) 0.4 - 2.0 mmol/L    POCT Base Excess, Arterial -0.7 -2.0 - 3.0 mmol/L    POCT HCO3 Calculated, Arterial 21.2 (L) 22.0 - 26.0 mmol/L    POCT Hemoglobin, Arterial 11.0 (L) 12.0 - 16.0 g/dL    POCT Anion Gap, Arterial 12 10 - 25 mmo/L    Patient Temperature 37.0 degrees Celsius    FiO2 80 %       CT chest w IV contrast    Result Date: 5/7/2024  Interpreted By:  Chaitanya Lovell, STUDY: CT CHEST W IV CONTRAST;  5/7/2024 4:25 am   INDICATION: Signs/Symptoms:pleural effusion.   COMPARISON: 05/13/2022   ACCESSION NUMBER(S): [id number]   ORDERING CLINICIAN: JENN NEWMAN   TECHNIQUE: Helical data acquisition of the chest was obtained without intravenous contrast. Images were reformatted in axial, coronal, and sagittal planes.   FINDINGS: LOWER NECK AND CHEST WALL:  Right chest port catheter.   MEDIASTINUM/REGINE:No lymphadenopathy. Metallic esophageal stent in place. Trace debris within the stent lumen. Confluent soft tissue density within the posterior paratracheal and paraesophageal mediastinum may reflect confluence of adenopathy or locally advanced malignancy.   CARDIOVASCULAR:  Cardiac chamber size within normal limits. Small pericardial effusion. Right chest port catheter tip terminating at the SVC/RA junction. Aortic caliber normal. Normal caliber of main pulmonary artery. Scattered coronary atherosclerotic calcification.   LUNGS, AIRWAYS, AND PLEURA:  Severe focal narrowing of the left mainstem bronchus. There is complete occlusion/filling of the majority of the left lower lobar and left lower lobe segmental bronchi. Moderate bilateral pleural effusions. Moderate emphysema. Patchy ground-glass opacities throughout the right lung and dependent right lower lobe consolidation may reflect an infectious/inflammatory process. Somewhat nodular interlobular septal thickening at the medial right lung  base may reflect lymphangitic carcinomatosis. There are spiculated pulmonary nodules within the left upper lobe measuring 2.1 cm, right upper lobe measuring 1.3 cm, and posteroinferior right lower lobe measuring 1.5 cm as well as the superior segment of the right lower lobe measuring 1.1 cm. There is complete collapse of the left upper lobe and complete filling of the left upper lobar bronchi.   MUSCULOSKELETAL: No acute osseous abnormality or suspicious osseous lesions. Mild multilevel spinal degenerative change.   UPPER ABDOMEN: Unremarkable.       1. Esophageal stent in place with soft tissue attenuation material in the posterior mediastinum surrounding the esophagus and central airways, likely reflecting known locally advanced esophageal squamous cell carcinoma with possible additional superimposed confluent adenopathy. There is complete narrowing/filling of the left upper lobe airway with collapse/consolidation of the left upper lobe. There is partial filling/narrowing of the left lower lobe airway with extensive material filling the central bronchi of the left lower lobe. Extensive patchy ground-glass/consolidative opacities within the right lung concerning for an infectious/inflammatory process. 2. Moderate bilateral pleural effusions. 3. Spiculated bilateral pulmonary nodules, likely metastatic. 4. Small pericardial effusion. 5. Somewhat nodular interlobular septal thickening at the medial right lung base may reflect lymphangitic carcinomatosis.   Signed by: Chaitanya Lovell 5/7/2024 5:02 AM Dictation workstation:   [id number]    XR chest 1 view    Result Date: 5/7/2024  Interpreted By:  Russell Escalante, STUDY: XR CHEST 1 VIEW;  5/7/2024 2:29 am   INDICATION: Signs/Symptoms:Chest Pain.   COMPARISON: 04/06/2017   ACCESSION NUMBER(S): [id number]   ORDERING CLINICIAN: JENN NEWMAN   FINDINGS: There is an esophageal stent. There is a right chest port terminating over the cavoatrial junction.    There is a large left pleural effusion. There is new bilateral hilar enlargement and nodularity likely representing lymphadenopathy. There is left hemithorax volume loss. There is airspace disease at the left lung base. There is diffuse peribronchovascular and interstitial prominence. No pneumothorax.       Multiple cardiopulmonary abnormalities including probable pulmonary edema, large left pleural effusion, bilateral hilar and mediastinal lymphadenopathy and probable airway obstruction on the left resulting in volume loss of the left hemithorax. CT chest with contrast is recommended for further evaluation.   Esophageal stent consistent with esophageal cancer.   MACRO: None.   Signed by: Russell Escalante 5/7/2024 2:37 AM Dictation workstation:   [id number]    I/O:    Intake/Output Summary (Last 24 hours) at 5/7/2024 2352  Last data filed at 5/7/2024 2111  Gross per 24 hour   Intake 1450 ml   Output 1000 ml   Net 450 ml       I have reviewed all medications, laboratory results, and imaging pertinent for today's encounter    Assessment/Plan:    I am currently managing this critically ill patient for the following problems:    Neuro/Psych/Pain Ctrl/Sedation:  Cancer with mets to the brain s/p gamma knife   Anxiety disorder   -Continue neuro checks per protocol.   -Pain control: continue home fentanyl patches, gabapentin, prn tylenol, prn robaxin    -Continue home Keppra   -Continue thiamine   -Hydroxyzine prn for anxiety   -CAM ICU score q-shift  -Sleep/wake cycle hygiene  -Delirium precaution    Respiratory/ENT:  Acute hypoxic respiratory failure 2/2 pleural effusion, lung cancer, possible pneumonia   Metastatic lung cancer   Hx of COPD, baseline O2 2L NC  -Currently on CPAP overnight   -Continue HiFlo NC during the day, wean as tolerated   -Continuous pulse oximetry, maintain SpO2 >90%  -Mucomyst and duonebs 4x daily   -Pulmonary on consult, consider thoracentesis   -Aggressive pulmonary/bronchial hygiene      Cardiovascular:  Hx of HTN  Hx of hyperlipidemia   -Continue home ASA and atorvastatin   -Hold home lisnopril, amlodipine, and lasix as pt has borderline BP   -Continuous cardiac monitoring   -Goal MAP >65    GI:  Metastatic esophageal cancer s/p stenting   Hx of severe protein-calorie malnutrition   Hx of GERD   -Diet: NPO, continue home tube feeds   -Bowel regimen: senokot and miralax   -GI prophylaxis: continue home PPI  -PRN zofran for nausea   -Dietary consult     Renal/Volume Status (Intra & Extravascular):  UTI  Hx of CKD stage III  -UA positive for 4+ bacteria and leuk esterase  -Urine culture pending, abx per below   -Strict I&O monitoring   -Goal urine output 0.5-1.0 cc/kg/her  -Daily BMP, mag, phos  -Replace electrolytes per unit protocol     Endocrine  -POCT glucose q4h  -SSI insulin if indicated   -Monitor for hypo/hyperglycemia     Infectious Disease:  Concern of sepsis w/ unspecified source (possible pneumonia, UTI, peg tube)  -Continue vanc and zosyn   -MRSA pcr, pending   -Urine legionella and streptococcal antigen, pending   -Urine culture, pending   -Blood cultures, pending   -wound culture, discharge around peg tube   -Monitor WBC and temperature   -ID on consult     Heme/Onc:  Metastatic Cancer   -Palliative consult for goals of care   -Daily CBC monitoring   -Transfuse for Hgb <7    Derm/MSK:  -ICU skin protocol   -Padded pressure points   -PT/OT eval and treat     Ethics/Code Status:  Full Code     :  DVT Prophylaxis: lovenox   GI Prophylaxis: PPI  Bowel Regimen: miralax and senokot   Diet: NPO, tube feed   CVC: n/a  Aida: n/a  Zamarripa: no  Restraints: none   Dispo: transfer to ICU     Critical Care Time:  74 minutes spent in preparing to see patient (I.e. review of medical records), evaluation of diagnostics (I.e. labs, imaging, etc.), documentation, discussing plan of care with patient/ family/ caregiver, and/ or coordination of care with multidisciplinary team. Time does  not include completion of procedure time.        Bill Lemons PA-C  Pulmonary and Critical Care Medicine   [facility]

## 2024-05-08 NOTE — CARE PLAN
Problem: Respiratory  Goal: Clear secretions with interventions this shift  Outcome: Progressing  Goal: Minimize anxiety/maximize coping throughout shift  Outcome: Progressing  Goal: Minimal/no exertional discomfort or dyspnea this shift  Outcome: Progressing  Goal: No signs of respiratory distress (eg. Use of accessory muscles. Peds grunting)  Outcome: Progressing  Goal: Patent airway maintained this shift  Outcome: Progressing  Goal: Tolerate mechanical ventilation evidenced by VS/agitation level this shift  Outcome: Progressing  Goal: Tolerate pulmonary toileting this shift  Outcome: Progressing  Goal: Verbalize decreased shortness of breath this shift  Outcome: Progressing  Goal: Wean oxygen to maintain O2 saturation per order/standard this shift  Outcome: Progressing  Goal: Increase self care and/or family involvement in next 24 hours  Outcome: Progressing     Problem: Skin  Goal: Decreased wound size/increased tissue granulation at next dressing change  Outcome: Progressing  Goal: Participates in plan/prevention/treatment measures  Outcome: Progressing  Goal: Prevent/manage excess moisture  Outcome: Progressing  Goal: Prevent/minimize sheer/friction injuries  Outcome: Progressing  Goal: Promote/optimize nutrition  Outcome: Progressing  Goal: Promote skin healing  Outcome: Progressing     Problem: Fall/Injury  Goal: Not fall by end of shift  Outcome: Progressing  Goal: Be free from injury by end of the shift  Outcome: Progressing  Goal: Verbalize understanding of personal risk factors for fall in the hospital  Outcome: Progressing  Goal: Verbalize understanding of risk factor reduction measures to prevent injury from fall in the home  Outcome: Progressing  Goal: Use assistive devices by end of the shift  Outcome: Progressing  Goal: Pace activities to prevent fatigue by end of the shift  Outcome: Progressing   The patient's goals for the shift include breath better    The clinical goals for the shift include  improve oxygen

## 2024-05-08 NOTE — NURSING NOTE
Attempted to access pt's left chest mediport.  Unable to get a blood return.  Clear dressing applied.  Will attempt again.

## 2024-05-08 NOTE — PROGRESS NOTES
[facility] Critical Care Medicine       Date:  5/8/2024  Patient:  Debbi Segura  YOB: 1960  MRN:  [id number]   Admit Date:  5/7/2024    Chief Complaint   Patient presents with    Respiratory Distress         History of Present Illness:  Debbi Segura is a 63 y.o. year old female patient with Past Medical History of  malignant esophageal cancer with mets to the brain and lungs, HTN, dyslipidemia, severe protein-calorie malnutrition, glaucoma, COPD, CKD stage III that presented to the ED from her nursing facility for shortness of breath.  Pt was noted to be hypoxic with a SpO2 in the 60s at her nursing facility which led to EMS being called. Nursing facility noted that pt had an associated fever as well earlier that day.  Pt was placed on a nonrebreather in the ED and then switched to hiflo to maintain a SpO2 >92%.  Pt was admitted to the hospital SDU under Dr. Deal.      Hospitalist was contacted overnight due to pt having respiratory distress with increasing O2 needs. CXR at that time significant for worsening consolidation within the right upper lobe, right middle lobe.  ABG with a pH 7.52, pCO2 26, pO2 64, HCO3 21.2.  Pt was tachypneic with respiratory rate in the 40s at this time.  Pt was transferred to the ICU on HiFlo NC at 40L/70%.       Interval ICU Events:  5/7: Pt transferred to the ICU for acute hypoxic respiratory distress with increasing O2 needs.  Pt currently on 40L/70% with respiratory rate of 42 breaths per minute.  Pt was tachycardic in the 120s with BP 78/59.  Pt placed on CPAP and given 250cc albumin.  Plan to repeat ABG in the morning prior to removing CPAP.      5/8: Pt wore cpap most of the night until she started coughing and is back on HFNC this morning, tolerating it well. IR consulted for thoracentesis. Pt mildly hypotensive this morning, will start on low-dose levophed. Sending am cortisol to see if pt is adrenally insufficient.    Medical History:  Past Medical  History:   Diagnosis Date    Age-related nuclear cataract, left eye 12/11/2015    Cataract, nuclear sclerotic, left eye    Age-related nuclear cataract, left eye 12/11/2015    Age-related nuclear cataract of left eye    Age-related nuclear cataract, right eye 12/11/2015    Cataract, nuclear sclerotic, right eye    Age-related nuclear cataract, right eye 12/11/2015    Age-related nuclear cataract of right eye    Age-related nuclear cataract, right eye 12/11/2015    Age-related nuclear cataract of right eye    Low-tension glaucoma, bilateral, indeterminate stage 11/15/2016    Bilateral low-tension glaucoma, indeterminate stage    Low-tension glaucoma, unspecified eye, stage unspecified 12/11/2015    Low tension glaucoma    Low-tension glaucoma, unspecified eye, stage unspecified 12/11/2015    Glaucoma, low tension    Other specified health status     No known health problems    Personal history of other diseases of the musculoskeletal system and connective tissue 12/01/2014    History of low back pain     Past Surgical History:   Procedure Laterality Date    TUBAL LIGATION  01/06/2017    Tubal Ligation     Medications Prior to Admission   Medication Sig Dispense Refill Last Dose    acetaminophen (Tylenol) 325 mg tablet Take 2 tablets (650 mg) by mouth every 4 hours if needed (PAIN).       amLODIPine (Norvasc) 10 mg tablet Take 1 tablet (10 mg) by mouth once daily.       atorvastatin (Lipitor) 20 mg tablet Take 1 tablet (20 mg) by mouth once daily.       brimonidine (AlphaGAN P) 0.2 % ophthalmic solution Administer 1 drop into the right eye in the morning and 1 drop in the evening.       cholecalciferol (Vitamin D-3) 25 MCG (1000 UT) tablet Take 1 tablet (25 mcg) by mouth. CLARIFY DIRECTIONS       famotidine (Pepcid) 20 mg tablet Take 1 tablet (20 mg) by mouth in the morning and 1 tablet (20 mg) before bedtime.       folic acid (Folvite) 1 mg tablet Take 1 tablet (1 mg) by mouth once daily.       gabapentin (NEURONTIN  ORAL) 5 mL by peg tube route once daily.       hydrOXYzine HCL (Atarax) 25 mg tablet 1 tablet (25 mg) by j-tube route every 6 hours if needed for anxiety.       latanoprostene bunod (Vyzulta) 0.024 % drops Administer into affected eye(s). CLARIFY DIRECTIONS AND IF PATIENT HAS COMPLETED USAGE FOR THREE WEEKS       levETIRAcetam (Keppra) 100 mg/mL solution 7.5 mL (750 mg) by g-tube route 2 times a day.       lisinopril 20 mg tablet Take 1 tablet (20 mg) by mouth once daily.       methocarbamol (Robaxin) 500 mg tablet 1 tablet (500 mg) by g-tube route 3 times a day.       ondansetron (Zofran) 8 mg tablet 1 tablet (8 mg) by g-tube route every 8 hours if needed for nausea or vomiting.       pantoprazole (ProtoNix) 40 mg EC tablet Take 1 tablet (40 mg) by mouth once daily in the morning. Take before meals. Do not crush, chew, or split.       sennosides (Senokot) 8.6 mg tablet Take 2 tablets (17.2 mg) by mouth 2 times a day.       thiamine 100 mg tablet Take 1 tablet (100 mg) by mouth once daily.       UNABLE TO FIND phoressa eye drops  CLARIFY DIRECTIONS FOR USE        Glucose and Milk containing products (dairy)  Social History     Tobacco Use    Smoking status: Never     Passive exposure: Never    Smokeless tobacco: Never   Vaping Use    Vaping status: Never Used   Substance Use Topics    Alcohol use: Never    Drug use: Never     Family History   Problem Relation Name Age of Onset    Glaucoma Mother      Cancer Mother's Brother         Hospital Medications:           Current Facility-Administered Medications:     acetaminophen (Tylenol) tablet 650 mg, 650 mg, oral, q4h PRN, 650 mg at 05/07/24 0952 **OR** acetaminophen (Tylenol) oral liquid 650 mg, 650 mg, oral, q4h PRN, 650 mg at 05/08/24 0553 **OR** acetaminophen (Tylenol) suppository 650 mg, 650 mg, rectal, q4h PRN, Bill Lemons PA-C    acetylcysteine (Mucomyst) 200 mg/mL (20 %) nebulizer solution 600 mg, 3 mL, nebulization, 4x daily, Bill Lemons PA-C, 600 mg at  05/08/24 0717    [Held by provider] amLODIPine (Norvasc) tablet 10 mg, 10 mg, oral, Daily, Bill E Hipps, PA-C, 10 mg at 05/07/24 0952    aspirin chewable tablet 81 mg, 81 mg, oral, Once, Bill E Hipps, PA-C    atorvastatin (Lipitor) tablet 20 mg, 20 mg, oral, Nightly, Bill E Hipps, PA-C, 20 mg at 05/07/24 2015    brimonidine (AlphaGAN) 0.2 % ophthalmic solution 1 drop, 1 drop, Right Eye, BID, Bill E Hipps, PA-C, 1 drop at 05/07/24 2111    dextrose 50 % injection 12.5 g, 12.5 g, intravenous, q15 min PRN, Bill E Hipps, PA-C    dextrose 50 % injection 25 g, 25 g, intravenous, q15 min PRN, Bill E Hipps, PA-C    enoxaparin (Lovenox) syringe 40 mg, 40 mg, subcutaneous, Daily, Bill E Hipps, PA-C, 40 mg at 05/07/24 0952    fentaNYL (Duragesic) 12 mcg/hr patch 1 patch, 1 patch, transdermal, q72h, Bill E Hipps, PA-C, 1 patch at 05/07/24 1425    fentaNYL (Duragesic) 50 mcg/hr patch 1 patch, 1 patch, transdermal, q72h, Bill E Hipps, PA-C, 1 patch at 05/07/24 1425    [Held by provider] furosemide (Lasix) injection 40 mg, 40 mg, intravenous, Daily, Bill E Hipps, PA-C, 40 mg at 05/07/24 0952    [Held by provider] gabapentin (Neurontin) solution 250 mg, 250 mg, oral, BID, Bill E Hipps, PA-C, 250 mg at 05/07/24 1643    [Held by provider] gabapentin (Neurontin) solution 500 mg, 500 mg, oral, Nightly, Bill E Hipps, PA-C, 500 mg at 05/07/24 2124    glucagon (Glucagen) injection 1 mg, 1 mg, intramuscular, q15 min PRN, Bill E Hipps, PA-C    glucagon (Glucagen) injection 1 mg, 1 mg, intramuscular, q15 min PRN, Bill E Hipps, PA-C    hydrOXYzine HCL (Atarax) tablet 25 mg, 25 mg, j-tube, q6h PRN, Bill E Hipps, PA-C, 25 mg at 05/08/24 0554    insulin lispro (HumaLOG) injection 0-5 Units, 0-5 Units, subcutaneous, q4h, Bill Gallos, PA-C    ipratropium-albuteroL (Duo-Neb) 0.5-2.5 mg/3 mL nebulizer solution 3 mL, 3 mL, nebulization, q4h while awake, Bill E Hipps, PA-C, 3 mL at 05/08/24 0717    levETIRAcetam (Keppra) 100 mg/mL solution 750 mg, 750  "mg, g-tube, BID, Bill E Hipps, PA-C, 750 mg at 05/07/24 2111    [Held by provider] lisinopril tablet 20 mg, 20 mg, oral, Daily, Bill E Hipps, PA-C, 20 mg at 05/07/24 0952    methocarbamol (Robaxin) tablet 500 mg, 500 mg, g-tube, q8h PRN, Bill E Hipps, PA-C    ondansetron (Zofran) tablet 8 mg, 8 mg, g-tube, q8h PRN, Bill E Hipps, PA-C    oxygen (O2) therapy, , inhalation, Continuous - Inhalation, Bill E Hipps, PA-C, 70 percent at 05/08/24 0800    oxygen (O2) therapy, , inhalation, Continuous - Inhalation, Bill E Hipps, PA-C, 40 percent at 05/08/24 0800    pantoprazole (ProtoNix) EC tablet 40 mg, 40 mg, oral, Daily, Bill E Hipps, PA-C    piperacillin-tazobactam-dextrose (Zosyn) IV 3.375 g, 3.375 g, intravenous, q6h, Bill E Hipps, PA-C, Stopped at 05/08/24 0451    polyethylene glycol (Glycolax, Miralax) packet 17 g, 17 g, oral, Daily, Bill E Hipps, PA-C    sennosides (Senokot) tablet 17.2 mg, 2 tablet, oral, BID, Bill E Hipps, PA-C, 17.2 mg at 05/07/24 2015    thiamine (Vitamin B-1) tablet 100 mg, 100 mg, oral, Daily, Bill E Hipps, PA-C, 100 mg at 05/07/24 0952    vancomycin (Vancocin) pharmacy to dose - pharmacy monitoring, , miscellaneous, Daily PRN, Bill E Hipps, PA-C    vancomycin (Xellia) 1 g in 200 mL (Xellia) IVPB 1 g, 1 g, intravenous, q24h, Bill E Hipps, PA-C, Stopped at 05/08/24 0156    Review of Systems:  14 point review of systems was completed and negative except for those specially mention in my HPI    Physical Exam:    Heart Rate:  []   Temp:  [36.1 °C (97 °F)-37.6 °C (99.7 °F)]   Resp:  [17-37]   BP: ()/(59-77)   Height:  [167.6 cm (5' 5.98\")]   Weight:  [49.1 kg (108 lb 3.9 oz)-49.7 kg (109 lb 9.1 oz)]   SpO2:  [92 %-100 %]     Physical Exam  HENT:      Head: Normocephalic and atraumatic.      Mouth/Throat:      Mouth: Mucous membranes are dry.      Pharynx: No oropharyngeal exudate.   Eyes:      Extraocular Movements: Extraocular movements intact.      Conjunctiva/sclera: Conjunctivae " normal.      Pupils: Pupils are equal, round, and reactive to light.   Cardiovascular:      Rate and Rhythm: Regular rhythm. Tachycardia present.      Pulses: Normal pulses.      Heart sounds: Normal heart sounds. No murmur heard.  Pulmonary:      Effort: Tachypnea and respiratory distress present.      Breath sounds: Examination of the right-lower field reveals decreased breath sounds. Examination of the left-lower field reveals decreased breath sounds. Decreased breath sounds and wheezing present.   Abdominal:      General: There is no distension.      Palpations: Abdomen is soft.      Tenderness: There is no abdominal tenderness.   Musculoskeletal:      Cervical back: Neck supple. No tenderness.      Right lower leg: No edema.      Left lower leg: No edema.   Skin:     General: Skin is warm.      Capillary Refill: Capillary refill takes less than 2 seconds.   Neurological:      General: No focal deficit present.      Mental Status: She is alert and oriented to person, place, and time.         Objective:    I have reviewed all medications, laboratory results, and imaging pertinent for today's encounter.    FiO2 (%):  [40 %-70 %] 70 %      Intake/Output Summary (Last 24 hours) at 5/8/2024 0908  Last data filed at 5/8/2024 0451  Gross per 24 hour   Intake 650 ml   Output 1350 ml   Net -700 ml         Assessment/Plan:    I am currently managing this critically ill patient for the following problems:    Neuro/Psych/Pain Ctrl/Sedation:  Cancer with mets to the brain s/p gamma knife   Anxiety disorder   - Continue neuro checks per protocol   - Pain control: continue home fentanyl patches, gabapentin, prn tylenol, prn robaxin    - Continue home Keppra   - Continue thiamine   - Hydroxyzine prn for anxiety   - CAM ICU score q-shift, sleep/wake cycle hygiene, delirium precaution     Respiratory/ENT:  Acute hypoxic respiratory failure 2/2 pleural effusion, lung cancer, possible pneumonia   Metastatic lung cancer   Hx of  COPD, baseline O2 2L NC  - CPAP HS  - HFNC during the day, wean as tolerated to maintain SpO2 >90%  - Continuous pulse ox monitoring   - Mucomyst and duonebs 4x daily   - Pulmonary on consult  - Start solumedrol q8hrs   - IR consulted for thoracentesis   - Aggressive pulmonary/bronchial hygiene      Cardiovascular:  Hx of HTN  Hx of hyperlipidemia   - Continue home ASA and atorvastatin   - Low-dose levophed, titrate to SBP >90  - Hold home lisnopril, amlodipine, and lasix as pt has borderline BP   - Continuous cardiac monitoring   - Goal MAP >65     GI:  Metastatic esophageal cancer s/p stenting   Hx of severe protein-calorie malnutrition   Hx of GERD   - Diet: NPO, continue home tube feeds   - Bowel regimen: senokot and miralax   - GI prophylaxis: continue home PPI  - PRN zofran for nausea   - Dietary consult      Renal/Volume Status (Intra & Extravascular):  UTI  Hx of CKD stage III  - UA positive for 4+ bacteria and leuk esterase  - Urine culture pending, abx per below   - Strict I&O monitoring   - Goal urine output 0.5-1.0 cc/kg/her  - Daily BMP, mag, phos  - Replace electrolytes per unit protocol     Endocrine  - POCT glucose q4h  - SSI insulin if indicated   - Monitor for hypo/hyperglycemia      Infectious Disease:  Concern of sepsis w/ unspecified source (possible pneumonia, UTI, peg tube)  - Continue vanc and zosyn   - MRSA pcr: negative   - Urine legionella and streptococcal antigens: negative   - Urine culture: pending   - Blood cultures: no growth @ 1 day   - Wound culture, discharge around peg tube   - Monitor WBC and temperature   - ID on consult      Heme/Onc:  Metastatic Cancer   - Palliative consult for goals of care   - Daily CBC monitoring   - Transfuse for Hgb <7     Derm/MSK:  - ICU skin protocol   - Padded pressure points   - PT/OT eval and treat     Ethics/Code Status:  DNR, CCA- DNI - palliative care following for communication/GOC, will discuss with son and pt over next few days to see if  transfer to hospice house is reasonable based on pts wishes.     :  DVT Prophylaxis: lovenox  GI Prophylaxis: PPI   Bowel Regimen: senna, miralax  Diet: NPO with enteral feeding thru peg  CVC: none  Aida: none  Zamarripa: none  Restraints: none  Dispo: ICU    Critical Care Time:  55 minutes spent in preparing to see patient (I.e.labs,imaging, etc.), documentation, discussion plan of care with patient/family/caregiver, and/ or coordination of care with multidisciplinary team including the attending. Time does not include completion of procedure time.     Plan discussed with Dr. Goddard.     Andrew Agosto PA-C  Pulmonology & Critical Care Medicine   [facility]

## 2024-05-08 NOTE — SIGNIFICANT EVENT
Asked to assess the person given worsening tachypnea and tachycardia.  In brief review, this is a very complicated patient with esophageal cancer with stenting, metastasis to lung and to brain treated with gamma knife last month at [facility].  She has been on high flow the entire day.  I reviewed palliative, infectious disease, primary, and pulmonary notes.  Also personally reviewed the CT showing left lung collapse and likely metastases to the right..  She is currently full code.  ABG shows hypoxemia PaO2 64 despite her working to breathe.  She is blowing off some CO2 believe there may be some anxiety component but I think the hypoxemia is a major driving factor here.  I have been in conversation with the ICU team at night and I will be transferring the patient.  I have alerted the primary service via messaging.  I am available until 7 AM if needed.

## 2024-05-08 NOTE — PROGRESS NOTES
Vancomycin Dosing by Pharmacy - FOLLOW-UP    Debbi Segura is a 63 y.o. year old female who Pharmacy has been consulted for vancomycin dosing for Vancomycin Indications: Pneumonia. Based on the patient's indication and renal status this patient will be dosed based on a goal AUC of 400-600.     Renal function is currently stable.    Current vancomycin dose:  1000 mg every 24 hours    Estimated vancomycin AUC on current dose: 563 mg/L.hr     Visit Vitals  BP 80/60   Pulse 106   Temp 36.1 °C (97 °F) (Temporal)   Resp 17           Lab Results   Component Value Date    CREATININE 1.00 05/08/2024    CREATININE 0.90 05/07/2024    CREATININE 1.28 (H) 11/10/2022    CREATININE 1.21 (H) 08/04/2022    CREATININE 1.17 (H) 08/02/2022    CREATININE 1.19 (H) 04/28/2022       I/O last 3 completed shifts:  In: 1950 (39.5 mL/kg) [Blood:250; IV Piggyback:1700]  Out: 1350 (27.3 mL/kg) [Urine:1350 (0.8 mL/kg/hr)]  Weight: 49.4 kg       Assessment/Plan     Within goal AUC range. Continue current vancomycin regimen.    This dosing regimen is predicted by [employer] to result in the following pharmacokinetic parameters:  Loading dose: N/A  Regimen: 1000 mg IV every 24 hours.  Start time: 00:56 on 05/09/2024  Exposure target: AUC24 (range)400-600 mg/L.hr   AUC24,ss: 563 mg/L.hr  Probability of AUC24 > 400: 96 %  Ctrough,ss: 15.7 mg/L  Probability of Ctrough,ss > 20: 21 %  Probability of nephrotoxicity (Lodise TROY 2009): 11 %    The next level will be obtained on 5/15 at 0500. May be obtained sooner if clinically indicated.   Will continue to monitor renal function daily while on vancomycin and order serum creatinine at least every 48 hours if not already ordered.  Follow for continued vancomycin needs, clinical response, and signs/symptoms of toxicity.     Jose Raul Villar, PharmD

## 2024-05-08 NOTE — PROGRESS NOTES
05/08/24 1555   Physical Activity   On average, how many days per week do you engage in moderate to strenuous exercise (like a brisk walk)? 0 days   On average, how many minutes do you engage in exercise at this level? 0 min   Financial Resource Strain   How hard is it for you to pay for the very basics like food, housing, medical care, and heating? Not hard   Housing Stability   In the last 12 months, was there a time when you were not able to pay the mortgage or rent on time? N   In the last 12 months, how many places have you lived? 2   In the last 12 months, was there a time when you did not have a steady place to sleep or slept in a shelter (including now)? N   Transportation Needs   In the past 12 months, has lack of transportation kept you from medical appointments or from getting medications? no   In the past 12 months, has lack of transportation kept you from meetings, work, or from getting things needed for daily living? No   Food Insecurity   Within the past 12 months, you worried that your food would run out before you got the money to buy more. Never true   Within the past 12 months, the food you bought just didn't last and you didn't have money to get more. Never true   Stress   Do you feel stress - tense, restless, nervous, or anxious, or unable to sleep at night because your mind is troubled all the time - these days? Not at all   Social Connections   In a typical week, how many times do you talk on the phone with family, friends, or neighbors? More than 3   How often do you get together with friends or relatives? More than 3   How often do you attend Druze or Yazdanism services? Never   Do you belong to any clubs or organizations such as Druze groups, unions, fraternal or athletic groups, or school groups? No   How often do you attend meetings of the clubs or organizations you belong to? Never   Intimate Partner Violence   Within the last year, have you been afraid of your partner or  ex-partner? No   Within the last year, have you been humiliated or emotionally abused in other ways by your partner or ex-partner? No   Within the last year, have you been kicked, hit, slapped, or otherwise physically hurt by your partner or ex-partner? No   Within the last year, have you been raped or forced to have any kind of sexual activity by your partner or ex-partner? No   Alcohol Use   Q1: How often do you have a drink containing alcohol? Never   Q2: How many drinks containing alcohol do you have on a typical day when you are drinking? None   Q3: How often do you have six or more drinks on one occasion? Never   Utilities   In the past 12 months has the electric, gas, oil, or water company threatened to shut off services in your home? No   Health Literacy   How often do you need to have someone help you when you read instructions, pamphlets, or other written material from your doctor or pharmacy? Sometimes

## 2024-05-08 NOTE — PROGRESS NOTES
"Debbi Segura is a 63 y.o. female on day 1 of admission presenting with Sepsis, due to unspecified organism, unspecified whether acute organ dysfunction present (Multi).    Subjective   Interval History:   Afebrile, no chills  On pressors  Interval transfer to ICU  Remains on high flow oxygen  Nonproductive cough  No chest pain  No nausea vomiting or diarrhea    Review of Systems   All other systems reviewed and are negative.      Objective   Range of Vitals (last 24 hours)  Heart Rate:  []   Temp:  [36.1 °C (97 °F)-37.6 °C (99.7 °F)]   Resp:  [21-37]   BP: ()/(59-77)   Height:  [167.6 cm (5' 5.98\")]   Weight:  [49.1 kg (108 lb 3.9 oz)-49.7 kg (109 lb 9.1 oz)]   SpO2:  [92 %-100 %]   Daily Weight  05/08/24 : 49.1 kg (108 lb 3.9 oz)    Body mass index is 17.48 kg/m².    Physical Exam  Constitutional:       General: She is awake.      Appearance: She is ill-appearing.   HENT:      Head: Normocephalic and atraumatic.      Right Ear: External ear normal.      Left Ear: External ear normal.      Nose: Nose normal.   Eyes:      General: No scleral icterus.     Extraocular Movements: Extraocular movements intact.   Cardiovascular:      Rate and Rhythm: Tachycardia present.      Heart sounds: Normal heart sounds, S1 normal and S2 normal.   Pulmonary:      Breath sounds: Decreased breath sounds present.   Abdominal:      General: Bowel sounds are normal.      Palpations: Abdomen is soft.   Musculoskeletal:      Cervical back: Normal range of motion and neck supple.      Right lower leg: No edema.      Left lower leg: No edema.   Skin:     General: Skin is warm and dry.   Neurological:      Mental Status: She is alert.   Psychiatric:         Behavior: Behavior normal. Behavior is cooperative.     Antibiotics  aspirin chewable tablet 324 mg  famotidine PF (Pepcid) injection 20 mg  oxygen (O2) therapy  sodium chloride 0.9 % bolus 1,698 mL  vancomycin 1.5 g in 300 mL (Xellia) IVPB 1.5 g  furosemide (Lasix) injection " 40 mg  iohexol (OMNIPaque) 350 mg iodine/mL solution 75 mL  furosemide (Lasix) injection 40 mg  aspirin chewable tablet 81 mg  acetaminophen (Tylenol) tablet 650 mg  acetaminophen (Tylenol) oral liquid 650 mg  acetaminophen (Tylenol) suppository 650 mg  enoxaparin (Lovenox) syringe 40 mg  polyethylene glycol (Glycolax, Miralax) packet 17 g  amLODIPine (Norvasc) tablet 10 mg  atorvastatin (Lipitor) tablet 20 mg  brimonidine (AlphaGAN) 0.2 % ophthalmic solution 1 drop  lisinopril tablet 20 mg  thiamine (Vitamin B-1) tablet 100 mg  piperacillin-tazobactam-dextrose (Zosyn) IV 3.375 g      methocarbamol (Robaxin) tablet  ondansetron (Zofran) tablet    pantoprazole (ProtoNix) EC tablet  hydrOXYzine (Atarax) tablet  levETIRAcetam (Keppra) 100 mg/mL solution 750 mg  hydrOXYzine HCL (Atarax) tablet 25 mg  methocarbamol (Robaxin) tablet 500 mg  ondansetron (Zofran) tablet 8 mg  sennosides (Senokot) tablet 17.2 mg  pantoprazole (ProtoNix) EC tablet 40 mg  ipratropium-albuteroL (Duo-Neb) 0.5-2.5 mg/3 mL nebulizer solution 3 mL  acetylcysteine (Mucomyst) 200 mg/mL (20 %) nebulizer solution 600 mg  fentaNYL (Duragesic) 50 mcg/hr patch 1 patch  fentaNYL (Duragesic) 12 mcg/hr patch 1 patch  gabapentin (Neurontin) solution 250 mg  gabapentin (Neurontin) solution 500 mg  methocarbamol (Robaxin) tablet 500 mg  vancomycin (Vancocin) pharmacy to dose - pharmacy monitoring  vancomycin (Xellia) 1 g in 200 mL (Xellia) IVPB 1 g  enoxaparin (Lovenox) syringe 40 mg  oxygen (O2) therapy  albumin human 5 % infusion 12.5 g  glucagon (Glucagen) injection 1 mg  dextrose 50 % injection 25 g  glucagon (Glucagen) injection 1 mg  dextrose 50 % injection 12.5 g  insulin lispro (HumaLOG) injection 0-5 Units  potassium chloride (Klor-Con) packet 20 mEq      Relevant Results  Labs  Results from last 72 hours   Lab Units 05/08/24  0427 05/07/24  0222   WBC AUTO x10*3/uL 6.5 7.9   HEMOGLOBIN g/dL 9.1* 10.0*   HEMATOCRIT % 30.0* 32.5*   PLATELETS AUTO  "x10*3/uL 206 207   NEUTROS PCT AUTO %  --  77.9   LYMPHS PCT AUTO %  --  8.1   MONOS PCT AUTO %  --  9.5   EOS PCT AUTO %  --  3.8     Results from last 72 hours   Lab Units 05/08/24  0427 05/07/24  0222   SODIUM mmol/L 140 136   POTASSIUM mmol/L 3.7 4.2   CHLORIDE mmol/L 103 100   CO2 mmol/L 23* 24   BUN mg/dL 28* 33*   CREATININE mg/dL 1.00 0.90   GLUCOSE mg/dL 95 96   CALCIUM mg/dL 9.7 9.7   ANION GAP mmol/L 14 12   EGFR mL/min/1.73m*2 63 72   PHOSPHORUS mg/dL 3.4  --      Results from last 72 hours   Lab Units 05/07/24 0222   ALK PHOS U/L 90   BILIRUBIN TOTAL mg/dL 0.3   PROTEIN TOTAL g/dL 6.8   ALT U/L 9   AST U/L 14   ALBUMIN g/dL 3.0*     Estimated Creatinine Clearance: 44.1 mL/min (by C-G formula based on SCr of 1 mg/dL).  No results found for: \"CRP\"  Microbiology  Reviewed  Imaging  XR chest 1 view    Result Date: 5/8/2024  Interpreted By:  Russell Escalante, STUDY: XR CHEST 1 VIEW;  5/7/2024 10:18 pm   INDICATION: Signs/Symptoms:hypoxemia.   COMPARISON: CXR 05/07/2024, CT chest 05/07/2024   ACCESSION NUMBER(S): [id number]   ORDERING CLINICIAN: SERA SMITH   FINDINGS: There is worsening consolidation within the right upper lobe, right middle lobe. Similar consolidation in the right lower lobe. There is redemonstration of a prominent left basilar opacity representing sub pulmonic effusion. There is redemonstration of complete left upper lobe collapse and left hilar mass representing locally invasive esophageal cancer and malignant lymphadenopathy. No pneumothorax.       Please see above.     MACRO: None.   Signed by: Russell Escalante 5/8/2024 1:28 AM Dictation workstation:   [id number]    CT chest w IV contrast    Result Date: 5/7/2024  Interpreted By:  Chaitanya Lovell, STUDY: CT CHEST W IV CONTRAST;  5/7/2024 4:25 am   INDICATION: Signs/Symptoms:pleural effusion.   COMPARISON: 05/13/2022   ACCESSION NUMBER(S): [id number]   ORDERING CLINICIAN: JENN NEWMAN   TECHNIQUE: Helical data " acquisition of the chest was obtained without intravenous contrast. Images were reformatted in axial, coronal, and sagittal planes.   FINDINGS: LOWER NECK AND CHEST WALL:  Right chest port catheter.   MEDIASTINUM/REGINE:No lymphadenopathy. Metallic esophageal stent in place. Trace debris within the stent lumen. Confluent soft tissue density within the posterior paratracheal and paraesophageal mediastinum may reflect confluence of adenopathy or locally advanced malignancy.   CARDIOVASCULAR:  Cardiac chamber size within normal limits. Small pericardial effusion. Right chest port catheter tip terminating at the SVC/RA junction. Aortic caliber normal. Normal caliber of main pulmonary artery. Scattered coronary atherosclerotic calcification.   LUNGS, AIRWAYS, AND PLEURA:  Severe focal narrowing of the left mainstem bronchus. There is complete occlusion/filling of the majority of the left lower lobar and left lower lobe segmental bronchi. Moderate bilateral pleural effusions. Moderate emphysema. Patchy ground-glass opacities throughout the right lung and dependent right lower lobe consolidation may reflect an infectious/inflammatory process. Somewhat nodular interlobular septal thickening at the medial right lung base may reflect lymphangitic carcinomatosis. There are spiculated pulmonary nodules within the left upper lobe measuring 2.1 cm, right upper lobe measuring 1.3 cm, and posteroinferior right lower lobe measuring 1.5 cm as well as the superior segment of the right lower lobe measuring 1.1 cm. There is complete collapse of the left upper lobe and complete filling of the left upper lobar bronchi.   MUSCULOSKELETAL: No acute osseous abnormality or suspicious osseous lesions. Mild multilevel spinal degenerative change.   UPPER ABDOMEN: Unremarkable.       1. Esophageal stent in place with soft tissue attenuation material in the posterior mediastinum surrounding the esophagus and central airways, likely reflecting known  locally advanced esophageal squamous cell carcinoma with possible additional superimposed confluent adenopathy. There is complete narrowing/filling of the left upper lobe airway with collapse/consolidation of the left upper lobe. There is partial filling/narrowing of the left lower lobe airway with extensive material filling the central bronchi of the left lower lobe. Extensive patchy ground-glass/consolidative opacities within the right lung concerning for an infectious/inflammatory process. 2. Moderate bilateral pleural effusions. 3. Spiculated bilateral pulmonary nodules, likely metastatic. 4. Small pericardial effusion. 5. Somewhat nodular interlobular septal thickening at the medial right lung base may reflect lymphangitic carcinomatosis.   Signed by: Chaitanya Lovell 5/7/2024 5:02 AM Dictation workstation:   [id number]    XR chest 1 view    Result Date: 5/7/2024  Interpreted By:  Russell Escalante, STUDY: XR CHEST 1 VIEW;  5/7/2024 2:29 am   INDICATION: Signs/Symptoms:Chest Pain.   COMPARISON: 04/06/2017   ACCESSION NUMBER(S): [id number]   ORDERING CLINICIAN: JENN NEWMAN   FINDINGS: There is an esophageal stent. There is a right chest port terminating over the cavoatrial junction.   There is a large left pleural effusion. There is new bilateral hilar enlargement and nodularity likely representing lymphadenopathy. There is left hemithorax volume loss. There is airspace disease at the left lung base. There is diffuse peribronchovascular and interstitial prominence. No pneumothorax.       Multiple cardiopulmonary abnormalities including probable pulmonary edema, large left pleural effusion, bilateral hilar and mediastinal lymphadenopathy and probable airway obstruction on the left resulting in volume loss of the left hemithorax. CT chest with contrast is recommended for further evaluation.   Esophageal stent consistent with esophageal cancer.   MACRO: None.   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 M: CTBWO_3 CT Dose-Length Product (DLP): 1208  mGy*cm CT Dose Reduction Employed: Automated exposure control (AEC) COMPARISON:  CT brain 04/13/2024.  MRI brain 04/12/2024. RESULT: Acute change:  No evidence of an acute intracranial process. Hemorrhage:  No evidence of acute intracranial hemorrhage. Mass Lesion / Mass Effect: Again noted is the low attenuating mass lesion in the left medial parietal lobe with confluent surrounding vasogenic edema, intracranial metastasis, better delineated on the prior MR brain 04/12/2024.  Otherwise, no midline shift or herniation. Chronic change:  Patchy nonspecific supratentorial white matter changes likely reflecting chronic microvascular ischemia. Parenchyma:  Mild generalized parenchymal volume loss. Ventricles:  Commensurate with volume loss. Other:  The calvarium, skull base, imaged paranasal sinuses, mastoids, orbits and extracranial soft tissues are unremarkable.  Stereotactic frame is in place.  (topogram) images:  No additional findings.    IMPRESSION: Localization exam.  No acute intracranial process. : PSCJOAN   Transcribe Date/Time: Apr 16 2024 11:46A Dictated by : KAREY AVELAR DO This examination was interpreted and the report reviewed and electronically signed by: KAREY AVELAR DO on Apr 16 2024 11:49AM  EST    CT BRAIN STEREOLOCAL WO IVCON    Result Date: 4/13/2024  * * *Final Report* * * DATE OF EXAM: Apr 13 2024  2:31PM   [facility] [place]   0503  -  CT BRAIN STEREOLOCAL WO IVCON  / ACCESSION #  [id number] PROCEDURE REASON: Other  (document in comments)      * * * * Physician Interpretation * * * *  EXAMINATION:  CT STEREOLOCALIZATION BRAIN WITH CONTRAST HISTORY:  63 year old female with left parietal mass, preoperative stereo localization exam. TECHNIQUE: CT head high-resolution stereotactic localization without contrast. M: CTBWO_3 CT Dose-Length Product (DLP): 1336  mGy*cm CT Dose Reduction Employed: Automated exposure control (AEC) COMPARISON:  MR brain with and without contrast 04/12/2024. RESULT: The patient's known left parietal mass lesion is better demonstrated on the 04/12/2024 MRI with and without contrast.  There is surrounding vasogenic edema.  Mild local mass effect with partial effacement of the left occipital horn and atrium of the left lateral ventricle. No acute infarct or hemorrhage.  No midline shift or abnormal extra-axial fluid collection.  Scattered white matter hypoattenuation, nonspecific, however likely representing sequela of prior chronic microvascular ischemia.  Mild to moderate generalized parenchymal volume loss with commensurate prominence of the cortical sulci and ventricles.  Partial left lateral ventricular effacement, as above.  No hydrocephalus or ventricular trapping. Paranasal sinuses are clear.  Mastoid air cells and middle ear cavities are clear bilaterally.  Bilateral orbits are within normal limits.   Overlying soft tissues demonstrate no focal abnormality.  No destructive calvarial lesion.  Patient is edentulous.    IMPRESSION: Stereotactic localization examination. Known left parietal lesion is better demonstrated on the 04/12/2024 MRI with and without contrast. No acute hemorrhage or large territorial infarct. : VEENA   Transcribe Date/Time: Apr 13 2024  2:33P Dictated by : RAJ DELGADO MD This examination was interpreted and the report reviewed and electronically signed by: ROBERT HAMPTON MD on Apr 13 2024  2:50PM  EST    FL enema single contrast water soluble    Result Date:  4/12/2024  * * *Final Report* * * DATE OF EXAM: Apr 12 2024  2:28PM   [facility]   [id number]  -  XR COLON SINGLE CONTRAST  / ACCESSION #  [id number] PROCEDURE REASON: Assess for fistula      * * * * Physician Interpretation * * * *  WATER SOLUBLE CONTRAST ENEMA HISTORY: Assess for rectovaginal fistula. COMPARISON: CT abdomen and pelvis 02/27/2024 TECHNIQUE: Water soluble-contrast enema was performed after insertion of a rectal tube.  Spot and overhead images were obtained. Contrast: RECTAL:  400 ml of OMNIPAQUE 300 Fluoroscopy radiation summary: Fluoroscopy time: 1:30 (min:sec). Air kerma: 80.5 mGy. RESULT: : No dilated loops of bowel.  Bilateral pelvic tubal ligation clips.  Calcified uterine fibroids. Contrast refluxes to the level of the mid sigmoid colon.  There is no communication with the vagina.  Filling defects in the opacified colon consistent with feces. Examination ended due to patient discomfort and difficulty retaining the contrast material. The study was performed by CHANCE Sal, under the supervision of Dr. Kaiser, who was present for the critical portion of the exam.   Images associated with this study were submitted for interpretation and reviewed by Dr. Kaiser. ===========    IMPRESSION: NO DEMONSTRATED RECTOVAGINAL FISTULA. : PSCB   Transcribe Date/Time: Apr 12 2024  2:38P Dictated by : CHANCE SAL This examination was interpreted and the report reviewed and electronically signed by: MABEL KAISER MD on Apr 12 2024  3:25PM  EST    MR brain w and wo IV contrast    Result Date: 4/12/2024  * * *Final Report* * * DATE OF EXAM: Apr 12 2024  5:39AM   [facility]   0295  -  MRI BRAIN WO/W IVCON  / ACCESSION #  [id number] PROCEDURE REASON: Metastatic disease evaluation      * * * * Physician Interpretation * * * *  EXAMINATION:  MRI BRAIN WO/W IVCON CLINICAL HISTORY: Gamma knife protocol preop localization exam. TECHNIQUE:  Limited Gamma knife protocol coronal T1 pre and post contrast,  axial postcontrast T1, axial T2 sequence. Contrast:  11 mL [facility] IV COMPARISON: MRI of yesterday RESULT: Mass Lesion/ Mass Effect:    Peripherally enhancing cystic and solid right parietal mass measuring approximately 3 x 3 x 3 cm with more heterogenous solid components along its lateral margin.  Moderate surrounding vasogenic edema and mild locoregional mass effect.  No midline shift. No other abnormal enhancing brain lesions. Chronic Change:  Unchanged burden of supra and infratentorial chronic small vessel change. Parenchyma:   No significant volume loss for age. Ventricles:     Normal caliber and morphology. Vasculature:    Major intracranial arterial structures, and dural venous sinuses show typical flow void, suggesting patency by spin echo criteria. Other:  The visualized paranasal sinuses and mastoid air cells are clear.  The orbits and extracranial soft tissues are unremarkable.    IMPRESSION: Unchanged left parietal cystic and solid mass most compatible with metastasis.  No other enhancing brain lesions. : VEENA   Transcribe Date/Time: Apr 12 2024  6:13A Dictated by : ADIN DARBY MD This examination was interpreted and the report reviewed and electronically signed by: ADIN DARBY MD on Apr 12 2024  6:30AM  EST    MR brain w and wo IV contrast    Result Date: 4/11/2024  * * *Final Report* * * DATE OF EXAM: Apr 10 2024 11:48PM   [facility]   0295  -  MRI BRAIN WO/W IVCON  / ACCESSION #  [id number] PROCEDURE REASON: Brain/CNS neoplasm, monitor      * * * * Physician Interpretation * * * *  EXAMINATION:  MRI BRAIN WO IVCON CLINICAL HISTORY: Brain CNS neoplasm. TECHNIQUE:  Routine brain MRI protocol without and with contrast including diffusion images. MQ:  MRBWOW_2 Contrast: None COMPARISON: CT brain 04/09/2024 RESULT: Truncated exam secondary to patient tolerance.  No postcontrast sequences are available. Acute Change:   There is no evidence of restricted diffusion to suggest an acute  infarct.  There is some restricted diffusion around the periphery of the left parietal mass. Hemorrhage:    No evidence of prior parenchymal hemorrhage on the gradient echo images. Mass Lesion/ Mass Effect: 3 cm left parietal mass and moderate surrounding vasogenic edema. Chronic Change:  Scattered punctate foci of increased T2 and FLAIR signal are noted in the supratentorial and infratentorial white matter which is a nonspecific finding, but likely represents mild to moderate chronic microvascular ischemia. Parenchyma:   No significant volume loss for age. Ventricles:     Normal caliber and morphology. Skull Base:    Hypothalamic and pituitary region are grossly normal.   Craniocervical junction is normal. No significant marrow replacement process. Vasculature:    Major intracranial arterial structures, and dural venous sinuses show typical flow void, suggesting patency by spin echo criteria. Other:  No abnormal signal in the sinuses or mastoids.  The orbits and extracranial soft tissues are unremarkable.    IMPRESSION: Left parietal mass and surrounding vasogenic edema are unchanged from the CT of yesterday allowing for differences in modality. Truncated exam secondary to patient tolerance with no postcontrast imaging limiting evaluation for additional metastatic disease. : VEENA   Transcribe Date/Time: Apr 11 2024 12:27A Dictated by : ADIN DARBY MD This examination was interpreted and the report reviewed and electronically signed by: ADIN DARBY MD on Apr 11 2024 12:40AM  EST    XR chest 1 view    Result Date: 4/10/2024  * * *Final Report* * * DATE OF EXAM: Apr 9 2024 11:00PM   [facility]   [id number]  -  XR CHEST 1V FRONTAL PORT  / ACCESSION #  [id number] PROCEDURE REASON: Shortness of breath      * * * * Physician Interpretation * * * *  EXAMINATION:  CHEST RADIOGRAPH (PORTABLE SINGLE VIEW AP) Exam Date/Time:  4/9/2024 11:00 PM Clinical History: Shortness of breath MQ:  XCPMC_6 Comparison:  Earlier the  same day. RESULT: Lines, tubes, and devices:  Stable right-sided Port-A-Cath and esophageal stent.  Lungs and pleura:  No significant change of small left pleural effusion and associated basilar atelectasis.  Stable left parahilar opacity also related to atelectasis..  No pneumothorax.  Known metastatic lung nodules at the seen on prior chest CT.  No pneumothorax. Cardiomediastinal silhouette:  Stable cardiomediastinal silhouette. Other:  .    IMPRESSION: See result. : PSCJOAN   Transcribe Date/Time: Apr 10 2024  7:11A Dictated by : SERENE HELM MD This examination was interpreted and the report reviewed and electronically signed by: SERENE HELM MD on Apr 10 2024  7:16AM  EST    CT angio chest w and wo IV contrast    Result Date: 4/9/2024  * * *Final Report* * * DATE OF EXAM: Apr 9 2024 10:53AM   [facility]   [id number]  -  CT CHEST W IVCON PE  / ACCESSION #  [id number] PROCEDURE REASON: Pulmonary embolism (PE) suspected, high prob      * * * * Physician Interpretation * * * * RESULT: EXAMINATION:  CHEST CT WITH CONTRAST (PULMONARY EMBOLISM PROTOCOL) CLINICAL HISTORY: Pulmonary embolus suspected Technique:  Spiral CT acquisition of the chest from the thoracic inlet to the upper abdomen following IV contrast.  Axial 1 and 3 mm thick slices plus coronal and sagittal reformatted images. MQ:  CTCP_5 Contrast: mL Omnipaque 300 IV CT Radiation dose: Integrated Dose-length product (DLP) for this visit = mGy*cm CT Dose Reduction Employed: Automated exposure control(AEC) and iterative recon Comparison:  CT chest 2/27/2024 RESULT: Limitations:  Some respiratory motion. Evaluation for thromboembolic disease:      - Right heart chambers:  No thromboembolic disease.      - Main pulmonary arteries:  No thromboembolic disease.      - Lobar pulmonary arteries:  No thromboembolic disease.      - Segmental pulmonary arteries:  Limited due to motion in some areas.      - Subsegmental pulmonary arteries:  Limited due to motion  in some areas      - Additional pulmonary artery findings:  The main pulmonary artery is normal in caliber. Lines, tubes, and devices:  Esophageal stent is situated in the mid/lower esophagus. Lung parenchyma and airways: Increased narrowing of the left main bronchus with occlusion of the left upper lobe bronchus and marked narrowing of the left lower lobe bronchus which is increased compared to prior. New lobar atelectasis of the left upper lobe/lingula. Numerous bilateral pulmonary metastases, the largest of which measures up to 2.2 cm are unchanged. Unable to assess the metastases in the atelectatic left upper lobe/lingula. Paramediastinal lung changes, presumably due to prior radiation therapy, similar to prior. There is again pulmonary emphysema. Pleural space:  Small left pleural effusion. Lower neck, lymph nodes, and mediastinum:  There is again esophageal stent. There is again soft tissue thickening along the left side of the mediastinum (series 8 image 97), which extends to the hilar region and causes worsened narrowing of the left main bronchus/left lower lobe bronchus and occlusion of the left upper lobe bronchus. The amount of soft tissue appears more prominent compared to prior but is difficult to measure. Heart, pericardium, and thoracic vessels:  The heart is not enlarged.   There is a small pericardial effusion.  No thoracic aortic aneurysm.   Atheromatous calcification of the aorta. Bones and soft tissues:  Degenerative changes of the spine. Upper abdomen:  Water density lesion posterior right hepatic lobe. Localizer images: No additional findings.    IMPRESSION: Evaluation of some of the small peripheral pulmonary arteries is limited by respiratory motion. No definite CT evidence of pulmonary embolus otherwise. Increased amount of mediastinal soft tissue which encases the left-sided bronchi with occlusion of the left upper lobe bronchus and increased narrowing of the left main and left lower lobe  bronchi. New complete atelectasis of the left upper lobe. The visualized bilateral pulmonary metastases are grossly unchanged. Small left pleural effusion. Small pericardial effusion. Transcribed Using Voice Recognition Transcribe Date/Time: Apr 9 2024 11:41A Dictated by: JO CORDOVA MD This examination was interpreted and the report reviewed and electronically signed by: JO CORDOVA MD on Apr 9 2024 12:07PM  EST    CT head wo IV contrast    Result Date: 4/9/2024  * * *Final Report* * * DATE OF EXAM: Apr 9 2024 10:52AM   [facility]   0504  -  CT BRAIN WO IVCON  / ACCESSION #  [id number] PROCEDURE REASON: Seizure, focal (Ped 0-18y)      * * * * Physician Interpretation * * * * RESULT: EXAMINATION: CT BRAIN WO IVCON CLINICAL HISTORY: Altered mental status, seizure TECHNIQUE:  Serial axial images without IV contrast were obtained from the vertex to the foramen magnum. MQ:  CTBWO_3 CT Radiation dose: Integrated Dose-Length Product (DLP) for this visit =   1187  mGy*cm CT Dose Reduction Employed: Automated exposure control(AEC) and iterative recon COMPARISON: Head CT 10/27/2023 RESULT: Post-operative change: None. Acute change: No evidence of an acute infarct or other acute parenchymal process. Hemorrhage: No evidence of acute intracranial hemorrhage. ECASS hemorrhagic transformation score: Not Applicable Mass Lesion / Mass Effect: There is a new 3 cm left parietal mass with localized sulcal effacement and surrounding vasogenic edema. Chronic change: Scattered patchy foci of low attenuation are present within supratentorial white matter which is a nonspecific finding but likely represents mild microvascular ischemia. Parenchyma: There is mild generalized volume loss.  The brain parenchyma is otherwise within normal limits for age. Ventricles: Ventricular enlargement concordant with the degree of parenchymal volume loss. Paranasal sinuses and skull base: The visualized paranasal sinuses are grossly clear.   The skull base  and imaged soft tissues are unremarkable. Localizer images: No additional findings.    IMPRESSION: Left parietal mass new from 10/27/2023, with surrounding vasogenic edema and localized mass effect. COMMUNICATION:  Communicated with DR ALFONZO ELIZABETH on 4/9/2024 11:12 AM  via verbal communication. Transcribed Using Voice Recognition Transcribe Date/Time: Apr 9 2024 11:05A Dictated by: NYDIA MADRIGAL MD This examination was interpreted and the report reviewed and electronically signed by: NYDIA MADRIGAL MD on Apr 9 2024 11:16AM  EST    XR chest 1 view    Result Date: 4/9/2024  * * *Final Report* * * DATE OF EXAM: Apr 9 2024  8:13AM   EUX   [id number]  -  XR CHEST 1V FRONTAL PORT  / ACCESSION #  [id number] PROCEDURE REASON: Shortness of breath      * * * * Physician Interpretation * * * * RESULT: EXAMINATION:  CHEST RADIOGRAPH (PORTABLE SINGLE VIEW AP) Exam Date/Time:  4/9/2024 8:13 AM CLINICAL HISTORY: Shortness of breath MQ:  XCPR_5 Comparison:  02/29/2024 RESULT: Lines, tubes, and devices:  Right IJ port, tip terminating at the cavoatrial junction.  The esophageal stent with no significant change in position, superior margin at the level of the clavicles. Lungs and pleura:  Mild left basilar opacities, likely subsegmental atelectasis.  No substantial pleural effusion or pneumothorax. Cardiomediastinal silhouette:  Stable cardiomediastinal silhouette with masslike soft tissue prominence from the level of the aortic arch to the izabela.    IMPRESSION: See result. Transcribed Using Voice Recognition Transcribe Date/Time: Apr 9 2024  8:16A Dictated by: NYDIA MADRIGAL MD This examination was interpreted and the report reviewed and electronically signed by: NYDIA MADRIGAL MD on Apr 9 2024  8:20AM  EST     Assessment/Plan   Shock, possibly septic  Acute hypoxic respiratory failure-differentials include lung collapse, pleural effusion, infection  Abnormal CT chest-pleural effusion, left-sided lung collapse, possible  pneumonia  Gram-negative urinary tract infection  Metastatic esophageal cancer     Continue Zosyn  IV vancomycin  Follow-up pleural fluid workup  Oxygen as needed  Supportive care  Monitor temperature and WBC      Dylon Blackmon MD

## 2024-05-08 NOTE — CARE PLAN
Problem: Respiratory  Goal: Clear secretions with interventions this shift  Outcome: Progressing  Goal: Minimize anxiety/maximize coping throughout shift  Outcome: Progressing  Goal: Minimal/no exertional discomfort or dyspnea this shift  Outcome: Progressing  Goal: No signs of respiratory distress (eg. Use of accessory muscles. Peds grunting)  Outcome: Progressing  Goal: Patent airway maintained this shift  Outcome: Progressing  Goal: Tolerate mechanical ventilation evidenced by VS/agitation level this shift  Outcome: Progressing  Goal: Tolerate pulmonary toileting this shift  Outcome: Progressing  Goal: Verbalize decreased shortness of breath this shift  Outcome: Progressing  Goal: Wean oxygen to maintain O2 saturation per order/standard this shift  Outcome: Progressing  Goal: Increase self care and/or family involvement in next 24 hours  Outcome: Progressing     Problem: Skin  Goal: Decreased wound size/increased tissue granulation at next dressing change  Outcome: Progressing  Flowsheets (Taken 5/8/2024 1214)  Decreased wound size/increased tissue granulation at next dressing change: Protective dressings over bony prominences  Goal: Participates in plan/prevention/treatment measures  Outcome: Progressing  Flowsheets (Taken 5/8/2024 1214)  Participates in plan/prevention/treatment measures: Elevate heels  Goal: Prevent/manage excess moisture  Outcome: Progressing  Flowsheets (Taken 5/8/2024 1214)  Prevent/manage excess moisture:   Moisturize dry skin   Cleanse incontinence/protect with barrier cream  Goal: Prevent/minimize sheer/friction injuries  Outcome: Progressing  Flowsheets (Taken 5/8/2024 1214)  Prevent/minimize sheer/friction injuries:   Use pull sheet   Turn/reposition every 2 hours/use positioning/transfer devices   HOB 30 degrees or less  Goal: Promote/optimize nutrition  Outcome: Progressing  Flowsheets (Taken 5/8/2024 1214)  Promote/optimize nutrition: Monitor/record intake including meals  Goal:  Promote skin healing  Outcome: Progressing  Flowsheets (Taken 5/8/2024 1214)  Promote skin healing:   Turn/reposition every 2 hours/use positioning/transfer devices   Protective dressings over bony prominences     Problem: Fall/Injury  Goal: Not fall by end of shift  Outcome: Progressing  Goal: Be free from injury by end of the shift  Outcome: Progressing  Goal: Verbalize understanding of personal risk factors for fall in the hospital  Outcome: Progressing  Goal: Verbalize understanding of risk factor reduction measures to prevent injury from fall in the home  Outcome: Progressing  Goal: Use assistive devices by end of the shift  Outcome: Progressing  Goal: Pace activities to prevent fatigue by end of the shift  Outcome: Progressing   The patient's goals for the shift include breath better    The clinical goals for the shift include maintain oxygen levels    Over the shift, the patient did not make progress toward the following goals. Barriers to progression include low oxygen levels. Recommendations to address these barriers include monitor labs and sat levels.

## 2024-05-08 NOTE — PROGRESS NOTES
05/08/24 1600   [facility] Disability Status   Are you deaf or do you have serious difficulty hearing? N   Are you blind or do you have serious difficulty seeing, even when wearing glasses? N   Because of a physical, mental, or emotional condition, do you have serious difficulty concentrating, remembering, or making decisions? (5 years old or older) N   Do you have serious difficulty walking or climbing stairs? Y   Do you have serious difficulty dressing or bathing? Y   Because of a physical, mental, or emotional condition, do you have serious difficulty doing errands alone such as visiting the doctor? Y

## 2024-05-08 NOTE — PROGRESS NOTES
Debbi Segura is a 63 y.o. female on day 1 of admission presenting with Sepsis, due to unspecified organism, unspecified whether acute organ dysfunction present (Multi).    Subjective   Symptoms (0 - 10, Best to Worst)  [place] Symptom Assessment System  Pain Score: 8  Today patient is on HFNC 30LPM/50%Fio2. BP running low, starting low dose levophed. Reports anxiety, dyspnea, moist nonproductive cough. Sore throat chronic, stable. R lung pain subacute, progressively worsened. L Lung pain chronic. Rated moderate currently. NO nausea.        Objective     Vitals and nursing note reviewed.   Constitutional:       General: She is not in acute distress.     Appearance: She is ill-appearing.      Comments: Very thin and frail; appears much older than stated age   HENT:      Head: Normocephalic and atraumatic.      Mouth/Throat:      Mouth: Mucous membranes are dry.      Pharynx: Oropharynx is clear.   Eyes:      General: No scleral icterus.     Extraocular Movements: Extraocular movements intact.      Pupils: Pupils are equal, round, and reactive to light.   Neck:      Comments: Cervical rotation decreased  Cardiovascular:      Rate and Rhythm: Regular rhythm. Tachycardia present.      Pulses: Normal pulses.   Pulmonary:      Breath sounds: coarse sounds R lung, moist nonproductive cough     Comments: Respirations are shallow, even, slightly labored; on 30/50%HFNC  Abdominal:      General: Abdomen is flat.      Palpations: Abdomen is soft.      Comments: peg   Musculoskeletal:         General: No deformity.      Right lower leg: No edema.      Left lower leg: No edema.   Skin:     General: Skin is warm and dry.   Neurological:      General: No focal deficit present.      Mental Status: She is alert and oriented to person, place, and time.   Psychiatric:         Thought Content: Thought content normal.         Judgment: Judgment normal.      Comments: anxious        Last Recorded Vitals  Blood pressure 99/75, pulse (!)  "121, temperature 35.8 °C (96.4 °F), resp. rate (!) 27, height 1.676 m (5' 5.98\"), weight 49.1 kg (108 lb 3.9 oz), SpO2 95%.  Intake/Output last 3 Shifts:  I/O last 3 completed shifts:  In: 1950 (39.5 mL/kg) [Blood:250; IV Piggyback:1700]  Out: 1350 (27.3 mL/kg) [Urine:1350 (0.8 mL/kg/hr)]  Weight: 49.4 kg     Relevant Results            Malnutrition Diagnosis Status: New  Malnutrition Diagnosis: Severe malnutrition related to chronic disease or condition  As Evidenced by: Severe subcutaneous fat and muscle wasting  I agree with the dietitian's malnutrition diagnosis.      Assessment/Plan   IMP:    Acute hypoxic respiratory failure - 2/2 large left pleural effusion. Bilat lung nodules noted on chest CT likely metastatic disease. Pulm consulted recommending thoracentesis.   Metastatic esophageal cancer - mets to brain and now lungs. Prognosis poor  Anxiety disorder - chronic, on Vistaril outpatient, continue  Brain metastatic disease on Keppra  Chronic pain - generalized resume home regimen with fentanyl Duragesic patch, nhi, prn tylenol and prn methocarbamol   Palliative Care  FULL CODE  Capable  Son Rio is only child, stated POA but we do not have this documentation. Regardless he is legal surrogate if needed.  Patient and son met with Hospice a few weeks ago when brain mets were identified. She was not ready to shift goals of care and wanted to proceed with radiation therapy at that time. Had first treatment 4/15. Next scheduled mid-May. Given new findings lung involvement we were consulted to assist with GOC. The patient today was very anxious. She did not want to discuss code status or GOC. She is aware of her diagnosis and prognosis and capable of medical decision making. She did tell me to speak with her son. I spoke to Rio over phone. Prior to hospitalizaton in [facility] at [facility], she had been living at home independently. After that hospitalization she was dc [facility]. She has gotten " progressively weaker and no longer ambulating, needs a lot more assistance with basic ADL's. D/w son that given the chronic and progressive nature of her disease, unlikely that she will be able to return to her baseline level of function she was at earlier on this year. I reviewed problems, labs, imaging with him over the phone. He was appreciative of updates. We discussed code status, hospice, palliative recs would be change code status [id number] continue with XRT if she wishes to and then proceed with hospice care. He will talk to her. We will also revisit discussion with the patient tomorrow in hopes that she is willing to engage in the conversation, this is markedly limited today due to her anxiety which her son states is a lifelong problem but more so now with everything that is going on.    5/8  Patient less anxious today, able to participate in goals of care discussion. Patient thought that she was active with hospice at [facility], stating she signed papers. Care coordination was involved and she confirmed that patient had informational meeting with [facility], but did not admit to hospice. Per chart review, F Palliative care and primary oncologist both recommended hospice for supportive care. I discussed goals with patient, and she verbalized worry about son and her cat. I refocused conversation to patient and she did not want to be in pain or struggle to breath. She is willing to accept the planned thoracentesis, but after discussion about the risks/benefits of CPR intubation, she did not want either done. I discussed with attending service, and intensivist overnight had conversation with son in which he was also supportive of DNRCCA/DNI. Signed copy of DNRCCA/DNI was placed in chart. I called son, but phone was in do not disturb mode. I was unable to discuss. Patient would like further discussion about goals of care to involve her son. She is not opposed to the idea of hospice and supportive care  per my discussion today. Will attempt to contact son to set up family meeting.     I spent 60 minutes in the professional and overall care of this patient.      Alicia Whitehead, RUTH-CNP

## 2024-05-08 NOTE — NURSING NOTE
Patient admitted to icu room 6.  Head to toe skin assessment completed.  Mepelex heel and sacral borders applied.  No skin breakdown noted.

## 2024-05-09 ENCOUNTER — APPOINTMENT (OUTPATIENT)
Dept: RADIOLOGY | Facility: HOSPITAL | Age: 64
End: 2024-05-09
Payer: MEDICAID

## 2024-05-09 LAB
ANION GAP SERPL CALC-SCNC: 13 MMOL/L
BACTERIA UR CULT: ABNORMAL
BACTERIA UR CULT: ABNORMAL
BASOPHILS # BLD AUTO: 0.01 X10*3/UL (ref 0–0.1)
BASOPHILS NFR BLD AUTO: 0.1 %
BUN SERPL-MCNC: 26 MG/DL (ref 8–25)
CALCIUM SERPL-MCNC: 10.7 MG/DL (ref 8.5–10.4)
CHLORIDE SERPL-SCNC: 103 MMOL/L (ref 97–107)
CO2 SERPL-SCNC: 22 MMOL/L (ref 24–31)
CORTIS AM PEAK SERPL-MSCNC: 20.5 UG/DL (ref 5–20)
CREAT SERPL-MCNC: 0.8 MG/DL (ref 0.4–1.6)
EGFRCR SERPLBLD CKD-EPI 2021: 82 ML/MIN/1.73M*2
EOSINOPHIL # BLD AUTO: 0 X10*3/UL (ref 0–0.7)
EOSINOPHIL NFR BLD AUTO: 0 %
ERYTHROCYTE [DISTWIDTH] IN BLOOD BY AUTOMATED COUNT: 18.5 % (ref 11.5–14.5)
GLUCOSE BLD MANUAL STRIP-MCNC: 144 MG/DL (ref 74–99)
GLUCOSE BLD MANUAL STRIP-MCNC: 154 MG/DL (ref 74–99)
GLUCOSE BLD MANUAL STRIP-MCNC: 171 MG/DL (ref 74–99)
GLUCOSE BLD MANUAL STRIP-MCNC: 187 MG/DL (ref 74–99)
GLUCOSE SERPL-MCNC: 175 MG/DL (ref 65–99)
HCT VFR BLD AUTO: 29.6 % (ref 36–46)
HGB BLD-MCNC: 9.1 G/DL (ref 12–16)
IMM GRANULOCYTES # BLD AUTO: 0.05 X10*3/UL (ref 0–0.7)
IMM GRANULOCYTES NFR BLD AUTO: 0.6 % (ref 0–0.9)
LYMPHOCYTES # BLD AUTO: 0.47 X10*3/UL (ref 1.2–4.8)
LYMPHOCYTES NFR BLD AUTO: 5.5 %
MAGNESIUM SERPL-MCNC: 1.8 MG/DL (ref 1.6–3.1)
MCH RBC QN AUTO: 24.8 PG (ref 26–34)
MCHC RBC AUTO-ENTMCNC: 30.7 G/DL (ref 32–36)
MCV RBC AUTO: 81 FL (ref 80–100)
MONOCYTES # BLD AUTO: 0.24 X10*3/UL (ref 0.1–1)
MONOCYTES NFR BLD AUTO: 2.8 %
NEUTROPHILS # BLD AUTO: 7.85 X10*3/UL (ref 1.2–7.7)
NEUTROPHILS NFR BLD AUTO: 91 %
NRBC BLD-RTO: 0 /100 WBCS (ref 0–0)
PHOSPHATE SERPL-MCNC: 2.1 MG/DL (ref 2.5–4.5)
PLATELET # BLD AUTO: 286 X10*3/UL (ref 150–450)
POTASSIUM SERPL-SCNC: 3.7 MMOL/L (ref 3.4–5.1)
RBC # BLD AUTO: 3.67 X10*6/UL (ref 4–5.2)
SODIUM SERPL-SCNC: 138 MMOL/L (ref 133–145)
WBC # BLD AUTO: 8.6 X10*3/UL (ref 4.4–11.3)

## 2024-05-09 PROCEDURE — 2500000004 HC RX 250 GENERAL PHARMACY W/ HCPCS (ALT 636 FOR OP/ED): Performed by: NURSE PRACTITIONER

## 2024-05-09 PROCEDURE — 9420000001 HC RT PATIENT EDUCATION 5 MIN

## 2024-05-09 PROCEDURE — 2500000002 HC RX 250 W HCPCS SELF ADMINISTERED DRUGS (ALT 637 FOR MEDICARE OP, ALT 636 FOR OP/ED)

## 2024-05-09 PROCEDURE — 2500000004 HC RX 250 GENERAL PHARMACY W/ HCPCS (ALT 636 FOR OP/ED)

## 2024-05-09 PROCEDURE — 94640 AIRWAY INHALATION TREATMENT: CPT

## 2024-05-09 PROCEDURE — 71045 X-RAY EXAM CHEST 1 VIEW: CPT

## 2024-05-09 PROCEDURE — 94660 CPAP INITIATION&MGMT: CPT

## 2024-05-09 PROCEDURE — 2500000005 HC RX 250 GENERAL PHARMACY W/O HCPCS

## 2024-05-09 PROCEDURE — 2500000001 HC RX 250 WO HCPCS SELF ADMINISTERED DRUGS (ALT 637 FOR MEDICARE OP)

## 2024-05-09 PROCEDURE — 71045 X-RAY EXAM CHEST 1 VIEW: CPT | Performed by: RADIOLOGY

## 2024-05-09 PROCEDURE — 2500000001 HC RX 250 WO HCPCS SELF ADMINISTERED DRUGS (ALT 637 FOR MEDICARE OP): Performed by: NURSE PRACTITIONER

## 2024-05-09 PROCEDURE — C9113 INJ PANTOPRAZOLE SODIUM, VIA: HCPCS

## 2024-05-09 PROCEDURE — 37799 UNLISTED PX VASCULAR SURGERY: CPT

## 2024-05-09 PROCEDURE — 80048 BASIC METABOLIC PNL TOTAL CA: CPT

## 2024-05-09 PROCEDURE — 83735 ASSAY OF MAGNESIUM: CPT

## 2024-05-09 PROCEDURE — 85025 COMPLETE CBC W/AUTO DIFF WBC: CPT

## 2024-05-09 PROCEDURE — 82947 ASSAY GLUCOSE BLOOD QUANT: CPT

## 2024-05-09 PROCEDURE — 84100 ASSAY OF PHOSPHORUS: CPT

## 2024-05-09 PROCEDURE — 99291 CRITICAL CARE FIRST HOUR: CPT

## 2024-05-09 PROCEDURE — 99233 SBSQ HOSP IP/OBS HIGH 50: CPT | Performed by: NURSE PRACTITIONER

## 2024-05-09 PROCEDURE — 2060000001 HC INTERMEDIATE ICU ROOM DAILY

## 2024-05-09 PROCEDURE — 2500000005 HC RX 250 GENERAL PHARMACY W/O HCPCS: Performed by: INTERNAL MEDICINE

## 2024-05-09 RX ORDER — POLYETHYLENE GLYCOL 3350 17 G/17G
17 POWDER, FOR SOLUTION ORAL DAILY PRN
Status: DISCONTINUED | OUTPATIENT
Start: 2024-05-09 | End: 2024-05-15 | Stop reason: HOSPADM

## 2024-05-09 RX ORDER — MAGNESIUM SULFATE HEPTAHYDRATE 40 MG/ML
2 INJECTION, SOLUTION INTRAVENOUS ONCE
Status: COMPLETED | OUTPATIENT
Start: 2024-05-09 | End: 2024-05-09

## 2024-05-09 RX ORDER — MIDODRINE HYDROCHLORIDE 10 MG/1
10 TABLET ORAL
Status: DISCONTINUED | OUTPATIENT
Start: 2024-05-09 | End: 2024-05-11

## 2024-05-09 RX ADMIN — IPRATROPIUM BROMIDE AND ALBUTEROL SULFATE 3 ML: 2.5; .5 SOLUTION RESPIRATORY (INHALATION) at 15:20

## 2024-05-09 RX ADMIN — METHYLPREDNISOLONE SODIUM SUCCINATE 40 MG: 40 INJECTION, POWDER, FOR SOLUTION INTRAMUSCULAR; INTRAVENOUS at 05:06

## 2024-05-09 RX ADMIN — LEVETIRACETAM 750 MG: 100 SOLUTION ORAL at 21:07

## 2024-05-09 RX ADMIN — METHOCARBAMOL 500 MG: 500 TABLET ORAL at 21:07

## 2024-05-09 RX ADMIN — HYDROMORPHONE HYDROCHLORIDE 0.2 MG: 1 INJECTION, SOLUTION INTRAMUSCULAR; INTRAVENOUS; SUBCUTANEOUS at 05:09

## 2024-05-09 RX ADMIN — ATORVASTATIN CALCIUM 20 MG: 20 TABLET, FILM COATED ORAL at 21:07

## 2024-05-09 RX ADMIN — BRIMONIDINE TARTRATE 1 DROP: 2 SOLUTION/ DROPS OPHTHALMIC at 21:07

## 2024-05-09 RX ADMIN — HYDROMORPHONE HYDROCHLORIDE 0.2 MG: 1 INJECTION, SOLUTION INTRAMUSCULAR; INTRAVENOUS; SUBCUTANEOUS at 21:08

## 2024-05-09 RX ADMIN — ACETYLCYSTEINE 600 MG: 200 SOLUTION ORAL; RESPIRATORY (INHALATION) at 07:06

## 2024-05-09 RX ADMIN — POTASSIUM PHOSPHATE, MONOBASIC AND POTASSIUM PHOSPHATE, DIBASIC 15 MMOL: 224; 236 INJECTION, SOLUTION, CONCENTRATE INTRAVENOUS at 06:54

## 2024-05-09 RX ADMIN — INSULIN LISPRO 1 UNITS: 100 INJECTION, SOLUTION INTRAVENOUS; SUBCUTANEOUS at 17:00

## 2024-05-09 RX ADMIN — Medication 100 MG: at 09:27

## 2024-05-09 RX ADMIN — PIPERACILLIN SODIUM AND TAZOBACTAM SODIUM 3.38 G: 3; .375 INJECTION, SOLUTION INTRAVENOUS at 05:06

## 2024-05-09 RX ADMIN — MIDODRINE HYDROCHLORIDE 10 MG: 10 TABLET ORAL at 10:40

## 2024-05-09 RX ADMIN — MIDODRINE HYDROCHLORIDE 10 MG: 10 TABLET ORAL at 16:59

## 2024-05-09 RX ADMIN — GABAPENTIN 500 MG: 250 SOLUTION ORAL at 21:10

## 2024-05-09 RX ADMIN — HYDROXYZINE HYDROCHLORIDE 25 MG: 25 TABLET, FILM COATED ORAL at 21:07

## 2024-05-09 RX ADMIN — IPRATROPIUM BROMIDE AND ALBUTEROL SULFATE 3 ML: 2.5; .5 SOLUTION RESPIRATORY (INHALATION) at 18:59

## 2024-05-09 RX ADMIN — INSULIN LISPRO 1 UNITS: 100 INJECTION, SOLUTION INTRAVENOUS; SUBCUTANEOUS at 08:07

## 2024-05-09 RX ADMIN — IPRATROPIUM BROMIDE AND ALBUTEROL SULFATE 3 ML: 2.5; .5 SOLUTION RESPIRATORY (INHALATION) at 23:28

## 2024-05-09 RX ADMIN — ENOXAPARIN SODIUM 40 MG: 40 INJECTION SUBCUTANEOUS at 09:15

## 2024-05-09 RX ADMIN — ACETYLCYSTEINE 600 MG: 200 SOLUTION ORAL; RESPIRATORY (INHALATION) at 18:59

## 2024-05-09 RX ADMIN — Medication 30 L/MIN: at 08:00

## 2024-05-09 RX ADMIN — IPRATROPIUM BROMIDE AND ALBUTEROL SULFATE 3 ML: 2.5; .5 SOLUTION RESPIRATORY (INHALATION) at 10:25

## 2024-05-09 RX ADMIN — ACETYLCYSTEINE 600 MG: 200 SOLUTION ORAL; RESPIRATORY (INHALATION) at 10:25

## 2024-05-09 RX ADMIN — Medication 40 PERCENT: at 19:00

## 2024-05-09 RX ADMIN — GABAPENTIN 250 MG: 250 SOLUTION ORAL at 09:00

## 2024-05-09 RX ADMIN — GABAPENTIN 250 MG: 250 SOLUTION ORAL at 14:40

## 2024-05-09 RX ADMIN — METHYLPREDNISOLONE SODIUM SUCCINATE 40 MG: 40 INJECTION, POWDER, FOR SOLUTION INTRAMUSCULAR; INTRAVENOUS at 21:07

## 2024-05-09 RX ADMIN — IPRATROPIUM BROMIDE AND ALBUTEROL SULFATE 3 ML: 2.5; .5 SOLUTION RESPIRATORY (INHALATION) at 07:06

## 2024-05-09 RX ADMIN — PANTOPRAZOLE SODIUM 40 MG: 40 INJECTION, POWDER, FOR SOLUTION INTRAVENOUS at 09:16

## 2024-05-09 RX ADMIN — PIPERACILLIN SODIUM AND TAZOBACTAM SODIUM 3.38 G: 3; .375 INJECTION, SOLUTION INTRAVENOUS at 21:07

## 2024-05-09 RX ADMIN — PSYLLIUM HUSK 1 PACKET: 3.4 POWDER ORAL at 14:42

## 2024-05-09 RX ADMIN — PIPERACILLIN SODIUM AND TAZOBACTAM SODIUM 3.38 G: 3; .375 INJECTION, SOLUTION INTRAVENOUS at 16:04

## 2024-05-09 RX ADMIN — HYDROXYZINE HYDROCHLORIDE 25 MG: 25 TABLET, FILM COATED ORAL at 05:09

## 2024-05-09 RX ADMIN — INSULIN LISPRO 1 UNITS: 100 INJECTION, SOLUTION INTRAVENOUS; SUBCUTANEOUS at 12:16

## 2024-05-09 RX ADMIN — PIPERACILLIN SODIUM AND TAZOBACTAM SODIUM 3.38 G: 3; .375 INJECTION, SOLUTION INTRAVENOUS at 09:15

## 2024-05-09 RX ADMIN — BRIMONIDINE TARTRATE 1 DROP: 2 SOLUTION/ DROPS OPHTHALMIC at 09:16

## 2024-05-09 RX ADMIN — METHYLPREDNISOLONE SODIUM SUCCINATE 40 MG: 40 INJECTION, POWDER, FOR SOLUTION INTRAMUSCULAR; INTRAVENOUS at 12:16

## 2024-05-09 RX ADMIN — METHOCARBAMOL 500 MG: 500 TABLET ORAL at 09:16

## 2024-05-09 RX ADMIN — ACETYLCYSTEINE 600 MG: 200 SOLUTION ORAL; RESPIRATORY (INHALATION) at 15:20

## 2024-05-09 RX ADMIN — MAGNESIUM SULFATE HEPTAHYDRATE 2 G: 40 INJECTION, SOLUTION INTRAVENOUS at 06:07

## 2024-05-09 RX ADMIN — LEVETIRACETAM 750 MG: 100 SOLUTION ORAL at 09:29

## 2024-05-09 ASSESSMENT — PAIN - FUNCTIONAL ASSESSMENT
PAIN_FUNCTIONAL_ASSESSMENT: 0-10

## 2024-05-09 ASSESSMENT — PAIN SCALES - GENERAL
PAINLEVEL_OUTOF10: 1
PAINLEVEL_OUTOF10: 0 - NO PAIN
PAINLEVEL_OUTOF10: 7
PAINLEVEL_OUTOF10: 0 - NO PAIN
PAINLEVEL_OUTOF10: 1
PAINLEVEL_OUTOF10: 0 - NO PAIN
PAINLEVEL_OUTOF10: 3
PAINLEVEL_OUTOF10: 7

## 2024-05-09 ASSESSMENT — PAIN DESCRIPTION - DESCRIPTORS
DESCRIPTORS: ACHING

## 2024-05-09 NOTE — CARE PLAN
Problem: Respiratory  Goal: Clear secretions with interventions this shift  Outcome: Progressing  Goal: Minimize anxiety/maximize coping throughout shift  Outcome: Progressing  Goal: Minimal/no exertional discomfort or dyspnea this shift  Outcome: Progressing  Goal: No signs of respiratory distress (eg. Use of accessory muscles. Peds grunting)  Outcome: Progressing  Goal: Patent airway maintained this shift  Outcome: Progressing  Goal: Wean oxygen to maintain O2 saturation per order/standard this shift  Outcome: Progressing

## 2024-05-09 NOTE — PROGRESS NOTES
"Music Therapy Note    Debbi Segura was referred by     Therapy Session  Referral Type: New referral this admission  Visit Type: New visit  Session Start Time: 1125  Session End Time: 1219  Intervention Delivery: In-person  Conflict of Service: None  Family Present for Session: None     Pre-assessment  Unable to Assess Reason: Outcomes not assessed  Mood/Affect: Calm, Anxious, Depressed  Verbalized Emotional State: Grief, Anxiety, Depression, Loneliness         Treatment/Interventions  Areas of Focus: Coping, Normalization, Emotional support  Music Therapy Interventions: Assessment, Empathic listening/validating emotions, Active music engagement, Recorded music listening  Interruption: No  Patient Fell Asleep at End of Session: No    Post-assessment  Unable to Assess Reason: Outcomes not evaluated  Mood/Affect: Calm  Verbalized Emotional State: Gratitude, Loneliness  Continue Visiting: Yes  Total Session Time (min): 54 minutes    Narrative  Assessment Detail: Pt was laying in bed. Pt was excited for services and shared enjoying [place] music and music from other cultures. Pt stated really needing someone to talk to and expressed feelings of loneliness, anxiety, and depression.  Plan: To imporve coping, normalization, and emotional support through music intervention  Intervention: MT played [place] music while pt discussed feelings about many topics includingher family, her tx, her cats, and her [employer] beliefs. Mt began playing [place] but pt stopped song.  Evaluation: Pt stopped [place] due to the fear of crying and having a panic attack. Pt stated needing someone to talk to and gratitude for services. Pt expressed her experience with having a stutter and discussed stuttering with MT. [place] music should continue to be used in sessions.  Follow-up: Will follow up throughout admisison  Patient Comments: \"I really like you. You wouldn't lie to me.\"    Education Documentation  No " documentation found.

## 2024-05-09 NOTE — CARE PLAN
Problem: Respiratory  Goal: Clear secretions with interventions this shift  Outcome: Progressing  Goal: Minimal/no exertional discomfort or dyspnea this shift  Outcome: Progressing  Goal: No signs of respiratory distress (eg. Use of accessory muscles. Peds grunting)  Outcome: Progressing  Goal: Patent airway maintained this shift  Outcome: Progressing  Goal: Wean oxygen to maintain O2 saturation per order/standard this shift  Outcome: Progressing

## 2024-05-09 NOTE — PROGRESS NOTES
Patient not medically clear. Patient to have a thoracentesis. Patient on IV antibiotics. Palliative following. At the time of discharge patient will return to [facility] where she is living long term. Will follow.      05/09/24 1340   Discharge Planning   Home or Post Acute Services Post acute facilities (Rehab/SNF/etc)   Type of Post Acute Facility Services Long term care   Patient expects to be discharged to: [facility]   Does the patient need discharge transport arranged? Yes   RoundTrip coordination needed? Yes

## 2024-05-09 NOTE — PROGRESS NOTES
Debbi Segura is a 63 y.o. female on day 2 of admission presenting with Sepsis, due to unspecified organism, unspecified whether acute organ dysfunction present (Multi).    Subjective   Interval History:        patient seen and examined  Off pressors  Mild to moderate nonproductive cough  No chest pain  Remains on high flow    Review of Systems   All other systems reviewed and are negative.      Objective   Range of Vitals (last 24 hours)  Heart Rate:  []   Temp:  [35.8 °C (96.4 °F)-36.9 °C (98.4 °F)]   Resp:  [7-42]   BP: ()/(49-85)   Weight:  [49.1 kg (108 lb 3.9 oz)]   SpO2:  [86 %-99 %]   Daily Weight  05/09/24 : 49.1 kg (108 lb 3.9 oz)    Body mass index is 17.48 kg/m².    Physical Exam  Constitutional:       General: She is awake.      Appearance: She is ill-appearing.   HENT:      Head: Normocephalic and atraumatic.      Right Ear: External ear normal.      Left Ear: External ear normal.      Nose: Nose normal.   Eyes:      General: No scleral icterus.     Extraocular Movements: Extraocular movements intact.   Cardiovascular:      Rate and Rhythm: Tachycardia present.      Heart sounds: Normal heart sounds, S1 normal and S2 normal.   Pulmonary:      Breath sounds: Decreased breath sounds present.   Abdominal:      General: Bowel sounds are normal.      Palpations: Abdomen is soft.   Musculoskeletal:      Cervical back: Normal range of motion and neck supple.      Right lower leg: No edema.      Left lower leg: No edema.   Skin:     General: Skin is warm and dry.   Neurological:      Mental Status: She is alert.   Psychiatric:         Behavior: Behavior normal. Behavior is cooperative.     Antibiotics  aspirin chewable tablet 324 mg  famotidine PF (Pepcid) injection 20 mg  oxygen (O2) therapy  sodium chloride 0.9 % bolus 1,698 mL  vancomycin 1.5 g in 300 mL (Xellia) IVPB 1.5 g  furosemide (Lasix) injection 40 mg  iohexol (OMNIPaque) 350 mg iodine/mL solution 75 mL  furosemide (Lasix) injection 40  mg  aspirin chewable tablet 81 mg  acetaminophen (Tylenol) tablet 650 mg  acetaminophen (Tylenol) oral liquid 650 mg  acetaminophen (Tylenol) suppository 650 mg  enoxaparin (Lovenox) syringe 40 mg  polyethylene glycol (Glycolax, Miralax) packet 17 g  amLODIPine (Norvasc) tablet 10 mg  atorvastatin (Lipitor) tablet 20 mg  brimonidine (AlphaGAN) 0.2 % ophthalmic solution 1 drop  lisinopril tablet 20 mg  thiamine (Vitamin B-1) tablet 100 mg  piperacillin-tazobactam-dextrose (Zosyn) IV 3.375 g      methocarbamol (Robaxin) tablet  ondansetron (Zofran) tablet    pantoprazole (ProtoNix) EC tablet  hydrOXYzine (Atarax) tablet  levETIRAcetam (Keppra) 100 mg/mL solution 750 mg  hydrOXYzine HCL (Atarax) tablet 25 mg  methocarbamol (Robaxin) tablet 500 mg  ondansetron (Zofran) tablet 8 mg  sennosides (Senokot) tablet 17.2 mg  pantoprazole (ProtoNix) EC tablet 40 mg  ipratropium-albuteroL (Duo-Neb) 0.5-2.5 mg/3 mL nebulizer solution 3 mL  acetylcysteine (Mucomyst) 200 mg/mL (20 %) nebulizer solution 600 mg  fentaNYL (Duragesic) 50 mcg/hr patch 1 patch  fentaNYL (Duragesic) 12 mcg/hr patch 1 patch  gabapentin (Neurontin) solution 250 mg  gabapentin (Neurontin) solution 500 mg  methocarbamol (Robaxin) tablet 500 mg  vancomycin (Vancocin) pharmacy to dose - pharmacy monitoring  vancomycin (Xellia) 1 g in 200 mL (Xellia) IVPB 1 g  enoxaparin (Lovenox) syringe 40 mg  oxygen (O2) therapy  albumin human 5 % infusion 12.5 g  glucagon (Glucagen) injection 1 mg  dextrose 50 % injection 25 g  glucagon (Glucagen) injection 1 mg  dextrose 50 % injection 12.5 g  insulin lispro (HumaLOG) injection 0-5 Units  potassium chloride (Klor-Con) packet 20 mEq  pantoprazole (ProtoNix) EC tablet 40 mg  esomeprazole (NexIUM) suspension 40 mg  pantoprazole (ProtoNix) injection 40 mg  norepinephrine (Levophed) 8 mg in dextrose 5% 250 mL (0.032 mg/mL) infusion (premix)  methylPREDNISolone sod succinate (SOLU-Medrol) 40 mg/mL injection 40  "mg  hydrocodone-homatropine (Hycodan) 5-1.5 mg/5 mL syrup 5 mL  artificial saliva (yerbas-lyt) aerosol,spray 5 mL  sennosides-docusate sodium (Shivani-Colace) 8.6-50 mg per tablet 2 tablet  lidocaine-prilocaine (Emla) cream  HYDROmorphone (Dilaudid) injection 0.2 mg  potassium phosphates 15 mmol in sodium chloride 0.9% 250 mL IV  magnesium sulfate IV 2 g  oxygen (O2) therapy  midodrine (Proamatine) tablet 10 mg      Relevant Results  Labs  Results from last 72 hours   Lab Units 05/09/24 0456 05/08/24 0427 05/07/24 0222   WBC AUTO x10*3/uL 8.6 6.5 7.9   HEMOGLOBIN g/dL 9.1* 9.1* 10.0*   HEMATOCRIT % 29.6* 30.0* 32.5*   PLATELETS AUTO x10*3/uL 286 206 207   NEUTROS PCT AUTO % 91.0  --  77.9   LYMPHS PCT AUTO % 5.5  --  8.1   MONOS PCT AUTO % 2.8  --  9.5   EOS PCT AUTO % 0.0  --  3.8     Results from last 72 hours   Lab Units 05/09/24 0456 05/08/24 0427 05/07/24 0222   SODIUM mmol/L 138 140 136   POTASSIUM mmol/L 3.7 3.7 4.2   CHLORIDE mmol/L 103 103 100   CO2 mmol/L 22* 23* 24   BUN mg/dL 26* 28* 33*   CREATININE mg/dL 0.80 1.00 0.90   GLUCOSE mg/dL 175* 95 96   CALCIUM mg/dL 10.7* 9.7 9.7   ANION GAP mmol/L 13 14 12   EGFR mL/min/1.73m*2 82 63 72   PHOSPHORUS mg/dL 2.1* 3.4  --      Results from last 72 hours   Lab Units 05/07/24 0222   ALK PHOS U/L 90   BILIRUBIN TOTAL mg/dL 0.3   PROTEIN TOTAL g/dL 6.8   ALT U/L 9   AST U/L 14   ALBUMIN g/dL 3.0*     Estimated Creatinine Clearance: 55.1 mL/min (by C-G formula based on SCr of 0.8 mg/dL).  No results found for: \"CRP\"  Microbiology  Susceptibility data from last 14 days.  Collected Specimen Info Organism   05/08/24 Tissue/Biopsy from Wound/Tissue Mixed Gram-Positive and Gram-Negative Bacteria   05/07/24 Urine from Clean Catch/Voided Enteric bacilli     Imaging  XR chest 1 view    Result Date: 5/9/2024  Interpreted By:  Russell Rucker, STUDY: XR CHEST 1 VIEW;  5/9/2024 8:37 am   INDICATION: Signs/Symptoms:hypoxia, eval pleural effusions.   COMPARISON: Most recent " prior is from 05/07/2024. CT scan from 05/07/2024.   ACCESSION NUMBER(S): [id number]   ORDERING CLINICIAN: JESI LEON   TECHNIQUE: Single AP portable view of the chest was obtained.   FINDINGS: MEDIASTINUM/ LUNGS/ REGINE: Stable esophageal stent. Stable right-sided central venous MediPort. Progression of small right pleural effusion. Stable cardiomegaly. There are diffuse infiltrative opacities throughout the right lung. Stable collapse of the left upper lobe with hyper aeration of the left lower lobe. Persistent mild haziness at the left lung base. No blunting of the left lateral costophrenic sulcus.   BONES: No lytic or blastic destructive bone lesion.   UPPER ABDOMEN: Grossly intact.       Cardiomegaly.   Small right pleural effusion, mildly progressed.   Grossly stable infiltrative densities throughout the right lung.   Stable collapse of the left upper lobe with stable hyper aeration of the left lung. Mild stable infiltrate/atelectasis at the left lung base.   Stable esophageal stent. Stable right-sided central venous MediPort.   MACRO: None   Signed by: Russell Rucker 5/9/2024 10:37 AM Dictation workstation:   [id number]    XR chest 1 view    Result Date: 5/8/2024  Interpreted By:  Russell Escalante, STUDY: XR CHEST 1 VIEW;  5/7/2024 10:18 pm   INDICATION: Signs/Symptoms:hypoxemia.   COMPARISON: CXR 05/07/2024, CT chest 05/07/2024   ACCESSION NUMBER(S): [id number]   ORDERING CLINICIAN: SERA SMITH   FINDINGS: There is worsening consolidation within the right upper lobe, right middle lobe. Similar consolidation in the right lower lobe. There is redemonstration of a prominent left basilar opacity representing sub pulmonic effusion. There is redemonstration of complete left upper lobe collapse and left hilar mass representing locally invasive esophageal cancer and malignant lymphadenopathy. No pneumothorax.       Please see above.     MACRO: None.   Signed by: Russell Escalante 5/8/2024 1:28 AM Dictation  workstation:   [id number]    CT chest w IV contrast    Result Date: 5/7/2024  Interpreted By:  Chaitanya Lovell, STUDY: CT CHEST W IV CONTRAST;  5/7/2024 4:25 am   INDICATION: Signs/Symptoms:pleural effusion.   COMPARISON: 05/13/2022   ACCESSION NUMBER(S): [id number]   ORDERING CLINICIAN: JENN NEWMAN   TECHNIQUE: Helical data acquisition of the chest was obtained without intravenous contrast. Images were reformatted in axial, coronal, and sagittal planes.   FINDINGS: LOWER NECK AND CHEST WALL:  Right chest port catheter.   MEDIASTINUM/REGINE:No lymphadenopathy. Metallic esophageal stent in place. Trace debris within the stent lumen. Confluent soft tissue density within the posterior paratracheal and paraesophageal mediastinum may reflect confluence of adenopathy or locally advanced malignancy.   CARDIOVASCULAR:  Cardiac chamber size within normal limits. Small pericardial effusion. Right chest port catheter tip terminating at the SVC/RA junction. Aortic caliber normal. Normal caliber of main pulmonary artery. Scattered coronary atherosclerotic calcification.   LUNGS, AIRWAYS, AND PLEURA:  Severe focal narrowing of the left mainstem bronchus. There is complete occlusion/filling of the majority of the left lower lobar and left lower lobe segmental bronchi. Moderate bilateral pleural effusions. Moderate emphysema. Patchy ground-glass opacities throughout the right lung and dependent right lower lobe consolidation may reflect an infectious/inflammatory process. Somewhat nodular interlobular septal thickening at the medial right lung base may reflect lymphangitic carcinomatosis. There are spiculated pulmonary nodules within the left upper lobe measuring 2.1 cm, right upper lobe measuring 1.3 cm, and posteroinferior right lower lobe measuring 1.5 cm as well as the superior segment of the right lower lobe measuring 1.1 cm. There is complete collapse of the left upper lobe and complete filling of the left  upper lobar bronchi.   MUSCULOSKELETAL: No acute osseous abnormality or suspicious osseous lesions. Mild multilevel spinal degenerative change.   UPPER ABDOMEN: Unremarkable.       1. Esophageal stent in place with soft tissue attenuation material in the posterior mediastinum surrounding the esophagus and central airways, likely reflecting known locally advanced esophageal squamous cell carcinoma with possible additional superimposed confluent adenopathy. There is complete narrowing/filling of the left upper lobe airway with collapse/consolidation of the left upper lobe. There is partial filling/narrowing of the left lower lobe airway with extensive material filling the central bronchi of the left lower lobe. Extensive patchy ground-glass/consolidative opacities within the right lung concerning for an infectious/inflammatory process. 2. Moderate bilateral pleural effusions. 3. Spiculated bilateral pulmonary nodules, likely metastatic. 4. Small pericardial effusion. 5. Somewhat nodular interlobular septal thickening at the medial right lung base may reflect lymphangitic carcinomatosis.   Signed by: Chaitanya Lovell 5/7/2024 5:02 AM Dictation workstation:   [id number]    XR chest 1 view    Result Date: 5/7/2024  Interpreted By:  Russell Ecsalante, STUDY: XR CHEST 1 VIEW;  5/7/2024 2:29 am   INDICATION: Signs/Symptoms:Chest Pain.   COMPARISON: 04/06/2017   ACCESSION NUMBER(S): [id number]   ORDERING CLINICIAN: JENN NEWMAN   FINDINGS: There is an esophageal stent. There is a right chest port terminating over the cavoatrial junction.   There is a large left pleural effusion. There is new bilateral hilar enlargement and nodularity likely representing lymphadenopathy. There is left hemithorax volume loss. There is airspace disease at the left lung base. There is diffuse peribronchovascular and interstitial prominence. No pneumothorax.       Multiple cardiopulmonary abnormalities including probable pulmonary  edema, large left pleural effusion, bilateral hilar and mediastinal lymphadenopathy and probable airway obstruction on the left resulting in volume loss of the left hemithorax. CT chest with contrast is recommended for further evaluation.   Esophageal stent consistent with esophageal cancer.   MACRO: None.   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 M: CTBWO_3 CT Dose-Length Product (DLP): 1208  mGy*cm CT Dose Reduction Employed: Automated exposure control (AEC) COMPARISON:  CT brain 04/13/2024.  MRI brain 04/12/2024. RESULT: Acute change:  No evidence of an acute intracranial process. Hemorrhage:  No evidence of acute intracranial hemorrhage. Mass Lesion / Mass Effect: Again noted is the low attenuating mass lesion in the left medial parietal lobe with confluent surrounding vasogenic edema, intracranial metastasis, better delineated on the prior MR brain 04/12/2024.  Otherwise, no midline shift or herniation. Chronic change:  Patchy nonspecific supratentorial white matter changes likely reflecting chronic microvascular ischemia. Parenchyma:  Mild generalized parenchymal volume loss. Ventricles:  Commensurate with volume loss. Other:  The calvarium, skull base, imaged paranasal sinuses, mastoids, orbits and extracranial soft tissues are unremarkable.  Stereotactic frame is in place.  (topogram) images:  No additional findings.    IMPRESSION: Localization exam.  No acute intracranial process. : V[facility]   Transcribe Date/Time: Apr 16 2024 11:46A Dictated by : KAREY AVELAR DO  This examination was interpreted and the report reviewed and electronically signed by: KAREY AVELAR DO on Apr 16 2024 11:49AM  EST    CT BRAIN STEREOLOCAL WO IVCON    Result Date: 4/13/2024  * * *Final Report* * * DATE OF EXAM: Apr 13 2024  2:31PM   [facility] [place]   0503  -  CT BRAIN STEREOLOCAL WO IVCON  / ACCESSION #  [id number] PROCEDURE REASON: Other (document in comments)      * * * * Physician Interpretation * * * *  EXAMINATION:  CT STEREOLOCALIZATION BRAIN WITH CONTRAST HISTORY:  63 year old female with left parietal mass, preoperative stereo localization exam. TECHNIQUE: CT head high-resolution stereotactic localization without contrast. M: CTBWO_3 CT Dose-Length Product (DLP): 1336  mGy*cm CT Dose Reduction Employed: Automated exposure control (AEC) COMPARISON:  MR brain with and without contrast 04/12/2024. RESULT: The patient's known left parietal mass lesion is better demonstrated on the 04/12/2024 MRI with and without contrast.  There is surrounding vasogenic edema.  Mild local mass effect with partial effacement of the left occipital horn and atrium of the left lateral ventricle. No acute infarct or hemorrhage.  No midline shift or abnormal extra-axial fluid collection.  Scattered white matter hypoattenuation, nonspecific, however likely representing sequela of prior chronic microvascular ischemia.  Mild to moderate generalized parenchymal volume loss with commensurate prominence of the cortical sulci and ventricles.  Partial left lateral ventricular effacement, as above.  No hydrocephalus or ventricular trapping. Paranasal sinuses are clear.  Mastoid air cells and middle ear cavities are clear bilaterally.  Bilateral orbits are within normal limits.   Overlying soft tissues demonstrate no focal abnormality.  No destructive calvarial lesion.  Patient is edentulous.    IMPRESSION: Stereotactic localization examination. Known left parietal lesion is better demonstrated on the 04/12/2024 MRI with and without  contrast. No acute hemorrhage or large territorial infarct. : [facility]   Transcribe Date/Time: Apr 13 2024  2:33P Dictated by : RAJ DELGADO MD This examination was interpreted and the report reviewed and electronically signed by: ROBERT HAMPTON MD on Apr 13 2024  2:50PM  EST    FL enema single contrast water soluble    Result Date: 4/12/2024  * * *Final Report* * * DATE OF EXAM: Apr 12 2024  2:28PM   [facility]   [id number]  -  XR COLON SINGLE CONTRAST  / ACCESSION #  [id number] PROCEDURE REASON: Assess for fistula      * * * * Physician Interpretation * * * *  WATER SOLUBLE CONTRAST ENEMA HISTORY: Assess for rectovaginal fistula. COMPARISON: CT abdomen and pelvis 02/27/2024 TECHNIQUE: Water soluble-contrast enema was performed after insertion of a rectal tube.  Spot and overhead images were obtained. Contrast: RECTAL:  400 ml of OMNIPAQUE 300 Fluoroscopy radiation summary: Fluoroscopy time: 1:30 (min:sec). Air kerma: 80.5 mGy. RESULT: : No dilated loops of bowel.  Bilateral pelvic tubal ligation clips.  Calcified uterine fibroids. Contrast refluxes to the level of the mid sigmoid colon.  There is no communication with the vagina.  Filling defects in the opacified colon consistent with feces. Examination ended due to patient discomfort and difficulty retaining the contrast material. The study was performed by CHANCE Sal, under the supervision of Dr. Bishop, who was present for the critical portion of the exam.   Images associated with this study were submitted for interpretation and reviewed by Dr. Bishop. ===========    IMPRESSION: NO DEMONSTRATED RECTOVAGINAL FISTULA. : [facility]   Transcribe Date/Time: Apr 12 2024  2:38P Dictated by : CHANCE SAL This examination was interpreted and the report reviewed and electronically signed by: MABEL BISHOP MD on Apr 12 2024  3:25PM  EST    MR brain w and wo IV contrast    Result Date: 4/12/2024  * * *Final Report* * * DATE OF EXAM:  Apr 12 2024  5:39AM   [facility]   0295  -  MRI BRAIN WO/W IVCON  / ACCESSION #  [id number] PROCEDURE REASON: Metastatic disease evaluation      * * * * Physician Interpretation * * * *  EXAMINATION:  MRI BRAIN WO/W IVCON CLINICAL HISTORY: Gamma knife protocol preop localization exam. TECHNIQUE:  Limited Gamma knife protocol coronal T1 pre and post contrast, axial postcontrast T1, axial T2 sequence. Contrast:  11 mL [facility] IV COMPARISON: MRI of yesterday RESULT: Mass Lesion/ Mass Effect:    Peripherally enhancing cystic and solid right parietal mass measuring approximately 3 x 3 x 3 cm with more heterogenous solid components along its lateral margin.  Moderate surrounding vasogenic edema and mild locoregional mass effect.  No midline shift. No other abnormal enhancing brain lesions. Chronic Change:  Unchanged burden of supra and infratentorial chronic small vessel change. Parenchyma:   No significant volume loss for age. Ventricles:     Normal caliber and morphology. Vasculature:    Major intracranial arterial structures, and dural venous sinuses show typical flow void, suggesting patency by spin echo criteria. Other:  The visualized paranasal sinuses and mastoid air cells are clear.  The orbits and extracranial soft tissues are unremarkable.    IMPRESSION: Unchanged left parietal cystic and solid mass most compatible with metastasis.  No other enhancing brain lesions. : [facility]   Transcribe Date/Time: Apr 12 2024  6:13A Dictated by : ADIN DARBY MD This examination was interpreted and the report reviewed and electronically signed by: ADIN DARBY MD on Apr 12 2024  6:30AM  EST    MR brain w and wo IV contrast    Result Date: 4/11/2024  * * *Final Report* * * DATE OF EXAM: Apr 10 2024 11:48PM   [facility]   0295  -  MRI BRAIN WO/W IVCON  / ACCESSION #  [id number] PROCEDURE REASON: Brain/CNS neoplasm, monitor      * * * * Physician Interpretation * * * *  EXAMINATION:  MRI BRAIN WO IVCON CLINICAL HISTORY:  Brain CNS neoplasm. TECHNIQUE:  Routine brain MRI protocol without and with contrast including diffusion images. MQ:  [id number]_2 Contrast: None COMPARISON: CT brain 04/09/2024 RESULT: Truncated exam secondary to patient tolerance.  No postcontrast sequences are available. Acute Change:   There is no evidence of restricted diffusion to suggest an acute infarct.  There is some restricted diffusion around the periphery of the left parietal mass. Hemorrhage:    No evidence of prior parenchymal hemorrhage on the gradient echo images. Mass Lesion/ Mass Effect: 3 cm left parietal mass and moderate surrounding vasogenic edema. Chronic Change:  Scattered punctate foci of increased T2 and FLAIR signal are noted in the supratentorial and infratentorial white matter which is a nonspecific finding, but likely represents mild to moderate chronic microvascular ischemia. Parenchyma:   No significant volume loss for age. Ventricles:     Normal caliber and morphology. Skull Base:    Hypothalamic and pituitary region are grossly normal.   Craniocervical junction is normal. No significant marrow replacement process. Vasculature:    Major intracranial arterial structures, and dural venous sinuses show typical flow void, suggesting patency by spin echo criteria. Other:  No abnormal signal in the sinuses or mastoids.  The orbits and extracranial soft tissues are unremarkable.    IMPRESSION: Left parietal mass and surrounding vasogenic edema are unchanged from the CT of yesterday allowing for differences in modality. Truncated exam secondary to patient tolerance with no postcontrast imaging limiting evaluation for additional metastatic disease. : VEENA   Transcribe Date/Time: Apr 11 2024 12:27A Dictated by : ADIN DARBY MD This examination was interpreted and the report reviewed and electronically signed by: ADIN DARBY MD on Apr 11 2024 12:40AM  EST    XR chest 1 view    Result Date: 4/10/2024  * * *Final Report* * * DATE  OF EXAM: Apr 9 2024 11:00PM   [facility]   [id number]  -  XR CHEST 1V FRONTAL PORT  / ACCESSION #  [id number] PROCEDURE REASON: Shortness of breath      * * * * Physician Interpretation * * * *  EXAMINATION:  CHEST RADIOGRAPH (PORTABLE SINGLE VIEW AP) Exam Date/Time:  4/9/2024 11:00 PM Clinical History: Shortness of breath MQ:  XCPMC_6 Comparison:  Earlier the same day. RESULT: Lines, tubes, and devices:  Stable right-sided Port-A-Cath and esophageal stent.  Lungs and pleura:  No significant change of small left pleural effusion and associated basilar atelectasis.  Stable left parahilar opacity also related to atelectasis..  No pneumothorax.  Known metastatic lung nodules at the seen on prior chest CT.  No pneumothorax. Cardiomediastinal silhouette:  Stable cardiomediastinal silhouette. Other:  .    IMPRESSION: See result. : VEENA   Transcribe Date/Time: Apr 10 2024  7:11A Dictated by : SERENE HELM MD This examination was interpreted and the report reviewed and electronically signed by: SERENE HELM MD on Apr 10 2024  7:16AM  EST     Assessment/Plan   Shock, possibly septic  Acute hypoxic respiratory failure-differentials include lung collapse, pleural effusion, infection  Abnormal CT chest-pleural effusion, left-sided lung collapse, possible pneumonia  Klebsiella urinary tract infection  Metastatic esophageal cancer     Continue Zosyn  IV vancomycin   follow-up Hg wound culture  Follow-up pleural fluid workup  Oxygen as needed  Supportive care  Monitor temperature and WBC         Dylon Blackmon MD

## 2024-05-09 NOTE — PROGRESS NOTES
[facility] [place] Critical Care Medicine       Date:  5/9/2024  Patient:  Debbi Segura  YOB: 1960  MRN:  [id number]   Admit Date:  5/7/2024    Chief Complaint   Patient presents with    Respiratory Distress         History of Present Illness:  Debbi Segura is a 63 y.o. year old female patient with Past Medical History of  malignant esophageal cancer with mets to the brain and lungs, HTN, dyslipidemia, severe protein-calorie malnutrition, glaucoma, COPD, CKD stage III that presented to the ED from her nursing facility for shortness of breath.  Pt was noted to be hypoxic with a SpO2 in the 60s at her nursing facility which led to EMS being called. Nursing facility noted that pt had an associated fever as well earlier that day.  Pt was placed on a nonrebreather in the ED and then switched to hiflo to maintain a SpO2 >92%.  Pt was admitted to the hospital SDU under Dr. Deal.      Hospitalist was contacted overnight due to pt having respiratory distress with increasing O2 needs. CXR at that time significant for worsening consolidation within the right upper lobe, right middle lobe.  ABG with a pH 7.52, pCO2 26, pO2 64, HCO3 21.2.  Pt was tachypneic with respiratory rate in the 40s at this time.  Pt was transferred to the ICU on HiFlo NC at 40L/70%.       Interval ICU Events:  5/7: Pt transferred to the ICU for acute hypoxic respiratory distress with increasing O2 needs.  Pt currently on 40L/70% with respiratory rate of 42 breaths per minute.  Pt was tachycardic in the 120s with BP 78/59.  Pt placed on CPAP and given 250cc albumin.  Plan to repeat ABG in the morning prior to removing CPAP.       5/8: Pt wore cpap most of the night until she started coughing and is back on HFNC this morning, tolerating it well. IR consulted for thoracentesis. Pt mildly hypotensive this morning, will start on low-dose levophed. Sending am cortisol to see if pt is adrenally insufficient.    5/9: No acute events overnight. Pt  remained on HFNC all night, down to 30L/50%, was on 0.01 of levo, which has been off since 11pm last night. Will start midodrine.     Medical History:  Past Medical History:   Diagnosis Date    Age-related nuclear cataract, left eye 12/11/2015    Cataract, nuclear sclerotic, left eye    Age-related nuclear cataract, left eye 12/11/2015    Age-related nuclear cataract of left eye    Age-related nuclear cataract, right eye 12/11/2015    Cataract, nuclear sclerotic, right eye    Age-related nuclear cataract, right eye 12/11/2015    Age-related nuclear cataract of right eye    Age-related nuclear cataract, right eye 12/11/2015    Age-related nuclear cataract of right eye    Low-tension glaucoma, bilateral, indeterminate stage 11/15/2016    Bilateral low-tension glaucoma, indeterminate stage    Low-tension glaucoma, unspecified eye, stage unspecified 12/11/2015    Low tension glaucoma    Low-tension glaucoma, unspecified eye, stage unspecified 12/11/2015    Glaucoma, low tension    Other specified health status     No known health problems    Personal history of other diseases of the musculoskeletal system and connective tissue 12/01/2014    History of low back pain     Past Surgical History:   Procedure Laterality Date    TUBAL LIGATION  01/06/2017    Tubal Ligation     Medications Prior to Admission   Medication Sig Dispense Refill Last Dose    acetaminophen (Tylenol) 325 mg tablet Take 2 tablets (650 mg) by mouth every 4 hours if needed (PAIN).       amLODIPine (Norvasc) 10 mg tablet Take 1 tablet (10 mg) by mouth once daily.       atorvastatin (Lipitor) 20 mg tablet Take 1 tablet (20 mg) by mouth once daily.       brimonidine (AlphaGAN P) 0.2 % ophthalmic solution Administer 1 drop into the right eye in the morning and 1 drop in the evening.       cholecalciferol (Vitamin D-3) 25 MCG (1000 UT) tablet Take 1 tablet (25 mcg) by mouth. CLARIFY DIRECTIONS       famotidine (Pepcid) 20 mg tablet Take 1 tablet (20 mg) by  mouth in the morning and 1 tablet (20 mg) before bedtime.       folic acid (Folvite) 1 mg tablet Take 1 tablet (1 mg) by mouth once daily.       gabapentin (NEURONTIN ORAL) 5 mL by peg tube route once daily.       hydrOXYzine HCL (Atarax) 25 mg tablet 1 tablet (25 mg) by j-tube route every 6 hours if needed for anxiety.       latanoprostene bunod (Vyzulta) 0.024 % drops Administer into affected eye(s). CLARIFY DIRECTIONS AND IF PATIENT HAS COMPLETED USAGE FOR THREE WEEKS       levETIRAcetam (Keppra) 100 mg/mL solution 7.5 mL (750 mg) by g-tube route 2 times a day.       lisinopril 20 mg tablet Take 1 tablet (20 mg) by mouth once daily.       methocarbamol (Robaxin) 500 mg tablet 1 tablet (500 mg) by g-tube route 3 times a day.       ondansetron (Zofran) 8 mg tablet 1 tablet (8 mg) by g-tube route every 8 hours if needed for nausea or vomiting.       pantoprazole (ProtoNix) 40 mg EC tablet Take 1 tablet (40 mg) by mouth once daily in the morning. Take before meals. Do not crush, chew, or split.       sennosides (Senokot) 8.6 mg tablet Take 2 tablets (17.2 mg) by mouth 2 times a day.       thiamine 100 mg tablet Take 1 tablet (100 mg) by mouth once daily.       UNABLE TO FIND phoressa eye drops  CLARIFY DIRECTIONS FOR USE        Glucose and Milk containing products (dairy)  Social History     Tobacco Use    Smoking status: Never     Passive exposure: Never    Smokeless tobacco: Never   Vaping Use    Vaping status: Never Used   Substance Use Topics    Alcohol use: Never    Drug use: Never     Family History   Problem Relation Name Age of Onset    Glaucoma Mother      Cancer Mother's Brother         Hospital Medications:    norepinephrine, 0.01-0.5 mcg/kg/min, Last Rate: Stopped (05/09/24 0600)          Current Facility-Administered Medications:     acetaminophen (Tylenol) tablet 650 mg, 650 mg, oral, q4h PRN, 650 mg at 05/07/24 0957 **OR** acetaminophen (Tylenol) oral liquid 650 mg, 650 mg, oral, q4h PRN, 650 mg at  05/08/24 2102 **OR** acetaminophen (Tylenol) suppository 650 mg, 650 mg, rectal, q4h PRN, Bill Glalos, PA-C    acetylcysteine (Mucomyst) 200 mg/mL (20 %) nebulizer solution 600 mg, 3 mL, nebulization, 4x daily, Bill E Hipps, PA-C, 600 mg at 05/09/24 0706    [Held by provider] amLODIPine (Norvasc) tablet 10 mg, 10 mg, oral, Daily, Bill E Hipps, PA-C, 10 mg at 05/07/24 0952    artificial saliva (yerbas-lyt) aerosol,spray 5 mL, 5 mL, mucous membrane, q1h PRN, Alicia Whitehead, APRN-CNP    aspirin chewable tablet 81 mg, 81 mg, oral, Once, Bill Gallos, PA-C    atorvastatin (Lipitor) tablet 20 mg, 20 mg, oral, Nightly, Bill Gallos, PA-C, 20 mg at 05/08/24 2102    brimonidine (AlphaGAN) 0.2 % ophthalmic solution 1 drop, 1 drop, Right Eye, BID, Bill Gallos, PA-C, 1 drop at 05/08/24 2101    dextrose 50 % injection 12.5 g, 12.5 g, intravenous, q15 min PRN, Bill PINK Hipps, PA-C    dextrose 50 % injection 25 g, 25 g, intravenous, q15 min PRN, Bill E Hipps, PA-C    enoxaparin (Lovenox) syringe 40 mg, 40 mg, subcutaneous, Daily, Bill Gallos, PA-C, 40 mg at 05/07/24 0952    pantoprazole (ProtoNix) EC tablet 40 mg, 40 mg, oral, Daily **OR** esomeprazole (NexIUM) suspension 40 mg, 40 mg, nasoduodenal tube, Daily **OR** pantoprazole (ProtoNix) injection 40 mg, 40 mg, intravenous, Daily, TIERRA Reich-C, 40 mg at 05/08/24 0948    fentaNYL (Duragesic) 12 mcg/hr patch 1 patch, 1 patch, transdermal, q72h, Bill Lemons PA-C, 1 patch at 05/07/24 1425    fentaNYL (Duragesic) 50 mcg/hr patch 1 patch, 1 patch, transdermal, q72h, Bill Lemons PA-C, 1 patch at 05/07/24 1425    [Held by provider] furosemide (Lasix) injection 40 mg, 40 mg, intravenous, Daily, Bill Lemons PA-C, 40 mg at 05/07/24 0952    gabapentin (Neurontin) solution 250 mg, 250 mg, oral, BID, Andrew Agosto PA-C, 250 mg at 05/07/24 1643    gabapentin (Neurontin) solution 500 mg, 500 mg, oral, Nightly, Andrew Agosto PA-C, 500 mg at 05/07/24 2124    glucagon  (Glucagen) injection 1 mg, 1 mg, intramuscular, q15 min PRN, Bill Lemons, PA-C    glucagon (Glucagen) injection 1 mg, 1 mg, intramuscular, q15 min PRN, Bill Gallos, PA-C    hydrocodone-homatropine (Hycodan) 5-1.5 mg/5 mL syrup 5 mL, 5 mL, g-tube, q6h PRN, Alicia Whitehead APRN-CNP    HYDROmorphone (Dilaudid) injection 0.2 mg, 0.2 mg, intravenous, q6h PRN, Alicia Whitehead APRN-CNP, 0.2 mg at 05/09/24 0509    hydrOXYzine HCL (Atarax) tablet 25 mg, 25 mg, j-tube, q6h PRN, Bill Lemons, PA-C, 25 mg at 05/09/24 0509    insulin lispro (HumaLOG) injection 0-5 Units, 0-5 Units, subcutaneous, q4h, Bill Lemons, PA-C, 1 Units at 05/09/24 0807    ipratropium-albuteroL (Duo-Neb) 0.5-2.5 mg/3 mL nebulizer solution 3 mL, 3 mL, nebulization, q4h while awake, Bill Lemons, PA-C, 3 mL at 05/09/24 0706    levETIRAcetam (Keppra) 100 mg/mL solution 750 mg, 750 mg, g-tube, BID, Bill Lemons, PA-C, 750 mg at 05/08/24 2101    [Held by provider] lisinopril tablet 20 mg, 20 mg, oral, Daily, Bill Lemons, PA-C, 20 mg at 05/07/24 0952    methocarbamol (Robaxin) tablet 500 mg, 500 mg, g-tube, q8h PRN, Bill Gallos, PA-C, 500 mg at 05/08/24 2101    methylPREDNISolone sod succinate (SOLU-Medrol) 40 mg/mL injection 40 mg, 40 mg, intravenous, q8h, Andrew Agosto PA-C, 40 mg at 05/09/24 0506    midodrine (Proamatine) tablet 10 mg, 10 mg, oral, TID with meals, Andrew Agosto PA-C    norepinephrine (Levophed) 8 mg in dextrose 5% 250 mL (0.032 mg/mL) infusion (premix), 0.01-0.5 mcg/kg/min, intravenous, Continuous, Andrew Agosto PA-C, Stopped at 05/09/24 0600    ondansetron (Zofran) tablet 8 mg, 8 mg, g-tube, q8h PRN, Bill Lemons PA-C    oxygen (O2) therapy, , inhalation, Continuous - Inhalation, Bill Lemons PA-C, 30 L/min at 05/09/24 0800    oxygen (O2) therapy, , inhalation, Continuous - Inhalation, Sam Goddard MD    piperacillin-tazobactam-dextrose (Zosyn) IV 3.375 g, 3.375 g, intravenous, q6h, Bill Lemons PA-C, Stopped at  05/09/24 0536    polyethylene glycol (Glycolax, Miralax) packet 17 g, 17 g, oral, Daily, Bill E Hipps, PA-C, 17 g at 05/08/24 0925    potassium phosphates 15 mmol in sodium chloride 0.9% 250 mL IV, 15 mmol, intravenous, Once, Bill E Hipps, PA-C, Last Rate: 63.8 mL/hr at 05/09/24 0654, 15 mmol at 05/09/24 0654    sennosides-docusate sodium (Shivani-Colace) 8.6-50 mg per tablet 2 tablet, 2 tablet, g-tube, BID, Alicia Whitehead, APRN-CNP    thiamine (Vitamin B-1) tablet 100 mg, 100 mg, oral, Daily, Bill E Hipps, PA-C, 100 mg at 05/08/24 0926    vancomycin (Vancocin) pharmacy to dose - pharmacy monitoring, , miscellaneous, Daily PRN, Bill E Hipps, PA-C    vancomycin (Xellia) 1 g in 200 mL (Xellia) IVPB 1 g, 1 g, intravenous, q24h, Bill E Hipps, PA-C, Stopped at 05/09/24 0033    Review of Systems:  14 point review of systems was completed and negative except for those specially mention in my HPI    Physical Exam:    Heart Rate:  []   Temp:  [35.8 °C (96.4 °F)-36.9 °C (98.4 °F)]   Resp:  [7-42]   BP: ()/(49-85)   Weight:  [49.1 kg (108 lb 3.9 oz)]   SpO2:  [86 %-99 %]     Physical Exam  Constitutional:       General: She is awake. She is in acute distress.      Appearance: She is underweight. She is ill-appearing. She is not diaphoretic.   HENT:      Head: Normocephalic and atraumatic.      Mouth/Throat:      Mouth: Mucous membranes are dry.   Eyes:      Extraocular Movements: Extraocular movements intact.      Conjunctiva/sclera: Conjunctivae normal.      Pupils: Pupils are equal, round, and reactive to light.   Cardiovascular:      Rate and Rhythm: Regular rhythm. Tachycardia present.      Pulses: Normal pulses.   Pulmonary:      Effort: Tachypnea present. No respiratory distress.      Breath sounds: Examination of the right-lower field reveals decreased breath sounds. Examination of the left-lower field reveals decreased breath sounds. Decreased breath sounds present. No wheezing.   Abdominal:       Palpations: Abdomen is soft.      Tenderness: There is no abdominal tenderness.   Musculoskeletal:      Cervical back: Neck supple. No tenderness.      Right lower leg: No edema.      Left lower leg: No edema.   Skin:     General: Skin is warm.   Neurological:      Mental Status: She is alert and oriented to person, place, and time. Mental status is at baseline.   Psychiatric:         Behavior: Behavior is cooperative.         Objective:    I have reviewed all medications, laboratory results, and imaging pertinent for today's encounter.    FiO2 (%):  [40 %-50 %] 50 %      Intake/Output Summary (Last 24 hours) at 5/9/2024 0905  Last data filed at 5/9/2024 0700  Gross per 24 hour   Intake 769.35 ml   Output 700 ml   Net 69.35 ml         Assessment/Plan:    I am currently managing this critically ill patient for the following problems:    Neuro/Psych/Pain Ctrl/Sedation:  Cancer with mets to the brain s/p gamma knife   Anxiety disorder   - Continue neuro checks per protocol   - Pain control: continue home fentanyl patches, gabapentin, prn tylenol, prn robaxin    - Continue home Keppra   - Continue thiamine   - Hydroxyzine prn for anxiety   - CAM ICU score q-shift, sleep/wake cycle hygiene, delirium precaution     Respiratory/ENT:  Acute on chronic hypoxic respiratory failure - 2/2 pleural effusion, lung cancer, possible post-obstructive pneumonia   Hx of COPD, baseline O2 2L NC  - CPAP PRN  - HFNC during the day, wean as tolerated to maintain SpO2 >90%  - Continuous pulse ox monitoring   - Mucomyst and duonebs 4x daily   - Pulmonary on consult  - Continue solumedrol q8hrs   - IR consulted for thoracentesis   - Aggressive pulmonary/bronchial hygiene      Cardiovascular:  Hx of HTN  Hx of hyperlipidemia   Sinus tachycardia - worsens when in pain/coughing fits   - Continue home aspirin and atorvastatin   - Levo gtt discontinued last evening  - Hold home lisnopril, amlodipine, and lasix as pt has borderline BP   -  Continuous cardiac monitoring   - Goal MAP >65     GI:  Metastatic esophageal cancer s/p stenting   Hx of severe protein-calorie malnutrition   Hx of GERD   - Diet: NPO, continue home tube feeds   - Bowel regimen: senokot and miralax   - GI prophylaxis: continue home PPI  - PRN zofran for nausea   - Dietary consult      Renal/Volume Status (Intra & Extravascular):  Hx of CKD stage III  - Strict I&O monitoring   - Goal urine output 0.5-1.0 cc/kg/her  - Daily BMP, mag, phos  - Replace electrolytes per unit protocol     Endocrine  - POCT glucose q4h  - SSI insulin if indicated   - Monitor for hypo/hyperglycemia      Infectious Disease:  Concern of sepsis w/ unspecified source (possible pneumonia, UTI, peg tube)  UTI  - Continue vanc and zosyn   - MRSA pcr: negative   - Urine legionella and streptococcal antigens: negative   - Urine culture: klebsiella pneumoniae/variicola  - Blood cultures: no growth @ day 2   - Wound culture, discharge around peg tube   - Monitor WBC and temperature   - ID on consult      Heme/Onc:  Metastatic esophageal cancer   - Palliative consult for goals of care   - Daily CBC monitoring   - Transfuse for Hgb <7     Derm/MSK:  - ICU skin protocol   - Padded pressure points   - PT/OT eval and treat      Ethics/Code Status:  DNR, CCA- DNI - palliative care following for communication/GOC, will discuss with son and pt over next few days to see if transfer to hospice house is reasonable based on pts wishes.      :  DVT Prophylaxis: lovenox  GI Prophylaxis: PPI         Bowel Regimen: senna, miralax  Diet: NPO with enteral feeding thru peg  CVC: none  Randall: none  Zamarripa: none  Restraints: none  Dispo: downgrade     Critical Care Time:  65 minutes spent in preparing to see patient (I.e.labs,imaging, etc.), documentation, discussion plan of care with patient/family/caregiver, and/ or coordination of care with multidisciplinary team including the attending. Time does not include completion  of procedure time.     Plan discussed with Dr. Alberto.     CARMEN HungC  Pulmonology & Critical Care Medicine   [facility]

## 2024-05-09 NOTE — CARE PLAN
Problem: Respiratory  Goal: Clear secretions with interventions this shift  Outcome: Progressing  Goal: Minimize anxiety/maximize coping throughout shift  Outcome: Progressing  Goal: Minimal/no exertional discomfort or dyspnea this shift  Outcome: Progressing  Goal: No signs of respiratory distress (eg. Use of accessory muscles. Peds grunting)  Outcome: Progressing  Goal: Patent airway maintained this shift  Outcome: Progressing  Goal: Tolerate mechanical ventilation evidenced by VS/agitation level this shift  Outcome: Progressing  Goal: Tolerate pulmonary toileting this shift  Outcome: Progressing  Goal: Verbalize decreased shortness of breath this shift  Outcome: Progressing  Goal: Wean oxygen to maintain O2 saturation per order/standard this shift  Outcome: Progressing  Goal: Increase self care and/or family involvement in next 24 hours  Outcome: Progressing     Problem: Skin  Goal: Decreased wound size/increased tissue granulation at next dressing change  Outcome: Progressing  Goal: Participates in plan/prevention/treatment measures  Outcome: Progressing  Goal: Prevent/manage excess moisture  Outcome: Progressing  Goal: Prevent/minimize sheer/friction injuries  Outcome: Progressing  Goal: Promote/optimize nutrition  Outcome: Progressing  Goal: Promote skin healing  Outcome: Progressing     Problem: Fall/Injury  Goal: Not fall by end of shift  Outcome: Progressing  Goal: Be free from injury by end of the shift  Outcome: Progressing  Goal: Verbalize understanding of personal risk factors for fall in the hospital  Outcome: Progressing  Goal: Verbalize understanding of risk factor reduction measures to prevent injury from fall in the home  Outcome: Progressing  Goal: Use assistive devices by end of the shift  Outcome: Progressing  Goal: Pace activities to prevent fatigue by end of the shift  Outcome: Progressing   The patient's goals for the shift include breath better    The clinical goals for the shift include  maintain oxygen levels

## 2024-05-09 NOTE — PROGRESS NOTES
Pulmonary Progress Note 05/09/24     Patient seen in follow-up for acute respiratory failure with hypoxia, lung cancer, likely malignant bilateral pleural effusions and left upper lobe collapse    Subjective   Interval History:   Remains on norepinephrine.  Remains on Vapotherm.  Did not tolerate reducing from 50%.  Endorses a cough.  Breathing is fair.  Does not like CPAP mask.    Objective   Vital signs in last 24 hours:  Temp:  [35.8 °C (96.4 °F)-36.9 °C (98.4 °F)] 36.7 °C (98.1 °F)  Heart Rate:  [] 101  Resp:  [7-42] 35  BP: ()/(49-85) 84/62  FiO2 (%):  [40 %-50 %] 50 %    Intake/Output last 3 shifts:  I/O last 3 completed shifts:  In: 1319.4 (26.9 mL/kg) [I.V.:9.4 (0.2 mL/kg); Blood:250; NG/GT:610; IV Piggyback:450]  Out: 800 (16.3 mL/kg) [Urine:800 (0.5 mL/kg/hr)]  Weight: 49.1 kg   Intake/Output this shift:  I/O this shift:  In: -   Out: 250 [Urine:250]    Physical Exam  Vitals reviewed.   Constitutional:       Appearance: She is ill-appearing. She is not toxic-appearing.      Comments: Vapotherm   Cardiovascular:      Rate and Rhythm: Normal rate and regular rhythm.   Pulmonary:      Effort: Pulmonary effort is normal.      Breath sounds: Examination of the right-lower field reveals decreased breath sounds. Examination of the left-lower field reveals decreased breath sounds. Decreased breath sounds present. No wheezing.      Comments: Scanned bilateral hemithoraces which again showed similar findings compared with yesterday including moderate to moderately small left-sided effusion, small right-sided effusion.  With windows available, both appear simple  Musculoskeletal:      Right lower leg: No edema.      Left lower leg: No edema.   Skin:     General: Skin is warm and dry.   Neurological:      General: No focal deficit present.      Mental Status: She is alert.   Psychiatric:         Mood and Affect: Mood normal.         Behavior: Behavior normal.         Scheduled  medications  acetylcysteine, 3 mL, nebulization, 4x daily  [Held by provider] amLODIPine, 10 mg, oral, Daily  aspirin, 81 mg, oral, Once  atorvastatin, 20 mg, oral, Nightly  brimonidine, 1 drop, Right Eye, BID  enoxaparin, 40 mg, subcutaneous, Daily  pantoprazole, 40 mg, oral, Daily   Or  esomeprazole, 40 mg, nasoduodenal tube, Daily   Or  pantoprazole, 40 mg, intravenous, Daily  fentaNYL, 1 patch, transdermal, q72h  fentaNYL, 1 patch, transdermal, q72h  [Held by provider] furosemide, 40 mg, intravenous, Daily  gabapentin, 250 mg, oral, BID  gabapentin, 500 mg, oral, Nightly  insulin lispro, 0-5 Units, subcutaneous, q4h  ipratropium-albuteroL, 3 mL, nebulization, q4h while awake  levETIRAcetam, 750 mg, g-tube, BID  [Held by provider] lisinopril, 20 mg, oral, Daily  methylPREDNISolone sodium succinate (PF), 40 mg, intravenous, q8h  midodrine, 10 mg, oral, TID with meals  oxygen, , inhalation, Continuous - Inhalation  oxygen, , inhalation, Continuous - Inhalation  piperacillin-tazobactam, 3.375 g, intravenous, q6h  polyethylene glycol, 17 g, oral, Daily  potassium phosphate, 15 mmol, intravenous, Once  sennosides-docusate sodium, 2 tablet, g-tube, BID  thiamine, 100 mg, oral, Daily  vancomycin (Xellia) 1 g in 200 mL, 1 g, intravenous, q24h      Continuous medications       PRN medications  PRN medications: acetaminophen **OR** acetaminophen **OR** acetaminophen, artificial saliva (yerbas-lyt), dextrose, dextrose, glucagon, glucagon, hydrocodone-homatropine, HYDROmorphone, hydrOXYzine HCL, methocarbamol, ondansetron, vancomycin     Labs:  Lab Results   Component Value Date     05/09/2024    K 3.7 05/09/2024     05/09/2024    CO2 22 (L) 05/09/2024    BUN 26 (H) 05/09/2024    CREATININE 0.80 05/09/2024    GLUCOSE 175 (H) 05/09/2024    CALCIUM 10.7 (H) 05/09/2024     Lab Results   Component Value Date    WBC 8.6 05/09/2024    HGB 9.1 (L) 05/09/2024    HCT 29.6 (L) 05/09/2024    MCV 81 05/09/2024      05/09/2024       Imaging:  XR chest 1 view    Result Date: 5/8/2024  Interpreted By:  Russell Escalante, STUDY: XR CHEST 1 VIEW;  5/7/2024 10:18 pm   INDICATION: Signs/Symptoms:hypoxemia.   COMPARISON: CXR 05/07/2024, CT chest 05/07/2024   ACCESSION NUMBER(S): [id number]   ORDERING CLINICIAN: SERA SMITH   FINDINGS: There is worsening consolidation within the right upper lobe, right middle lobe. Similar consolidation in the right lower lobe. There is redemonstration of a prominent left basilar opacity representing sub pulmonic effusion. There is redemonstration of complete left upper lobe collapse and left hilar mass representing locally invasive esophageal cancer and malignant lymphadenopathy. No pneumothorax.       Please see above.     MACRO: None.   Signed by: Russell Escalante 5/8/2024 1:28 AM Dictation workstation:   [id number]    CT chest w IV contrast    Result Date: 5/7/2024  Interpreted By:  Chaitanya Lovell, STUDY: CT CHEST W IV CONTRAST;  5/7/2024 4:25 am   INDICATION: Signs/Symptoms:pleural effusion.   COMPARISON: 05/13/2022   ACCESSION NUMBER(S): [id number]   ORDERING CLINICIAN: JENN NEWMAN   TECHNIQUE: Helical data acquisition of the chest was obtained without intravenous contrast. Images were reformatted in axial, coronal, and sagittal planes.   FINDINGS: LOWER NECK AND CHEST WALL:  Right chest port catheter.   MEDIASTINUM/REGINE:No lymphadenopathy. Metallic esophageal stent in place. Trace debris within the stent lumen. Confluent soft tissue density within the posterior paratracheal and paraesophageal mediastinum may reflect confluence of adenopathy or locally advanced malignancy.   CARDIOVASCULAR:  Cardiac chamber size within normal limits. Small pericardial effusion. Right chest port catheter tip terminating at the SVC/RA junction. Aortic caliber normal. Normal caliber of main pulmonary artery. Scattered coronary atherosclerotic calcification.   LUNGS, AIRWAYS, AND PLEURA:   Severe focal narrowing of the left mainstem bronchus. There is complete occlusion/filling of the majority of the left lower lobar and left lower lobe segmental bronchi. Moderate bilateral pleural effusions. Moderate emphysema. Patchy ground-glass opacities throughout the right lung and dependent right lower lobe consolidation may reflect an infectious/inflammatory process. Somewhat nodular interlobular septal thickening at the medial right lung base may reflect lymphangitic carcinomatosis. There are spiculated pulmonary nodules within the left upper lobe measuring 2.1 cm, right upper lobe measuring 1.3 cm, and posteroinferior right lower lobe measuring 1.5 cm as well as the superior segment of the right lower lobe measuring 1.1 cm. There is complete collapse of the left upper lobe and complete filling of the left upper lobar bronchi.   MUSCULOSKELETAL: No acute osseous abnormality or suspicious osseous lesions. Mild multilevel spinal degenerative change.   UPPER ABDOMEN: Unremarkable.       1. Esophageal stent in place with soft tissue attenuation material in the posterior mediastinum surrounding the esophagus and central airways, likely reflecting known locally advanced esophageal squamous cell carcinoma with possible additional superimposed confluent adenopathy. There is complete narrowing/filling of the left upper lobe airway with collapse/consolidation of the left upper lobe. There is partial filling/narrowing of the left lower lobe airway with extensive material filling the central bronchi of the left lower lobe. Extensive patchy ground-glass/consolidative opacities within the right lung concerning for an infectious/inflammatory process. 2. Moderate bilateral pleural effusions. 3. Spiculated bilateral pulmonary nodules, likely metastatic. 4. Small pericardial effusion. 5. Somewhat nodular interlobular septal thickening at the medial right lung base may reflect lymphangitic carcinomatosis.   Signed by: Chaitanya  Liliya 5/7/2024 5:02 AM Dictation workstation:   [id number]    XR chest 1 view    Result Date: 5/7/2024  Interpreted By:  Russell Escalante, STUDY: XR CHEST 1 VIEW;  5/7/2024 2:29 am   INDICATION: Signs/Symptoms:Chest Pain.   COMPARISON: 04/06/2017   ACCESSION NUMBER(S): [id number]   ORDERING CLINICIAN: JENN NEWMAN   FINDINGS: There is an esophageal stent. There is a right chest port terminating over the cavoatrial junction.   There is a large left pleural effusion. There is new bilateral hilar enlargement and nodularity likely representing lymphadenopathy. There is left hemithorax volume loss. There is airspace disease at the left lung base. There is diffuse peribronchovascular and interstitial prominence. No pneumothorax.       Multiple cardiopulmonary abnormalities including probable pulmonary edema, large left pleural effusion, bilateral hilar and mediastinal lymphadenopathy and probable airway obstruction on the left resulting in volume loss of the left hemithorax. CT chest with contrast is recommended for further evaluation.   Esophageal stent consistent with esophageal cancer.   MACRO: None.   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 M: CTBWO_3 CT Dose-Length Product (DLP): 1208  mGy*cm CT Dose Reduction Employed: Automated exposure control (AEC) COMPARISON:  CT brain 04/13/2024.  MRI brain 04/12/2024. RESULT: Acute change:  No evidence of an acute intracranial process. Hemorrhage:  No evidence of acute intracranial hemorrhage. Mass Lesion / Mass Effect: Again noted  is the low attenuating mass lesion in the left medial parietal lobe with confluent surrounding vasogenic edema, intracranial metastasis, better delineated on the prior MR brain 04/12/2024.  Otherwise, no midline shift or herniation. Chronic change:  Patchy nonspecific supratentorial white matter changes likely reflecting chronic microvascular ischemia. Parenchyma:  Mild generalized parenchymal volume loss. Ventricles:  Commensurate with volume loss. Other:  The calvarium, skull base, imaged paranasal sinuses, mastoids, orbits and extracranial soft tissues are unremarkable.  Stereotactic frame is in place.  (topogram) images:  No additional findings.    IMPRESSION: Localization exam.  No acute intracranial process. : VEENA   Transcribe Date/Time: Apr 16 2024 11:46A Dictated by : KAREY AVELAR DO This examination was interpreted and the report reviewed and electronically signed by: KAREY AVELAR DO on Apr 16 2024 11:49AM  EST    CT BRAIN STEREOLOCAL WO IVCON    Result Date: 4/13/2024  * * *Final Report* * * DATE OF EXAM: Apr 13 2024  2:31PM   [facility] [place]   0503  -  CT BRAIN STEREOLOCAL WO IVCON  / ACCESSION #  [id number] PROCEDURE REASON: Other (document in comments)      * * * * Physician Interpretation * * * *  EXAMINATION:  CT STEREOLOCALIZATION BRAIN WITH CONTRAST HISTORY:  63 year old female with left parietal mass, preoperative stereo localization exam. TECHNIQUE: CT head high-resolution stereotactic localization without contrast. M: CTBWO_3 CT Dose-Length Product (DLP): 1336  mGy*cm CT Dose Reduction Employed: Automated exposure control (AEC) COMPARISON:  MR brain with and without contrast 04/12/2024. RESULT: The patient's known left parietal mass lesion is better demonstrated on the 04/12/2024 MRI with and without contrast.  There is surrounding vasogenic edema.  Mild local mass effect with partial effacement of the left occipital horn and atrium of the left lateral ventricle. No acute infarct or  hemorrhage.  No midline shift or abnormal extra-axial fluid collection.  Scattered white matter hypoattenuation, nonspecific, however likely representing sequela of prior chronic microvascular ischemia.  Mild to moderate generalized parenchymal volume loss with commensurate prominence of the cortical sulci and ventricles.  Partial left lateral ventricular effacement, as above.  No hydrocephalus or ventricular trapping. Paranasal sinuses are clear.  Mastoid air cells and middle ear cavities are clear bilaterally.  Bilateral orbits are within normal limits.   Overlying soft tissues demonstrate no focal abnormality.  No destructive calvarial lesion.  Patient is edentulous.    IMPRESSION: Stereotactic localization examination. Known left parietal lesion is better demonstrated on the 04/12/2024 MRI with and without contrast. No acute hemorrhage or large territorial infarct. : PSCB   Transcribe Date/Time: Apr 13 2024  2:33P Dictated by : RAJ DELGADO MD This examination was interpreted and the report reviewed and electronically signed by: ROBERT HAMPTON MD on Apr 13 2024  2:50PM  EST    FL enema single contrast water soluble    Result Date: 4/12/2024  * * *Final Report* * * DATE OF EXAM: Apr 12 2024  2:28PM   [facility]   [id number]  -  XR COLON SINGLE CONTRAST  / ACCESSION #  [id number] PROCEDURE REASON: Assess for fistula      * * * * Physician Interpretation * * * *  WATER SOLUBLE CONTRAST ENEMA HISTORY: Assess for rectovaginal fistula. COMPARISON: CT abdomen and pelvis 02/27/2024 TECHNIQUE: Water soluble-contrast enema was performed after insertion of a rectal tube.  Spot and overhead images were obtained. Contrast: RECTAL:  400 ml of OMNIPAQUE 300 Fluoroscopy radiation summary: Fluoroscopy time: 1:30 (min:sec). Air kerma: 80.5 mGy. RESULT: : No dilated loops of bowel.  Bilateral pelvic tubal ligation clips.  Calcified uterine fibroids. Contrast refluxes to the level of the mid sigmoid colon.  There is no  communication with the vagina.  Filling defects in the opacified colon consistent with feces. Examination ended due to patient discomfort and difficulty retaining the contrast material. The study was performed by CHANCE Sal, under the supervision of Dr. Kaiser, who was present for the critical portion of the exam.   Images associated with this study were submitted for interpretation and reviewed by Dr. Kaiser. ===========    IMPRESSION: NO DEMONSTRATED RECTOVAGINAL FISTULA. : PSCB   Transcribe Date/Time: Apr 12 2024  2:38P Dictated by : CHANCE SAL This examination was interpreted and the report reviewed and electronically signed by: MABEL KAISER MD on Apr 12 2024  3:25PM  EST    MR brain w and wo IV contrast    Result Date: 4/12/2024  * * *Final Report* * * DATE OF EXAM: Apr 12 2024  5:39AM   [facility]   0295  -  MRI BRAIN WO/W IVCON  / ACCESSION #  [id number] PROCEDURE REASON: Metastatic disease evaluation      * * * * Physician Interpretation * * * *  EXAMINATION:  MRI BRAIN WO/W IVCON CLINICAL HISTORY: Gamma knife protocol preop localization exam. TECHNIQUE:  Limited Gamma knife protocol coronal T1 pre and post contrast, axial postcontrast T1, axial T2 sequence. Contrast:  11 mL Dotarem [facility] IV COMPARISON: MRI of yesterday RESULT: Mass Lesion/ Mass Effect:    Peripherally enhancing cystic and solid right parietal mass measuring approximately 3 x 3 x 3 cm with more heterogenous solid components along its lateral margin.  Moderate surrounding vasogenic edema and mild locoregional mass effect.  No midline shift. No other abnormal enhancing brain lesions. Chronic Change:  Unchanged burden of supra and infratentorial chronic small vessel change. Parenchyma:   No significant volume loss for age. Ventricles:     Normal caliber and morphology. Vasculature:    Major intracranial arterial structures, and dural venous sinuses show typical flow void, suggesting patency by spin echo  criteria. Other:  The visualized paranasal sinuses and mastoid air cells are clear.  The orbits and extracranial soft tissues are unremarkable.    IMPRESSION: Unchanged left parietal cystic and solid mass most compatible with metastasis.  No other enhancing brain lesions. : PSCB   Transcribe Date/Time: Apr 12 2024  6:13A Dictated by : ADIN DARBY MD This examination was interpreted and the report reviewed and electronically signed by: ADIN DARBY MD on Apr 12 2024  6:30AM  EST    MR brain w and wo IV contrast    Result Date: 4/11/2024  * * *Final Report* * * DATE OF EXAM: Apr 10 2024 11:48PM   [facility]   0295  -  MRI BRAIN WO/W IVCON  / ACCESSION #  [id number] PROCEDURE REASON: Brain/CNS neoplasm, monitor      * * * * Physician Interpretation * * * *  EXAMINATION:  MRI BRAIN WO IVCON CLINICAL HISTORY: Brain CNS neoplasm. TECHNIQUE:  Routine brain MRI protocol without and with contrast including diffusion images. MQ:  MRBWOW_2 Contrast: None COMPARISON: CT brain 04/09/2024 RESULT: Truncated exam secondary to patient tolerance.  No postcontrast sequences are available. Acute Change:   There is no evidence of restricted diffusion to suggest an acute infarct.  There is some restricted diffusion around the periphery of the left parietal mass. Hemorrhage:    No evidence of prior parenchymal hemorrhage on the gradient echo images. Mass Lesion/ Mass Effect: 3 cm left parietal mass and moderate surrounding vasogenic edema. Chronic Change:  Scattered punctate foci of increased T2 and FLAIR signal are noted in the supratentorial and infratentorial white matter which is a nonspecific finding, but likely represents mild to moderate chronic microvascular ischemia. Parenchyma:   No significant volume loss for age. Ventricles:     Normal caliber and morphology. Skull Base:    Hypothalamic and pituitary region are grossly normal.   Craniocervical junction is normal. No significant marrow replacement process. Vasculature:     Major intracranial arterial structures, and dural venous sinuses show typical flow void, suggesting patency by spin echo criteria. Other:  No abnormal signal in the sinuses or mastoids.  The orbits and extracranial soft tissues are unremarkable.    IMPRESSION: Left parietal mass and surrounding vasogenic edema are unchanged from the CT of yesterday allowing for differences in modality. Truncated exam secondary to patient tolerance with no postcontrast imaging limiting evaluation for additional metastatic disease. : VEENA   Transcribe Date/Time: Apr 11 2024 12:27A Dictated by : ADIN DARBY MD This examination was interpreted and the report reviewed and electronically signed by: ADIN DARBY MD on Apr 11 2024 12:40AM  EST    XR chest 1 view    Result Date: 4/10/2024  * * *Final Report* * * DATE OF EXAM: Apr 9 2024 11:00PM   [facility]   [id number]  -  XR CHEST 1V FRONTAL PORT  / ACCESSION #  [id number] PROCEDURE REASON: Shortness of breath      * * * * Physician Interpretation * * * *  EXAMINATION:  CHEST RADIOGRAPH (PORTABLE SINGLE VIEW AP) Exam Date/Time:  4/9/2024 11:00 PM Clinical History: Shortness of breath MQ:  XCPMC_6 Comparison:  Earlier the same day. RESULT: Lines, tubes, and devices:  Stable right-sided Port-A-Cath and esophageal stent.  Lungs and pleura:  No significant change of small left pleural effusion and associated basilar atelectasis.  Stable left parahilar opacity also related to atelectasis..  No pneumothorax.  Known metastatic lung nodules at the seen on prior chest CT.  No pneumothorax. Cardiomediastinal silhouette:  Stable cardiomediastinal silhouette. Other:  .    IMPRESSION: See result. : VEENA   Transcribe Date/Time: Apr 10 2024  7:11A Dictated by : SERENE HELM MD This examination was interpreted and the report reviewed and electronically signed by: SERENE HELM MD on Apr 10 2024  7:16AM  EST    CT angio chest w and wo IV contrast    Result Date: 4/9/2024  * *  *Final Report* * * DATE OF EXAM: Apr 9 2024 10:53AM   [facility]   0540  -  CT CHEST W IVCON PE  / ACCESSION #  [id number] PROCEDURE REASON: Pulmonary embolism (PE) suspected, high prob      * * * * Physician Interpretation * * * * RESULT: EXAMINATION:  CHEST CT WITH CONTRAST (PULMONARY EMBOLISM PROTOCOL) CLINICAL HISTORY: Pulmonary embolus suspected Technique:  Spiral CT acquisition of the chest from the thoracic inlet to the upper abdomen following IV contrast.  Axial 1 and 3 mm thick slices plus coronal and sagittal reformatted images. MQ:  CTCP_5 Contrast: mL Omnipaque 300 IV CT Radiation dose: Integrated Dose-length product (DLP) for this visit = mGy*cm CT Dose Reduction Employed: Automated exposure control(AEC) and iterative recon Comparison:  CT chest 2/27/2024 RESULT: Limitations:  Some respiratory motion. Evaluation for thromboembolic disease:      - Right heart chambers:  No thromboembolic disease.      - Main pulmonary arteries:  No thromboembolic disease.      - Lobar pulmonary arteries:  No thromboembolic disease.      - Segmental pulmonary arteries:  Limited due to motion in some areas.      - Subsegmental pulmonary arteries:  Limited due to motion in some areas      - Additional pulmonary artery findings:  The main pulmonary artery is normal in caliber. Lines, tubes, and devices:  Esophageal stent is situated in the mid/lower esophagus. Lung parenchyma and airways: Increased narrowing of the left main bronchus with occlusion of the left upper lobe bronchus and marked narrowing of the left lower lobe bronchus which is increased compared to prior. New lobar atelectasis of the left upper lobe/lingula. Numerous bilateral pulmonary metastases, the largest of which measures up to 2.2 cm are unchanged. Unable to assess the metastases in the atelectatic left upper lobe/lingula. Paramediastinal lung changes, presumably due to prior radiation therapy, similar to prior. There is again pulmonary emphysema. Pleural  space:  Small left pleural effusion. Lower neck, lymph nodes, and mediastinum:  There is again esophageal stent. There is again soft tissue thickening along the left side of the mediastinum (series 8 image 97), which extends to the hilar region and causes worsened narrowing of the left main bronchus/left lower lobe bronchus and occlusion of the left upper lobe bronchus. The amount of soft tissue appears more prominent compared to prior but is difficult to measure. Heart, pericardium, and thoracic vessels:  The heart is not enlarged.   There is a small pericardial effusion.  No thoracic aortic aneurysm.   Atheromatous calcification of the aorta. Bones and soft tissues:  Degenerative changes of the spine. Upper abdomen:  Water density lesion posterior right hepatic lobe. Localizer images: No additional findings.    IMPRESSION: Evaluation of some of the small peripheral pulmonary arteries is limited by respiratory motion. No definite CT evidence of pulmonary embolus otherwise. Increased amount of mediastinal soft tissue which encases the left-sided bronchi with occlusion of the left upper lobe bronchus and increased narrowing of the left main and left lower lobe bronchi. New complete atelectasis of the left upper lobe. The visualized bilateral pulmonary metastases are grossly unchanged. Small left pleural effusion. Small pericardial effusion. Transcribed Using Voice Recognition Transcribe Date/Time: Apr 9 2024 11:41A Dictated by: JO CORDOVA MD This examination was interpreted and the report reviewed and electronically signed by: JO CORDOVA MD on Apr 9 2024 12:07PM  EST    CT head wo IV contrast    Result Date: 4/9/2024  * * *Final Report* * * DATE OF EXAM: Apr 9 2024 10:52AM   [facility]   0504  -  CT BRAIN WO IVCON  / ACCESSION #  [id number] PROCEDURE REASON: Seizure, focal (Ped 0-18y)      * * * * Physician Interpretation * * * * RESULT: EXAMINATION: CT BRAIN WO IVCON CLINICAL HISTORY: Altered mental status, seizure  TECHNIQUE:  Serial axial images without IV contrast were obtained from the vertex to the foramen magnum. MQ:  CTBWO_3 CT Radiation dose: Integrated Dose-Length Product (DLP) for this visit =   1187  mGy*cm CT Dose Reduction Employed: Automated exposure control(AEC) and iterative recon COMPARISON: Head CT 10/27/2023 RESULT: Post-operative change: None. Acute change: No evidence of an acute infarct or other acute parenchymal process. Hemorrhage: No evidence of acute intracranial hemorrhage. ECASS hemorrhagic transformation score: Not Applicable Mass Lesion / Mass Effect: There is a new 3 cm left parietal mass with localized sulcal effacement and surrounding vasogenic edema. Chronic change: Scattered patchy foci of low attenuation are present within supratentorial white matter which is a nonspecific finding but likely represents mild microvascular ischemia. Parenchyma: There is mild generalized volume loss.  The brain parenchyma is otherwise within normal limits for age. Ventricles: Ventricular enlargement concordant with the degree of parenchymal volume loss. Paranasal sinuses and skull base: The visualized paranasal sinuses are grossly clear.   The skull base and imaged soft tissues are unremarkable. Localizer images: No additional findings.    IMPRESSION: Left parietal mass new from 10/27/2023, with surrounding vasogenic edema and localized mass effect. COMMUNICATION:  Communicated with DR ALFONZO ELIZABETH on 4/9/2024 11:12 AM  via verbal communication. Transcribed Using Voice Recognition Transcribe Date/Time: Apr 9 2024 11:05A Dictated by: NYDIA MADRIGAL MD This examination was interpreted and the report reviewed and electronically signed by: NYDIA MADRIGAL MD on Apr 9 2024 11:16AM  EST    XR chest 1 view    Result Date: 4/9/2024  * * *Final Report* * * DATE OF EXAM: Apr 9 2024  8:13AM   EUX   [id number]  -  XR CHEST 1V FRONTAL PORT  / ACCESSION #  [id number] PROCEDURE REASON: Shortness of breath      * * * *  Physician Interpretation * * * * RESULT: EXAMINATION:  CHEST RADIOGRAPH (PORTABLE SINGLE VIEW AP) Exam Date/Time:  4/9/2024 8:13 AM CLINICAL HISTORY: Shortness of breath MQ:  XCPR_5 Comparison:  02/29/2024 RESULT: Lines, tubes, and devices:  Right IJ port, tip terminating at the cavoatrial junction.  The esophageal stent with no significant change in position, superior margin at the level of the clavicles. Lungs and pleura:  Mild left basilar opacities, likely subsegmental atelectasis.  No substantial pleural effusion or pneumothorax. Cardiomediastinal silhouette:  Stable cardiomediastinal silhouette with masslike soft tissue prominence from the level of the aortic arch to the izabela.    IMPRESSION: See result. Transcribed Using Voice Recognition Transcribe Date/Time: Apr 9 2024  8:16A Dictated by: NYDIA MADRIGAL MD This examination was interpreted and the report reviewed and electronically signed by: NYDIA MADRIGAL MD on Apr 9 2024  8:20AM  EST              Assessment/Plan   Principal Problem:    Sepsis, due to unspecified organism, unspecified whether acute organ dysfunction present (Multi)  Active Problems:    Acute respiratory failure with hypoxia (Multi)    Pleural effusion, bilateral    Esophageal cancer (Multi)    Acute on chronic respiratory failure with hypoxia: Likely in the setting of left upper lobe collapse, advanced metastatic esophageal carcinoma, left upper lobe collapse  Continue with Vapotherm, wean as tolerated  Left upper lobe collapse/postobstructive pneumonia  Continue with empiric antibiotics pending further cultures  Bronchodilators  Bilateral pleural effusions: Again I do not believe contributing significantly with regard to her oxygen data of breathing.  Will maintain order for IR thoracentesis on the left lung  Poor prognosis.      Will continue to follow       LOS: 2 days       Aldair Tamayo MD  Pulmonary/Critical Care medicine

## 2024-05-09 NOTE — CARE PLAN
The patient's goals for the shift include breath better    The clinical goals for the shift include maintain oxygen levels      Problem: Respiratory  Goal: Clear secretions with interventions this shift  Outcome: Progressing  Goal: Minimize anxiety/maximize coping throughout shift  Outcome: Progressing  Goal: Minimal/no exertional discomfort or dyspnea this shift  Outcome: Progressing  Goal: No signs of respiratory distress (eg. Use of accessory muscles. Peds grunting)  Outcome: Progressing  Goal: Patent airway maintained this shift  Outcome: Progressing  Goal: Tolerate mechanical ventilation evidenced by VS/agitation level this shift  Outcome: Progressing  Goal: Tolerate pulmonary toileting this shift  Outcome: Progressing  Goal: Verbalize decreased shortness of breath this shift  Outcome: Progressing  Goal: Wean oxygen to maintain O2 saturation per order/standard this shift  Outcome: Progressing  Goal: Increase self care and/or family involvement in next 24 hours  Outcome: Progressing     Problem: Skin  Goal: Decreased wound size/increased tissue granulation at next dressing change  Outcome: Progressing  Flowsheets (Taken 5/9/2024 1237)  Decreased wound size/increased tissue granulation at next dressing change:   Promote sleep for wound healing   Protective dressings over bony prominences  Goal: Participates in plan/prevention/treatment measures  Outcome: Progressing  Flowsheets (Taken 5/9/2024 1239)  Participates in plan/prevention/treatment measures:   Discuss with provider PT/OT consult   Increase activity/out of bed for meals  Goal: Prevent/manage excess moisture  Outcome: Progressing  Flowsheets (Taken 5/9/2024 1239)  Prevent/manage excess moisture:   Cleanse incontinence/protect with barrier cream   Follow provider orders for dressing changes   Monitor for/manage infection if present  Goal: Prevent/minimize sheer/friction injuries  Outcome: Progressing  Flowsheets (Taken 5/9/2024 1238)  Prevent/minimize  sheer/friction injuries:   Increase activity/out of bed for meals   HOB 30 degrees or less   Turn/reposition every 2 hours/use positioning/transfer devices  Goal: Promote/optimize nutrition  Outcome: Progressing  Flowsheets (Taken 5/9/2024 1237)  Promote/optimize nutrition:   Monitor/record intake including meals   Consume > 50% meals/supplements  Goal: Promote skin healing  Outcome: Progressing  Flowsheets (Taken 5/9/2024 1236)  Promote skin healing:   Turn/reposition every 2 hours/use positioning/transfer devices   Protective dressings over bony prominences     Problem: Fall/Injury  Goal: Not fall by end of shift  Outcome: Progressing  Goal: Be free from injury by end of the shift  Outcome: Progressing  Goal: Verbalize understanding of personal risk factors for fall in the hospital  Outcome: Progressing  Goal: Verbalize understanding of risk factor reduction measures to prevent injury from fall in the home  Outcome: Progressing  Goal: Use assistive devices by end of the shift  Outcome: Progressing  Goal: Pace activities to prevent fatigue by end of the shift  Outcome: Progressing

## 2024-05-10 LAB
ANION GAP SERPL CALC-SCNC: 12 MMOL/L
BASOPHILS # BLD AUTO: 0.03 X10*3/UL (ref 0–0.1)
BASOPHILS NFR BLD AUTO: 0.2 %
BUN SERPL-MCNC: 24 MG/DL (ref 8–25)
CALCIUM SERPL-MCNC: 10.8 MG/DL (ref 8.5–10.4)
CHLORIDE SERPL-SCNC: 102 MMOL/L (ref 97–107)
CO2 SERPL-SCNC: 22 MMOL/L (ref 24–31)
CREAT SERPL-MCNC: 0.9 MG/DL (ref 0.4–1.6)
EGFRCR SERPLBLD CKD-EPI 2021: 72 ML/MIN/1.73M*2
EOSINOPHIL # BLD AUTO: 0 X10*3/UL (ref 0–0.7)
EOSINOPHIL NFR BLD AUTO: 0 %
ERYTHROCYTE [DISTWIDTH] IN BLOOD BY AUTOMATED COUNT: 18.5 % (ref 11.5–14.5)
GLUCOSE BLD MANUAL STRIP-MCNC: 110 MG/DL (ref 74–99)
GLUCOSE BLD MANUAL STRIP-MCNC: 113 MG/DL (ref 74–99)
GLUCOSE BLD MANUAL STRIP-MCNC: 121 MG/DL (ref 74–99)
GLUCOSE BLD MANUAL STRIP-MCNC: 125 MG/DL (ref 74–99)
GLUCOSE BLD MANUAL STRIP-MCNC: 136 MG/DL (ref 74–99)
GLUCOSE BLD MANUAL STRIP-MCNC: 138 MG/DL (ref 74–99)
GLUCOSE BLD MANUAL STRIP-MCNC: 174 MG/DL (ref 74–99)
GLUCOSE SERPL-MCNC: 134 MG/DL (ref 65–99)
HCT VFR BLD AUTO: 30.2 % (ref 36–46)
HGB BLD-MCNC: 9.2 G/DL (ref 12–16)
IMM GRANULOCYTES # BLD AUTO: 0.1 X10*3/UL (ref 0–0.7)
IMM GRANULOCYTES NFR BLD AUTO: 0.7 % (ref 0–0.9)
LYMPHOCYTES # BLD AUTO: 0.57 X10*3/UL (ref 1.2–4.8)
LYMPHOCYTES NFR BLD AUTO: 3.9 %
MAGNESIUM SERPL-MCNC: 2 MG/DL (ref 1.6–3.1)
MCH RBC QN AUTO: 24.5 PG (ref 26–34)
MCHC RBC AUTO-ENTMCNC: 30.5 G/DL (ref 32–36)
MCV RBC AUTO: 81 FL (ref 80–100)
MONOCYTES # BLD AUTO: 0.74 X10*3/UL (ref 0.1–1)
MONOCYTES NFR BLD AUTO: 5.1 %
NEUTROPHILS # BLD AUTO: 13.15 X10*3/UL (ref 1.2–7.7)
NEUTROPHILS NFR BLD AUTO: 90.1 %
NRBC BLD-RTO: 0.1 /100 WBCS (ref 0–0)
PHOSPHATE SERPL-MCNC: 2.2 MG/DL (ref 2.5–4.5)
PLATELET # BLD AUTO: 362 X10*3/UL (ref 150–450)
POTASSIUM SERPL-SCNC: 4.3 MMOL/L (ref 3.4–5.1)
RBC # BLD AUTO: 3.75 X10*6/UL (ref 4–5.2)
SODIUM SERPL-SCNC: 136 MMOL/L (ref 133–145)
WBC # BLD AUTO: 14.6 X10*3/UL (ref 4.4–11.3)

## 2024-05-10 PROCEDURE — 83735 ASSAY OF MAGNESIUM: CPT

## 2024-05-10 PROCEDURE — 2500000004 HC RX 250 GENERAL PHARMACY W/ HCPCS (ALT 636 FOR OP/ED)

## 2024-05-10 PROCEDURE — 2500000004 HC RX 250 GENERAL PHARMACY W/ HCPCS (ALT 636 FOR OP/ED): Mod: JZ

## 2024-05-10 PROCEDURE — 2500000002 HC RX 250 W HCPCS SELF ADMINISTERED DRUGS (ALT 637 FOR MEDICARE OP, ALT 636 FOR OP/ED)

## 2024-05-10 PROCEDURE — 2500000001 HC RX 250 WO HCPCS SELF ADMINISTERED DRUGS (ALT 637 FOR MEDICARE OP)

## 2024-05-10 PROCEDURE — 84100 ASSAY OF PHOSPHORUS: CPT

## 2024-05-10 PROCEDURE — 82947 ASSAY GLUCOSE BLOOD QUANT: CPT

## 2024-05-10 PROCEDURE — 82310 ASSAY OF CALCIUM: CPT

## 2024-05-10 PROCEDURE — 85025 COMPLETE CBC W/AUTO DIFF WBC: CPT

## 2024-05-10 PROCEDURE — C9113 INJ PANTOPRAZOLE SODIUM, VIA: HCPCS

## 2024-05-10 PROCEDURE — 94640 AIRWAY INHALATION TREATMENT: CPT

## 2024-05-10 PROCEDURE — 2500000005 HC RX 250 GENERAL PHARMACY W/O HCPCS: Performed by: INTERNAL MEDICINE

## 2024-05-10 PROCEDURE — 2500000001 HC RX 250 WO HCPCS SELF ADMINISTERED DRUGS (ALT 637 FOR MEDICARE OP): Performed by: NURSE PRACTITIONER

## 2024-05-10 PROCEDURE — 2500000004 HC RX 250 GENERAL PHARMACY W/ HCPCS (ALT 636 FOR OP/ED): Performed by: NURSE PRACTITIONER

## 2024-05-10 PROCEDURE — 99232 SBSQ HOSP IP/OBS MODERATE 35: CPT | Performed by: NURSE PRACTITIONER

## 2024-05-10 PROCEDURE — 2060000001 HC INTERMEDIATE ICU ROOM DAILY

## 2024-05-10 PROCEDURE — 37799 UNLISTED PX VASCULAR SURGERY: CPT

## 2024-05-10 PROCEDURE — 2780000003 HC OR 278 NO HCPCS

## 2024-05-10 RX ADMIN — METHYLPREDNISOLONE SODIUM SUCCINATE 40 MG: 40 INJECTION, POWDER, FOR SOLUTION INTRAMUSCULAR; INTRAVENOUS at 21:40

## 2024-05-10 RX ADMIN — HYDROCODONE BITARTRATE AND HOMATROPINE METHYLBROMIDE 5 ML: 5; 1.5 SOLUTION ORAL at 12:36

## 2024-05-10 RX ADMIN — GABAPENTIN 250 MG: 250 SOLUTION ORAL at 09:24

## 2024-05-10 RX ADMIN — BRIMONIDINE TARTRATE 1 DROP: 2 SOLUTION/ DROPS OPHTHALMIC at 09:25

## 2024-05-10 RX ADMIN — BRIMONIDINE TARTRATE 1 DROP: 2 SOLUTION/ DROPS OPHTHALMIC at 22:08

## 2024-05-10 RX ADMIN — ATORVASTATIN CALCIUM 20 MG: 20 TABLET, FILM COATED ORAL at 21:41

## 2024-05-10 RX ADMIN — GABAPENTIN 500 MG: 250 SOLUTION ORAL at 21:42

## 2024-05-10 RX ADMIN — ACETAMINOPHEN 650 MG: 325 TABLET ORAL at 21:41

## 2024-05-10 RX ADMIN — IPRATROPIUM BROMIDE AND ALBUTEROL SULFATE 3 ML: 2.5; .5 SOLUTION RESPIRATORY (INHALATION) at 07:46

## 2024-05-10 RX ADMIN — ACETYLCYSTEINE 600 MG: 200 SOLUTION ORAL; RESPIRATORY (INHALATION) at 16:03

## 2024-05-10 RX ADMIN — IPRATROPIUM BROMIDE AND ALBUTEROL SULFATE 3 ML: 2.5; .5 SOLUTION RESPIRATORY (INHALATION) at 11:00

## 2024-05-10 RX ADMIN — FENTANYL 1 PATCH: 50 PATCH TRANSDERMAL at 14:02

## 2024-05-10 RX ADMIN — IPRATROPIUM BROMIDE AND ALBUTEROL SULFATE 3 ML: 2.5; .5 SOLUTION RESPIRATORY (INHALATION) at 16:03

## 2024-05-10 RX ADMIN — Medication 100 MG: at 09:25

## 2024-05-10 RX ADMIN — HYDROXYZINE HYDROCHLORIDE 25 MG: 25 TABLET, FILM COATED ORAL at 21:41

## 2024-05-10 RX ADMIN — HYDROMORPHONE HYDROCHLORIDE 0.2 MG: 1 INJECTION, SOLUTION INTRAMUSCULAR; INTRAVENOUS; SUBCUTANEOUS at 10:43

## 2024-05-10 RX ADMIN — GABAPENTIN 250 MG: 250 SOLUTION ORAL at 14:46

## 2024-05-10 RX ADMIN — PIPERACILLIN SODIUM AND TAZOBACTAM SODIUM 3.38 G: 3; .375 INJECTION, SOLUTION INTRAVENOUS at 09:25

## 2024-05-10 RX ADMIN — HYDROMORPHONE HYDROCHLORIDE 0.2 MG: 1 INJECTION, SOLUTION INTRAMUSCULAR; INTRAVENOUS; SUBCUTANEOUS at 18:26

## 2024-05-10 RX ADMIN — Medication 30 PERCENT: at 20:00

## 2024-05-10 RX ADMIN — ACETYLCYSTEINE 600 MG: 200 SOLUTION ORAL; RESPIRATORY (INHALATION) at 07:46

## 2024-05-10 RX ADMIN — PIPERACILLIN SODIUM AND TAZOBACTAM SODIUM 3.38 G: 3; .375 INJECTION, SOLUTION INTRAVENOUS at 04:55

## 2024-05-10 RX ADMIN — ACETYLCYSTEINE 600 MG: 200 SOLUTION ORAL; RESPIRATORY (INHALATION) at 11:00

## 2024-05-10 RX ADMIN — ACETAMINOPHEN 650 MG: 160 SOLUTION ORAL at 08:04

## 2024-05-10 RX ADMIN — VANCOMYCIN 1 G: 1 INJECTION, SOLUTION INTRAVENOUS at 23:40

## 2024-05-10 RX ADMIN — LEVETIRACETAM 750 MG: 100 SOLUTION ORAL at 09:25

## 2024-05-10 RX ADMIN — MIDODRINE HYDROCHLORIDE 10 MG: 10 TABLET ORAL at 08:04

## 2024-05-10 RX ADMIN — METHYLPREDNISOLONE SODIUM SUCCINATE 40 MG: 40 INJECTION, POWDER, FOR SOLUTION INTRAMUSCULAR; INTRAVENOUS at 04:52

## 2024-05-10 RX ADMIN — PANTOPRAZOLE SODIUM 40 MG: 40 INJECTION, POWDER, FOR SOLUTION INTRAVENOUS at 09:25

## 2024-05-10 RX ADMIN — PIPERACILLIN SODIUM AND TAZOBACTAM SODIUM 3.38 G: 3; .375 INJECTION, SOLUTION INTRAVENOUS at 16:29

## 2024-05-10 RX ADMIN — VANCOMYCIN 1 G: 1 INJECTION, SOLUTION INTRAVENOUS at 00:26

## 2024-05-10 RX ADMIN — PIPERACILLIN SODIUM AND TAZOBACTAM SODIUM 3.38 G: 3; .375 INJECTION, SOLUTION INTRAVENOUS at 21:41

## 2024-05-10 RX ADMIN — INSULIN LISPRO 1 UNITS: 100 INJECTION, SOLUTION INTRAVENOUS; SUBCUTANEOUS at 12:29

## 2024-05-10 RX ADMIN — METHYLPREDNISOLONE SODIUM SUCCINATE 40 MG: 40 INJECTION, POWDER, FOR SOLUTION INTRAMUSCULAR; INTRAVENOUS at 12:28

## 2024-05-10 RX ADMIN — LEVETIRACETAM 750 MG: 100 SOLUTION ORAL at 21:40

## 2024-05-10 RX ADMIN — FENTANYL 1 PATCH: 12 PATCH TRANSDERMAL at 14:02

## 2024-05-10 RX ADMIN — HYDROXYZINE HYDROCHLORIDE 25 MG: 25 TABLET, FILM COATED ORAL at 10:44

## 2024-05-10 RX ADMIN — METHOCARBAMOL 500 MG: 500 TABLET ORAL at 23:30

## 2024-05-10 RX ADMIN — Medication 30 PERCENT: at 08:00

## 2024-05-10 RX ADMIN — ENOXAPARIN SODIUM 40 MG: 40 INJECTION SUBCUTANEOUS at 09:25

## 2024-05-10 RX ADMIN — Medication 5 ML: at 08:04

## 2024-05-10 ASSESSMENT — PAIN SCALES - GENERAL
PAINLEVEL_OUTOF10: 5 - MODERATE PAIN
PAINLEVEL_OUTOF10: 0 - NO PAIN
PAINLEVEL_OUTOF10: 0 - NO PAIN
PAINLEVEL_OUTOF10: 3
PAINLEVEL_OUTOF10: 9
PAINLEVEL_OUTOF10: 2
PAINLEVEL_OUTOF10: 4
PAINLEVEL_OUTOF10: 2
PAINLEVEL_OUTOF10: 8
PAINLEVEL_OUTOF10: 9

## 2024-05-10 ASSESSMENT — PAIN DESCRIPTION - ORIENTATION
ORIENTATION: LOWER
ORIENTATION: LEFT
ORIENTATION: LEFT

## 2024-05-10 ASSESSMENT — PAIN DESCRIPTION - LOCATION
LOCATION: THROAT
LOCATION: CHEST
LOCATION: BACK
LOCATION: CHEST

## 2024-05-10 ASSESSMENT — PAIN DESCRIPTION - DESCRIPTORS
DESCRIPTORS: ACHING
DESCRIPTORS: ACHING

## 2024-05-10 ASSESSMENT — PAIN SCALES - WONG BAKER: WONGBAKER_NUMERICALRESPONSE: HURTS EVEN MORE

## 2024-05-10 ASSESSMENT — PAIN - FUNCTIONAL ASSESSMENT
PAIN_FUNCTIONAL_ASSESSMENT: 0-10

## 2024-05-10 NOTE — PROGRESS NOTES
Pulmonary Progress Note 05/10/24     Patient seen in follow-up for acute respiratory failure with hypoxia, lung cancer, likely malignant bilateral pleural effusions and left upper lobe collapse    Subjective   Interval History:   Currently off of norepinephrine.  Remains on high flow.    Objective   Vital signs in last 24 hours:  Temp:  [36.2 °C (97.2 °F)-36.9 °C (98.4 °F)] 36.6 °C (97.9 °F)  Heart Rate:  [] 102  Resp:  [5-42] 23  BP: (110-158)/() 146/94  FiO2 (%):  [40 %-97 %] 97 %    Intake/Output last 3 shifts:  I/O last 3 completed shifts:  In: 928.7 (17.9 mL/kg) [I.V.:58.7 (1.1 mL/kg); NG/GT:570; IV Piggyback:300]  Out: 450 (8.7 mL/kg) [Urine:450 (0.2 mL/kg/hr)]  Weight: 51.9 kg   Intake/Output this shift:  No intake/output data recorded.    Physical Exam  Vitals reviewed.   Constitutional:       Appearance: She is ill-appearing. She is not toxic-appearing.      Comments: Vapotherm   Cardiovascular:      Rate and Rhythm: Normal rate and regular rhythm.   Pulmonary:      Effort: Pulmonary effort is normal.      Breath sounds: Examination of the right-lower field reveals decreased breath sounds. Examination of the left-lower field reveals decreased breath sounds. Decreased breath sounds present. No wheezing.   Musculoskeletal:      Right lower leg: No edema.      Left lower leg: No edema.   Skin:     General: Skin is warm and dry.   Neurological:      General: No focal deficit present.      Mental Status: She is alert.   Psychiatric:         Mood and Affect: Mood normal.         Behavior: Behavior normal.         Scheduled medications  acetylcysteine, 3 mL, nebulization, 4x daily  [Held by provider] amLODIPine, 10 mg, oral, Daily  aspirin, 81 mg, oral, Once  atorvastatin, 20 mg, oral, Nightly  brimonidine, 1 drop, Right Eye, BID  enoxaparin, 40 mg, subcutaneous, Daily  pantoprazole, 40 mg, oral, Daily   Or  esomeprazole, 40 mg, nasoduodenal tube, Daily   Or  pantoprazole, 40 mg,  intravenous, Daily  fentaNYL, 1 patch, transdermal, q72h  fentaNYL, 1 patch, transdermal, q72h  [Held by provider] furosemide, 40 mg, intravenous, Daily  gabapentin, 250 mg, oral, BID  gabapentin, 500 mg, oral, Nightly  insulin lispro, 0-5 Units, subcutaneous, q4h  ipratropium-albuteroL, 3 mL, nebulization, q4h while awake  levETIRAcetam, 750 mg, g-tube, BID  [Held by provider] lisinopril, 20 mg, oral, Daily  methylPREDNISolone sodium succinate (PF), 40 mg, intravenous, q8h  midodrine, 10 mg, oral, TID with meals  oxygen, , inhalation, Continuous - Inhalation  piperacillin-tazobactam, 3.375 g, intravenous, q6h  psyllium, 1 packet, oral, Daily  sennosides-docusate sodium, 2 tablet, g-tube, BID  thiamine, 100 mg, oral, Daily  vancomycin (Xellia) 1 g in 200 mL, 1 g, intravenous, q24h      Continuous medications       PRN medications  PRN medications: acetaminophen **OR** acetaminophen **OR** acetaminophen, artificial saliva (yerbas-lyt), dextrose, dextrose, glucagon, glucagon, hydrocodone-homatropine, HYDROmorphone, hydrOXYzine HCL, methocarbamol, ondansetron, oxygen, polyethylene glycol, vancomycin     Labs:  Lab Results   Component Value Date     05/10/2024    K 4.3 05/10/2024     05/10/2024    CO2 22 (L) 05/10/2024    BUN 24 05/10/2024    CREATININE 0.90 05/10/2024    GLUCOSE 134 (H) 05/10/2024    CALCIUM 10.8 (H) 05/10/2024     Lab Results   Component Value Date    WBC 14.6 (H) 05/10/2024    HGB 9.2 (L) 05/10/2024    HCT 30.2 (L) 05/10/2024    MCV 81 05/10/2024     05/10/2024       Imaging:  XR chest 1 view    Result Date: 5/8/2024  Interpreted By:  Russell Escalante, STUDY: XR CHEST 1 VIEW;  5/7/2024 10:18 pm   INDICATION: Signs/Symptoms:hypoxemia.   COMPARISON: CXR 05/07/2024, CT chest 05/07/2024   ACCESSION NUMBER(S): [id number]   ORDERING CLINICIAN: SERA SMITH   FINDINGS: There is worsening consolidation within the right upper lobe, right middle lobe. Similar consolidation in the  right lower lobe. There is redemonstration of a prominent left basilar opacity representing sub pulmonic effusion. There is redemonstration of complete left upper lobe collapse and left hilar mass representing locally invasive esophageal cancer and malignant lymphadenopathy. No pneumothorax.       Please see above.     MACRO: None.   Signed by: Russell Escalante 5/8/2024 1:28 AM Dictation workstation:   [id number]    CT chest w IV contrast    Result Date: 5/7/2024  Interpreted By:  Chaitanya Lovell, STUDY: CT CHEST W IV CONTRAST;  5/7/2024 4:25 am   INDICATION: Signs/Symptoms:pleural effusion.   COMPARISON: 05/13/2022   ACCESSION NUMBER(S): [id number]   ORDERING CLINICIAN: JENN NEWMAN   TECHNIQUE: Helical data acquisition of the chest was obtained without intravenous contrast. Images were reformatted in axial, coronal, and sagittal planes.   FINDINGS: LOWER NECK AND CHEST WALL:  Right chest port catheter.   MEDIASTINUM/REGINE:No lymphadenopathy. Metallic esophageal stent in place. Trace debris within the stent lumen. Confluent soft tissue density within the posterior paratracheal and paraesophageal mediastinum may reflect confluence of adenopathy or locally advanced malignancy.   CARDIOVASCULAR:  Cardiac chamber size within normal limits. Small pericardial effusion. Right chest port catheter tip terminating at the SVC/RA junction. Aortic caliber normal. Normal caliber of main pulmonary artery. Scattered coronary atherosclerotic calcification.   LUNGS, AIRWAYS, AND PLEURA:  Severe focal narrowing of the left mainstem bronchus. There is complete occlusion/filling of the majority of the left lower lobar and left lower lobe segmental bronchi. Moderate bilateral pleural effusions. Moderate emphysema. Patchy ground-glass opacities throughout the right lung and dependent right lower lobe consolidation may reflect an infectious/inflammatory process. Somewhat nodular interlobular septal thickening at the  medial right lung base may reflect lymphangitic carcinomatosis. There are spiculated pulmonary nodules within the left upper lobe measuring 2.1 cm, right upper lobe measuring 1.3 cm, and posteroinferior right lower lobe measuring 1.5 cm as well as the superior segment of the right lower lobe measuring 1.1 cm. There is complete collapse of the left upper lobe and complete filling of the left upper lobar bronchi.   MUSCULOSKELETAL: No acute osseous abnormality or suspicious osseous lesions. Mild multilevel spinal degenerative change.   UPPER ABDOMEN: Unremarkable.       1. Esophageal stent in place with soft tissue attenuation material in the posterior mediastinum surrounding the esophagus and central airways, likely reflecting known locally advanced esophageal squamous cell carcinoma with possible additional superimposed confluent adenopathy. There is complete narrowing/filling of the left upper lobe airway with collapse/consolidation of the left upper lobe. There is partial filling/narrowing of the left lower lobe airway with extensive material filling the central bronchi of the left lower lobe. Extensive patchy ground-glass/consolidative opacities within the right lung concerning for an infectious/inflammatory process. 2. Moderate bilateral pleural effusions. 3. Spiculated bilateral pulmonary nodules, likely metastatic. 4. Small pericardial effusion. 5. Somewhat nodular interlobular septal thickening at the medial right lung base may reflect lymphangitic carcinomatosis.   Signed by: Chaitanya Lovell 5/7/2024 5:02 AM Dictation workstation:   [id number]    XR chest 1 view    Result Date: 5/7/2024  Interpreted By:  Russell Escalante, STUDY: XR CHEST 1 VIEW;  5/7/2024 2:29 am   INDICATION: Signs/Symptoms:Chest Pain.   COMPARISON: 04/06/2017   ACCESSION NUMBER(S): [id number]   ORDERING CLINICIAN: JENN NEWMAN   FINDINGS: There is an esophageal stent. There is a right chest port terminating over the  cavoatrial junction.   There is a large left pleural effusion. There is new bilateral hilar enlargement and nodularity likely representing lymphadenopathy. There is left hemithorax volume loss. There is airspace disease at the left lung base. There is diffuse peribronchovascular and interstitial prominence. No pneumothorax.       Multiple cardiopulmonary abnormalities including probable pulmonary edema, large left pleural effusion, bilateral hilar and mediastinal lymphadenopathy and probable airway obstruction on the left resulting in volume loss of the left hemithorax. CT chest with contrast is recommended for further evaluation.   Esophageal stent consistent with esophageal cancer.   MACRO: None.   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 M: CTBWO_3 CT Dose-Length Product (DLP): 1208  mGy*cm CT Dose Reduction Employed: Automated exposure control (AEC) COMPARISON:  CT brain 04/13/2024.  MRI brain 04/12/2024. RESULT: Acute change:  No evidence of an acute intracranial process. Hemorrhage:  No evidence of acute intracranial hemorrhage. Mass Lesion / Mass Effect: Again noted is the low attenuating mass lesion in the left medial parietal lobe with confluent surrounding vasogenic edema, intracranial metastasis, better delineated on the prior MR brain 04/12/2024.  Otherwise, no midline shift or herniation. Chronic change:  Patchy nonspecific supratentorial white matter changes likely reflecting chronic microvascular ischemia. Parenchyma:  Mild generalized parenchymal volume loss. Ventricles:   Commensurate with volume loss. Other:  The calvarium, skull base, imaged paranasal sinuses, mastoids, orbits and extracranial soft tissues are unremarkable.  Stereotactic frame is in place.  (topogram) images:  No additional findings.    IMPRESSION: Localization exam.  No acute intracranial process. : VEENA   Transcribe Date/Time: Apr 16 2024 11:46A Dictated by : KAREY AVELAR DO This examination was interpreted and the report reviewed and electronically signed by: KAREY AVELAR DO on Apr 16 2024 11:49AM  EST    CT BRAIN STEREOLOCAL WO IVCON    Result Date: 4/13/2024  * * *Final Report* * * DATE OF EXAM: Apr 13 2024  2:31PM   [facility]   0503  -  CT BRAIN STEREOLOCAL WO IVCON  / ACCESSION #  [id number] PROCEDURE REASON: Other (document in comments)      * * * * Physician Interpretation * * * *  EXAMINATION:  CT STEREOLOCALIZATION BRAIN WITH CONTRAST HISTORY:  63 year old female with left parietal mass, preoperative stereo localization exam. TECHNIQUE: CT head high-resolution stereotactic localization without contrast. M: CTBWO_3 CT Dose-Length Product (DLP): 1336  mGy*cm CT Dose Reduction Employed: Automated exposure control (AEC) COMPARISON:  MR brain with and without contrast 04/12/2024. RESULT: The patient's known left parietal mass lesion is better demonstrated on the 04/12/2024 MRI with and without contrast.  There is surrounding vasogenic edema.  Mild local mass effect with partial effacement of the left occipital horn and atrium of the left lateral ventricle. No acute infarct or hemorrhage.  No midline shift or abnormal extra-axial fluid collection.  Scattered white matter hypoattenuation, nonspecific, however likely representing sequela of prior chronic microvascular ischemia.  Mild to moderate generalized parenchymal volume loss with commensurate prominence of the cortical sulci and ventricles.  Partial left lateral ventricular effacement, as above.  No hydrocephalus or ventricular trapping.  Paranasal sinuses are clear.  Mastoid air cells and middle ear cavities are clear bilaterally.  Bilateral orbits are within normal limits.   Overlying soft tissues demonstrate no focal abnormality.  No destructive calvarial lesion.  Patient is edentulous.    IMPRESSION: Stereotactic localization examination. Known left parietal lesion is better demonstrated on the 04/12/2024 MRI with and without contrast. No acute hemorrhage or large territorial infarct. : [facility]   Transcribe Date/Time: Apr 13 2024  2:33P Dictated by : RAJ DELGADO MD This examination was interpreted and the report reviewed and electronically signed by: ROBERT HAMPTON MD on Apr 13 2024  2:50PM  EST    FL enema single contrast water soluble    Result Date: 4/12/2024  * * *Final Report* * * DATE OF EXAM: Apr 12 2024  2:28PM   [facility]   [id number]  -  XR COLON SINGLE CONTRAST  / ACCESSION #  [id number] PROCEDURE REASON: Assess for fistula      * * * * Physician Interpretation * * * *  WATER SOLUBLE CONTRAST ENEMA HISTORY: Assess for rectovaginal fistula. COMPARISON: CT abdomen and pelvis 02/27/2024 TECHNIQUE: Water soluble-contrast enema was performed after insertion of a rectal tube.  Spot and overhead images were obtained. Contrast: RECTAL:  400 ml of OMNIPAQUE 300 Fluoroscopy radiation summary: Fluoroscopy time: 1:30 (min:sec). Air kerma: 80.5 mGy. RESULT: : No dilated loops of bowel.  Bilateral pelvic tubal ligation clips.  Calcified uterine fibroids. Contrast refluxes to the level of the mid sigmoid colon.  There is no communication with the vagina.  Filling defects in the opacified colon consistent with feces. Examination ended due to patient discomfort and difficulty retaining the contrast material. The study was performed by CHANCE Bryson, under the supervision of Dr. Kaiser, who was present for the critical portion of the exam.   Images associated with this study were submitted for interpretation and reviewed by   Awilda. ===========    IMPRESSION: NO DEMONSTRATED RECTOVAGINAL FISTULA. : [facility]   Transcribe Date/Time: Apr 12 2024  2:38P Dictated by : CHANCE SAL This examination was interpreted and the report reviewed and electronically signed by: MABEL BISHOP MD on Apr 12 2024  3:25PM  EST    MR brain w and wo IV contrast    Result Date: 4/12/2024  * * *Final Report* * * DATE OF EXAM: Apr 12 2024  5:39AM   [facility]   0295  -  MRI BRAIN WO/W IVCON  / ACCESSION #  [id number] PROCEDURE REASON: Metastatic disease evaluation      * * * * Physician Interpretation * * * *  EXAMINATION:  MRI BRAIN WO/W IVCON CLINICAL HISTORY: Gamma knife protocol preop localization exam. TECHNIQUE:  Limited Gamma knife protocol coronal T1 pre and post contrast, axial postcontrast T1, axial T2 sequence. Contrast:  11 mL [facility] IV COMPARISON: MRI of yesterday RESULT: Mass Lesion/ Mass Effect:    Peripherally enhancing cystic and solid right parietal mass measuring approximately 3 x 3 x 3 cm with more heterogenous solid components along its lateral margin.  Moderate surrounding vasogenic edema and mild locoregional mass effect.  No midline shift. No other abnormal enhancing brain lesions. Chronic Change:  Unchanged burden of supra and infratentorial chronic small vessel change. Parenchyma:   No significant volume loss for age. Ventricles:     Normal caliber and morphology. Vasculature:    Major intracranial arterial structures, and dural venous sinuses show typical flow void, suggesting patency by spin echo criteria. Other:  The visualized paranasal sinuses and mastoid air cells are clear.  The orbits and extracranial soft tissues are unremarkable.    IMPRESSION: Unchanged left parietal cystic and solid mass most compatible with metastasis.  No other enhancing brain lesions. : [facility]   Transcribe Date/Time: Apr 12 2024  6:13A Dictated by : ADIN DARBY MD This examination was interpreted and the report  reviewed and electronically signed by: ADIN DARBY MD on Apr 12 2024  6:30AM  EST    MR brain w and wo IV contrast    Result Date: 4/11/2024  * * *Final Report* * * DATE OF EXAM: Apr 10 2024 11:48PM   [facility]   0295  -  MRI BRAIN WO/W IVCON  / ACCESSION #  [id number] PROCEDURE REASON: Brain/CNS neoplasm, monitor      * * * * Physician Interpretation * * * *  EXAMINATION:  MRI BRAIN WO IVCON CLINICAL HISTORY: Brain CNS neoplasm. TECHNIQUE:  Routine brain MRI protocol without and with contrast including diffusion images. MQ:  MRBWOW_2 Contrast: None COMPARISON: CT brain 04/09/2024 RESULT: Truncated exam secondary to patient tolerance.  No postcontrast sequences are available. Acute Change:   There is no evidence of restricted diffusion to suggest an acute infarct.  There is some restricted diffusion around the periphery of the left parietal mass. Hemorrhage:    No evidence of prior parenchymal hemorrhage on the gradient echo images. Mass Lesion/ Mass Effect: 3 cm left parietal mass and moderate surrounding vasogenic edema. Chronic Change:  Scattered punctate foci of increased T2 and FLAIR signal are noted in the supratentorial and infratentorial white matter which is a nonspecific finding, but likely represents mild to moderate chronic microvascular ischemia. Parenchyma:   No significant volume loss for age. Ventricles:     Normal caliber and morphology. Skull Base:    Hypothalamic and pituitary region are grossly normal.   Craniocervical junction is normal. No significant marrow replacement process. Vasculature:    Major intracranial arterial structures, and dural venous sinuses show typical flow void, suggesting patency by spin echo criteria. Other:  No abnormal signal in the sinuses or mastoids.  The orbits and extracranial soft tissues are unremarkable.    IMPRESSION: Left parietal mass and surrounding vasogenic edema are unchanged from the CT of yesterday allowing for differences in modality. Truncated exam secondary  to patient tolerance with no postcontrast imaging limiting evaluation for additional metastatic disease. : VEENA   Transcribe Date/Time: Apr 11 2024 12:27A Dictated by : ADIN DARBY MD This examination was interpreted and the report reviewed and electronically signed by: ADIN DARBY MD on Apr 11 2024 12:40AM  EST    XR chest 1 view    Result Date: 4/10/2024  * * *Final Report* * * DATE OF EXAM: Apr 9 2024 11:00PM   [facility]   [id number]  -  XR CHEST 1V FRONTAL PORT  / ACCESSION #  [id number] PROCEDURE REASON: Shortness of breath      * * * * Physician Interpretation * * * *  EXAMINATION:  CHEST RADIOGRAPH (PORTABLE SINGLE VIEW AP) Exam Date/Time:  4/9/2024 11:00 PM Clinical History: Shortness of breath MQ:  XCPMC_6 Comparison:  Earlier the same day. RESULT: Lines, tubes, and devices:  Stable right-sided Port-A-Cath and esophageal stent.  Lungs and pleura:  No significant change of small left pleural effusion and associated basilar atelectasis.  Stable left parahilar opacity also related to atelectasis..  No pneumothorax.  Known metastatic lung nodules at the seen on prior chest CT.  No pneumothorax. Cardiomediastinal silhouette:  Stable cardiomediastinal silhouette. Other:  .    IMPRESSION: See result. : VEENA   Transcribe Date/Time: Apr 10 2024  7:11A Dictated by : SERENE HELM MD This examination was interpreted and the report reviewed and electronically signed by: SERENE HELM MD on Apr 10 2024  7:16AM  EST    CT angio chest w and wo IV contrast    Result Date: 4/9/2024  * * *Final Report* * * DATE OF EXAM: Apr 9 2024 10:53AM   [facility]   0540  -  CT CHEST W IVCON PE  / ACCESSION #  [id number] PROCEDURE REASON: Pulmonary embolism (PE) suspected, high prob      * * * * Physician Interpretation * * * * RESULT: EXAMINATION:  CHEST CT WITH CONTRAST (PULMONARY EMBOLISM PROTOCOL) CLINICAL HISTORY: Pulmonary embolus suspected Technique:  Spiral CT acquisition of the chest from the thoracic inlet  to the upper abdomen following IV contrast.  Axial 1 and 3 mm thick slices plus coronal and sagittal reformatted images. MQ:  CTCP_5 Contrast: mL Omnipaque 300 IV CT Radiation dose: Integrated Dose-length product (DLP) for this visit = mGy*cm CT Dose Reduction Employed: Automated exposure control(AEC) and iterative recon Comparison:  CT chest 2/27/2024 RESULT: Limitations:  Some respiratory motion. Evaluation for thromboembolic disease:      - Right heart chambers:  No thromboembolic disease.      - Main pulmonary arteries:  No thromboembolic disease.      - Lobar pulmonary arteries:  No thromboembolic disease.      - Segmental pulmonary arteries:  Limited due to motion in some areas.      - Subsegmental pulmonary arteries:  Limited due to motion in some areas      - Additional pulmonary artery findings:  The main pulmonary artery is normal in caliber. Lines, tubes, and devices:  Esophageal stent is situated in the mid/lower esophagus. Lung parenchyma and airways: Increased narrowing of the left main bronchus with occlusion of the left upper lobe bronchus and marked narrowing of the left lower lobe bronchus which is increased compared to prior. New lobar atelectasis of the left upper lobe/lingula. Numerous bilateral pulmonary metastases, the largest of which measures up to 2.2 cm are unchanged. Unable to assess the metastases in the atelectatic left upper lobe/lingula. Paramediastinal lung changes, presumably due to prior radiation therapy, similar to prior. There is again pulmonary emphysema. Pleural space:  Small left pleural effusion. Lower neck, lymph nodes, and mediastinum:  There is again esophageal stent. There is again soft tissue thickening along the left side of the mediastinum (series 8 image 97), which extends to the hilar region and causes worsened narrowing of the left main bronchus/left lower lobe bronchus and occlusion of the left upper lobe bronchus. The amount of soft tissue appears more prominent  compared to prior but is difficult to measure. Heart, pericardium, and thoracic vessels:  The heart is not enlarged.   There is a small pericardial effusion.  No thoracic aortic aneurysm.   Atheromatous calcification of the aorta. Bones and soft tissues:  Degenerative changes of the spine. Upper abdomen:  Water density lesion posterior right hepatic lobe. Localizer images: No additional findings.    IMPRESSION: Evaluation of some of the small peripheral pulmonary arteries is limited by respiratory motion. No definite CT evidence of pulmonary embolus otherwise. Increased amount of mediastinal soft tissue which encases the left-sided bronchi with occlusion of the left upper lobe bronchus and increased narrowing of the left main and left lower lobe bronchi. New complete atelectasis of the left upper lobe. The visualized bilateral pulmonary metastases are grossly unchanged. Small left pleural effusion. Small pericardial effusion. Transcribed Using Voice Recognition Transcribe Date/Time: Apr 9 2024 11:41A Dictated by: JO CORDOVA MD This examination was interpreted and the report reviewed and electronically signed by: JO CORDOVA MD on Apr 9 2024 12:07PM  EST    CT head wo IV contrast    Result Date: 4/9/2024  * * *Final Report* * * DATE OF EXAM: Apr 9 2024 10:52AM   [facility]   0504  -  CT BRAIN WO IVCON  / ACCESSION #  [id number] PROCEDURE REASON: Seizure, focal (Ped 0-18y)      * * * * Physician Interpretation * * * * RESULT: EXAMINATION: CT BRAIN WO IVCON CLINICAL HISTORY: Altered mental status, seizure TECHNIQUE:  Serial axial images without IV contrast were obtained from the vertex to the foramen magnum. MQ:  CTBWO_3 CT Radiation dose: Integrated Dose-Length Product (DLP) for this visit =   1187  mGy*cm CT Dose Reduction Employed: Automated exposure control(AEC) and iterative recon COMPARISON: Head CT 10/27/2023 RESULT: Post-operative change: None. Acute change: No evidence of an acute infarct or other acute  parenchymal process. Hemorrhage: No evidence of acute intracranial hemorrhage. ECASS hemorrhagic transformation score: Not Applicable Mass Lesion / Mass Effect: There is a new 3 cm left parietal mass with localized sulcal effacement and surrounding vasogenic edema. Chronic change: Scattered patchy foci of low attenuation are present within supratentorial white matter which is a nonspecific finding but likely represents mild microvascular ischemia. Parenchyma: There is mild generalized volume loss.  The brain parenchyma is otherwise within normal limits for age. Ventricles: Ventricular enlargement concordant with the degree of parenchymal volume loss. Paranasal sinuses and skull base: The visualized paranasal sinuses are grossly clear.   The skull base and imaged soft tissues are unremarkable. Localizer images: No additional findings.    IMPRESSION: Left parietal mass new from 10/27/2023, with surrounding vasogenic edema and localized mass effect. COMMUNICATION:  Communicated with DR ALFONZO ELIZABETH on 4/9/2024 11:12 AM  via verbal communication. Transcribed Using Voice Recognition Transcribe Date/Time: Apr 9 2024 11:05A Dictated by: NYDIA MADRIGAL MD This examination was interpreted and the report reviewed and electronically signed by: NYDIA MADRIGAL MD on Apr 9 2024 11:16AM  EST    XR chest 1 view    Result Date: 4/9/2024  * * *Final Report* * * DATE OF EXAM: Apr 9 2024  8:13AM   EUX   [id number]  -  XR CHEST 1V FRONTAL PORT  / ACCESSION #  [id number] PROCEDURE REASON: Shortness of breath      * * * * Physician Interpretation * * * * RESULT: EXAMINATION:  CHEST RADIOGRAPH (PORTABLE SINGLE VIEW AP) Exam Date/Time:  4/9/2024 8:13 AM CLINICAL HISTORY: Shortness of breath MQ:  [id number] Comparison:  02/29/2024 RESULT: Lines, tubes, and devices:  Right IJ port, tip terminating at the cavoatrial junction.  The esophageal stent with no significant change in position, superior margin at the level of the clavicles. Lungs and  pleura:  Mild left basilar opacities, likely subsegmental atelectasis.  No substantial pleural effusion or pneumothorax. Cardiomediastinal silhouette:  Stable cardiomediastinal silhouette with masslike soft tissue prominence from the level of the aortic arch to the izabela.    IMPRESSION: See result. Transcribed Using Voice Recognition Transcribe Date/Time: Apr 9 2024  8:16A Dictated by: NYDIA MADRIGAL MD This examination was interpreted and the report reviewed and electronically signed by: NYDIA MADRIGAL MD on Apr 9 2024  8:20AM  EST              Assessment/Plan   Principal Problem:    Sepsis, due to unspecified organism, unspecified whether acute organ dysfunction present (Multi)  Active Problems:    Acute respiratory failure with hypoxia (Multi)    Pleural effusion, bilateral    Esophageal cancer (Multi)    Acute on chronic respiratory failure with hypoxia: Likely in the setting of left upper lobe collapse, advanced metastatic esophageal carcinoma,  Continue with Vapotherm, wean as tolerated  Left upper lobe collapse/postobstructive pneumonia  Continue with empiric antibiotics pending further cultures  Bronchodilators  Bilateral pleural effusions: Again I do not believe contributing significantly with regard to her oxygen data of breathing.  Even to slightly negative fluid balance  Poor prognosis.           LOS: 3 days       Aldair Tamayo MD  Pulmonary/Critical Care medicine

## 2024-05-10 NOTE — PROGRESS NOTES
Patient not medically clear. Patient on high flow oxygen. Patient on IV antibiotics. Palliative following. Possible hospice involvement this stay? Patient came to us as a long term care resident from [facility]. At the time of discharge patient will return to [facility] where she is living long term unless she signs with hospice and that plan changes. Will follow.     05/10/24 [id number]   Discharge Planning   Home or Post Acute Services Post acute facilities (Rehab/SNF/etc)   Type of Post Acute Facility Services Long term care   Patient expects to be discharged to: [facility]   Does the patient need discharge transport arranged? Yes   RoundTrip coordination needed? Yes

## 2024-05-10 NOTE — PROGRESS NOTES
Vancomycin Dosing by Pharmacy - FOLLOW-UP    Debbi Segura is a 63 y.o. year old female who Pharmacy has been consulted for vancomycin dosing for Vancomycin Indications: Pneumonia. Based on the patient's indication and renal status this patient will be dosed based on a goal AUC of 400-600.     Renal function is currently stable.    Current vancomycin dose:  1000 mg every 24 hours    Estimated vancomycin AUC on current dose: 500 mg/L.hr     Visit Vitals  BP (!) 146/94 (BP Location: Left arm, Patient Position: Lying)   Pulse 102   Temp 36.6 °C (97.9 °F) (Temporal)   Resp 23           Lab Results   Component Value Date    CREATININE 0.90 05/10/2024    CREATININE 0.80 05/09/2024    CREATININE 1.00 05/08/2024    CREATININE 0.90 05/07/2024       I/O last 3 completed shifts:  In: 928.7 (17.9 mL/kg) [I.V.:58.7 (1.1 mL/kg); NG/GT:570; IV Piggyback:300]  Out: 450 (8.7 mL/kg) [Urine:450 (0.2 mL/kg/hr)]  Weight: 51.9 kg       Assessment/Plan     Within goal AUC range. Continue current vancomycin regimen.    This dosing regimen is predicted by [employer] to result in the following pharmacokinetic parameters:  Loading dose: N/A  Regimen: 1000 mg IV every 24 hours.  Start time: 00:26 on 05/11/2024  Exposure target: AUC24 (range)400-600 mg/L.hr   AUC24,ss: 500 mg/L.hr  Probability of AUC24 > 400: 84 %  Ctrough,ss: 13.4 mg/L  Probability of Ctrough,ss > 20: 11 %  Probability of nephrotoxicity (Lodise TROY 2009): 9 %    The next level will be obtained on 5/15 at 0500. May be obtained sooner if clinically indicated.   Will continue to monitor renal function daily while on vancomycin and order serum creatinine at least every 48 hours if not already ordered.  Follow for continued vancomycin needs, clinical response, and signs/symptoms of toxicity.     Jose Raul Villar, PharmD

## 2024-05-10 NOTE — PROGRESS NOTES
Debbi Segura is a 63 y.o. female on day 3 of admission presenting with Sepsis, due to unspecified organism, unspecified whether acute organ dysfunction present (Multi).    Subjective   Interval History:       Afebrile, no chills  Remains on high flow  Mild to moderate nonproductive cough  Denies chest pain        Objective   Range of Vitals (last 24 hours)  Heart Rate:  []   Temp:  [35.7 °C (96.3 °F)-36.7 °C (98.1 °F)]   Resp:  [4-42]   BP: (111-164)/()   Weight:  [51.9 kg (114 lb 6.7 oz)]   SpO2:  [91 %-100 %]   Daily Weight  05/10/24 : 51.9 kg (114 lb 6.7 oz)    Body mass index is 18.48 kg/m².    Physical Exam  Constitutional:       General: She is awake.      Appearance: She is ill-appearing.   HENT:      Head: Normocephalic and atraumatic.      Right Ear: External ear normal.      Left Ear: External ear normal.      Nose: Nose normal.   Eyes:      General: No scleral icterus.     Extraocular Movements: Extraocular movements intact.   Cardiovascular:      Rate and Rhythm: Tachycardia present.      Heart sounds: Normal heart sounds, S1 normal and S2 normal.   Pulmonary:      Breath sounds: Decreased breath sounds present.   Abdominal:      General: Bowel sounds are normal.      Palpations: Abdomen is soft.   Musculoskeletal:      Cervical back: Normal range of motion and neck supple.      Right lower leg: No edema.      Left lower leg: No edema.   Skin:     General: Skin is warm and dry.   Neurological:      Mental Status: She is alert.   Psychiatric:         Behavior: Behavior normal. Behavior is cooperative.     Antibiotics  aspirin chewable tablet 324 mg  famotidine PF (Pepcid) injection 20 mg  oxygen (O2) therapy  sodium chloride 0.9 % bolus 1,698 mL  vancomycin 1.5 g in 300 mL (Xellia) IVPB 1.5 g  furosemide (Lasix) injection 40 mg  iohexol (OMNIPaque) 350 mg iodine/mL solution 75 mL  furosemide (Lasix) injection 40 mg  aspirin chewable tablet 81 mg  acetaminophen (Tylenol) tablet 650  mg  acetaminophen (Tylenol) oral liquid 650 mg  acetaminophen (Tylenol) suppository 650 mg  enoxaparin (Lovenox) syringe 40 mg  polyethylene glycol (Glycolax, Miralax) packet 17 g  amLODIPine (Norvasc) tablet 10 mg  atorvastatin (Lipitor) tablet 20 mg  brimonidine (AlphaGAN) 0.2 % ophthalmic solution 1 drop  lisinopril tablet 20 mg  thiamine (Vitamin B-1) tablet 100 mg  piperacillin-tazobactam-dextrose (Zosyn) IV 3.375 g      methocarbamol (Robaxin) tablet  ondansetron (Zofran) tablet    pantoprazole (ProtoNix) EC tablet  hydrOXYzine (Atarax) tablet  levETIRAcetam (Keppra) 100 mg/mL solution 750 mg  hydrOXYzine HCL (Atarax) tablet 25 mg  methocarbamol (Robaxin) tablet 500 mg  ondansetron (Zofran) tablet 8 mg  sennosides (Senokot) tablet 17.2 mg  pantoprazole (ProtoNix) EC tablet 40 mg  ipratropium-albuteroL (Duo-Neb) 0.5-2.5 mg/3 mL nebulizer solution 3 mL  acetylcysteine (Mucomyst) 200 mg/mL (20 %) nebulizer solution 600 mg  fentaNYL (Duragesic) 50 mcg/hr patch 1 patch  fentaNYL (Duragesic) 12 mcg/hr patch 1 patch  gabapentin (Neurontin) solution 250 mg  gabapentin (Neurontin) solution 500 mg  methocarbamol (Robaxin) tablet 500 mg  vancomycin (Vancocin) pharmacy to dose - pharmacy monitoring  vancomycin (Xellia) 1 g in 200 mL (Xellia) IVPB 1 g  enoxaparin (Lovenox) syringe 40 mg  oxygen (O2) therapy  albumin human 5 % infusion 12.5 g  glucagon (Glucagen) injection 1 mg  dextrose 50 % injection 25 g  glucagon (Glucagen) injection 1 mg  dextrose 50 % injection 12.5 g  insulin lispro (HumaLOG) injection 0-5 Units  potassium chloride (Klor-Con) packet 20 mEq  pantoprazole (ProtoNix) EC tablet 40 mg  esomeprazole (NexIUM) suspension 40 mg  pantoprazole (ProtoNix) injection 40 mg  norepinephrine (Levophed) 8 mg in dextrose 5% 250 mL (0.032 mg/mL) infusion (premix)  methylPREDNISolone sod succinate (SOLU-Medrol) 40 mg/mL injection 40 mg  hydrocodone-homatropine (Hycodan) 5-1.5 mg/5 mL syrup 5 mL  artificial saliva  "(yerbas-lyt) aerosol,spray 5 mL  sennosides-docusate sodium (Shivani-Colace) 8.6-50 mg per tablet 2 tablet  lidocaine-prilocaine (Emla) cream  HYDROmorphone (Dilaudid) injection 0.2 mg  potassium phosphates 15 mmol in sodium chloride 0.9% 250 mL IV  magnesium sulfate IV 2 g  oxygen (O2) therapy  midodrine (Proamatine) tablet 10 mg      Relevant Results  Labs  Results from last 72 hours   Lab Units 05/10/24  0453 05/09/24  0456 05/08/24  0427   WBC AUTO x10*3/uL 14.6* 8.6 6.5   HEMOGLOBIN g/dL 9.2* 9.1* 9.1*   HEMATOCRIT % 30.2* 29.6* 30.0*   PLATELETS AUTO x10*3/uL 362 286 206   NEUTROS PCT AUTO % 90.1 91.0  --    LYMPHS PCT AUTO % 3.9 5.5  --    MONOS PCT AUTO % 5.1 2.8  --    EOS PCT AUTO % 0.0 0.0  --      Results from last 72 hours   Lab Units 05/10/24  0453 05/09/24  0456 05/08/24  0427   SODIUM mmol/L 136 138 140   POTASSIUM mmol/L 4.3 3.7 3.7   CHLORIDE mmol/L 102 103 103   CO2 mmol/L 22* 22* 23*   BUN mg/dL 24 26* 28*   CREATININE mg/dL 0.90 0.80 1.00   GLUCOSE mg/dL 134* 175* 95   CALCIUM mg/dL 10.8* 10.7* 9.7   ANION GAP mmol/L 12 13 14   EGFR mL/min/1.73m*2 72 82 63   PHOSPHORUS mg/dL 2.2* 2.1* 3.4           Estimated Creatinine Clearance: 51.7 mL/min (by C-G formula based on SCr of 0.9 mg/dL).  No results found for: \"CRP\"  Microbiology  Susceptibility data from last 14 days.  Collected Specimen Info Organism Amoxicillin/Clavulanate Ampicillin Ampicillin/Sulbactam Cefazolin Cefazolin (uncomplicated UTIs only) Ceftriaxone Ciprofloxacin Gentamicin Nitrofurantoin Piperacillin/Tazobactam   05/08/24 Tissue/Biopsy from Wound/Tissue Mixed Gram-Positive and Gram-Negative Bacteria             05/07/24 Urine from Clean Catch/Voided Klebsiella pneumoniae/variicola (1)  S  R  S  R  S  S  R  S  R  S     Klebsiella pneumoniae/variicola (2)  S  R  S  S  S   I  S  R  S     Collected Specimen Info Organism Trimethoprim/Sulfamethoxazole   05/08/24 Tissue/Biopsy from Wound/Tissue Mixed Gram-Positive and Gram-Negative Bacteria  "   05/07/24 Urine from Clean Catch/Voided Klebsiella pneumoniae/variicola (1)  S     Klebsiella pneumoniae/variicola (2)  S     Imaging  XR chest 1 view    Result Date: 5/9/2024  Interpreted By:  Russell Rucker, STUDY: XR CHEST 1 VIEW;  5/9/2024 8:37 am   INDICATION: Signs/Symptoms:hypoxia, eval pleural effusions.   COMPARISON: Most recent prior is from 05/07/2024. CT scan from 05/07/2024.   ACCESSION NUMBER(S): [id number]   ORDERING CLINICIAN: JESI LEON   TECHNIQUE: Single AP portable view of the chest was obtained.   FINDINGS: MEDIASTINUM/ LUNGS/ REGINE: Stable esophageal stent. Stable right-sided central venous MediPort. Progression of small right pleural effusion. Stable cardiomegaly. There are diffuse infiltrative opacities throughout the right lung. Stable collapse of the left upper lobe with hyper aeration of the left lower lobe. Persistent mild haziness at the left lung base. No blunting of the left lateral costophrenic sulcus.   BONES: No lytic or blastic destructive bone lesion.   UPPER ABDOMEN: Grossly intact.       Cardiomegaly.   Small right pleural effusion, mildly progressed.   Grossly stable infiltrative densities throughout the right lung.   Stable collapse of the left upper lobe with stable hyper aeration of the left lung. Mild stable infiltrate/atelectasis at the left lung base.   Stable esophageal stent. Stable right-sided central venous MediPort.   MACRO: None   Signed by: Russell Rucker 5/9/2024 10:37 AM Dictation workstation:   [id number]    XR chest 1 view    Result Date: 5/8/2024  Interpreted By:  Russell Escalante, STUDY: XR CHEST 1 VIEW;  5/7/2024 10:18 pm   INDICATION: Signs/Symptoms:hypoxemia.   COMPARISON: CXR 05/07/2024, CT chest 05/07/2024   ACCESSION NUMBER(S): [id number]   ORDERING CLINICIAN: SERA SMITH   FINDINGS: There is worsening consolidation within the right upper lobe, right middle lobe. Similar consolidation in the right lower lobe. There is redemonstration of a  prominent left basilar opacity representing sub pulmonic effusion. There is redemonstration of complete left upper lobe collapse and left hilar mass representing locally invasive esophageal cancer and malignant lymphadenopathy. No pneumothorax.       Please see above.     MACRO: None.   Signed by: Russell Escalante 5/8/2024 1:28 AM Dictation workstation:   [id number]    CT chest w IV contrast    Result Date: 5/7/2024  Interpreted By:  Chaitanya Lovell, STUDY: CT CHEST W IV CONTRAST;  5/7/2024 4:25 am   INDICATION: Signs/Symptoms:pleural effusion.   COMPARISON: 05/13/2022   ACCESSION NUMBER(S): [id number]   ORDERING CLINICIAN: JENN NEWMAN   TECHNIQUE: Helical data acquisition of the chest was obtained without intravenous contrast. Images were reformatted in axial, coronal, and sagittal planes.   FINDINGS: LOWER NECK AND CHEST WALL:  Right chest port catheter.   MEDIASTINUM/REGINE:No lymphadenopathy. Metallic esophageal stent in place. Trace debris within the stent lumen. Confluent soft tissue density within the posterior paratracheal and paraesophageal mediastinum may reflect confluence of adenopathy or locally advanced malignancy.   CARDIOVASCULAR:  Cardiac chamber size within normal limits. Small pericardial effusion. Right chest port catheter tip terminating at the SVC/RA junction. Aortic caliber normal. Normal caliber of main pulmonary artery. Scattered coronary atherosclerotic calcification.   LUNGS, AIRWAYS, AND PLEURA:  Severe focal narrowing of the left mainstem bronchus. There is complete occlusion/filling of the majority of the left lower lobar and left lower lobe segmental bronchi. Moderate bilateral pleural effusions. Moderate emphysema. Patchy ground-glass opacities throughout the right lung and dependent right lower lobe consolidation may reflect an infectious/inflammatory process. Somewhat nodular interlobular septal thickening at the medial right lung base may reflect lymphangitic  carcinomatosis. There are spiculated pulmonary nodules within the left upper lobe measuring 2.1 cm, right upper lobe measuring 1.3 cm, and posteroinferior right lower lobe measuring 1.5 cm as well as the superior segment of the right lower lobe measuring 1.1 cm. There is complete collapse of the left upper lobe and complete filling of the left upper lobar bronchi.   MUSCULOSKELETAL: No acute osseous abnormality or suspicious osseous lesions. Mild multilevel spinal degenerative change.   UPPER ABDOMEN: Unremarkable.       1. Esophageal stent in place with soft tissue attenuation material in the posterior mediastinum surrounding the esophagus and central airways, likely reflecting known locally advanced esophageal squamous cell carcinoma with possible additional superimposed confluent adenopathy. There is complete narrowing/filling of the left upper lobe airway with collapse/consolidation of the left upper lobe. There is partial filling/narrowing of the left lower lobe airway with extensive material filling the central bronchi of the left lower lobe. Extensive patchy ground-glass/consolidative opacities within the right lung concerning for an infectious/inflammatory process. 2. Moderate bilateral pleural effusions. 3. Spiculated bilateral pulmonary nodules, likely metastatic. 4. Small pericardial effusion. 5. Somewhat nodular interlobular septal thickening at the medial right lung base may reflect lymphangitic carcinomatosis.   Signed by: Chaitanya Lovell 5/7/2024 5:02 AM Dictation workstation:   [id number]    XR chest 1 view    Result Date: 5/7/2024  Interpreted By:  Russell Escalante, STUDY: XR CHEST 1 VIEW;  5/7/2024 2:29 am   INDICATION: Signs/Symptoms:Chest Pain.   COMPARISON: 04/06/2017   ACCESSION NUMBER(S): [id number]   ORDERING CLINICIAN: JENN NEWMAN   FINDINGS: There is an esophageal stent. There is a right chest port terminating over the cavoatrial junction.   There is a large left pleural  effusion. There is new bilateral hilar enlargement and nodularity likely representing lymphadenopathy. There is left hemithorax volume loss. There is airspace disease at the left lung base. There is diffuse peribronchovascular and interstitial prominence. No pneumothorax.       Multiple cardiopulmonary abnormalities including probable pulmonary edema, large left pleural effusion, bilateral hilar and mediastinal lymphadenopathy and probable airway obstruction on the left resulting in volume loss of the left hemithorax. CT chest with contrast is recommended for further evaluation.   Esophageal stent consistent with esophageal cancer.   MACRO: None.   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 M: CTBWO_3 CT Dose-Length Product (DLP): 1208  mGy*cm CT Dose Reduction Employed: Automated exposure control (AEC) COMPARISON:  CT brain 04/13/2024.  MRI brain 04/12/2024. RESULT: Acute change:  No evidence of an acute intracranial process. Hemorrhage:  No evidence of acute intracranial hemorrhage. Mass Lesion / Mass Effect: Again noted is the low attenuating mass lesion in the left medial parietal lobe with confluent surrounding vasogenic edema, intracranial metastasis, better delineated on the prior MR brain 04/12/2024.  Otherwise, no midline shift or herniation. Chronic change:  Patchy nonspecific supratentorial white matter changes likely reflecting chronic microvascular ischemia. Parenchyma:  Mild generalized parenchymal volume loss. Ventricles:  Commensurate with volume loss. Other:  The calvarium,  skull base, imaged paranasal sinuses, mastoids, orbits and extracranial soft tissues are unremarkable.  Stereotactic frame is in place.  (topogram) images:  No additional findings.    IMPRESSION: Localization exam.  No acute intracranial process. : VEENA   Transcribe Date/Time: Apr 16 2024 11:46A Dictated by : KAREY AVELAR DO This examination was interpreted and the report reviewed and electronically signed by: KAREY AVELAR DO on Apr 16 2024 11:49AM  EST    CT BRAIN STEREOLOCAL WO IVCON    Result Date: 4/13/2024  * * *Final Report* * * DATE OF EXAM: Apr 13 2024  2:31PM   [facility] [place]  -  CT BRAIN STEREOLOCAL WO IVCON  / ACCESSION #  [id number] PROCEDURE REASON: Other (document in comments)      * * * * Physician Interpretation * * * *  EXAMINATION:  CT STEREOLOCALIZATION BRAIN WITH CONTRAST HISTORY:  63 year old female with left parietal mass, preoperative stereo localization exam. TECHNIQUE: CT head high-resolution stereotactic localization without contrast. M: CTBWO_3 CT Dose-Length Product (DLP): 1336  mGy*cm CT Dose Reduction Employed: Automated exposure control (AEC) COMPARISON:  MR brain with and without contrast 04/12/2024. RESULT: The patient's known left parietal mass lesion is better demonstrated on the 04/12/2024 MRI with and without contrast.  There is surrounding vasogenic edema.  Mild local mass effect with partial effacement of the left occipital horn and atrium of the left lateral ventricle. No acute infarct or hemorrhage.  No midline shift or abnormal extra-axial fluid collection.  Scattered white matter hypoattenuation, nonspecific, however likely representing sequela of prior chronic microvascular ischemia.  Mild to moderate generalized parenchymal volume loss with commensurate prominence of the cortical sulci and ventricles.  Partial left lateral ventricular effacement, as above.  No hydrocephalus or ventricular trapping. Paranasal sinuses are clear.  Mastoid air cells and  middle ear cavities are clear bilaterally.  Bilateral orbits are within normal limits.   Overlying soft tissues demonstrate no focal abnormality.  No destructive calvarial lesion.  Patient is edentulous.    IMPRESSION: Stereotactic localization examination. Known left parietal lesion is better demonstrated on the 04/12/2024 MRI with and without contrast. No acute hemorrhage or large territorial infarct. : VEENA   Transcribe Date/Time: Apr 13 2024  2:33P Dictated by : RAJ DEGLADO MD This examination was interpreted and the report reviewed and electronically signed by: ROBERT HAMPTON MD on Apr 13 2024  2:50PM  EST    FL enema single contrast water soluble    Result Date: 4/12/2024  * * *Final Report* * * DATE OF EXAM: Apr 12 2024  2:28PM   [facility]   [id number]  -  XR COLON SINGLE CONTRAST  / ACCESSION #  [id number] PROCEDURE REASON: Assess for fistula      * * * * Physician Interpretation * * * *  WATER SOLUBLE CONTRAST ENEMA HISTORY: Assess for rectovaginal fistula. COMPARISON: CT abdomen and pelvis 02/27/2024 TECHNIQUE: Water soluble-contrast enema was performed after insertion of a rectal tube.  Spot and overhead images were obtained. Contrast: RECTAL:  400 ml of OMNIPAQUE 300 Fluoroscopy radiation summary: Fluoroscopy time: 1:30 (min:sec). Air kerma: 80.5 mGy. RESULT: : No dilated loops of bowel.  Bilateral pelvic tubal ligation clips.  Calcified uterine fibroids. Contrast refluxes to the level of the mid sigmoid colon.  There is no communication with the vagina.  Filling defects in the opacified colon consistent with feces. Examination ended due to patient discomfort and difficulty retaining the contrast material. The study was performed by CHANCE Bryson, under the supervision of Dr. Kaiser, who was present for the critical portion of the exam.   Images associated with this study were submitted for interpretation and reviewed by Dr. Kaiser. ===========    IMPRESSION: NO DEMONSTRATED  RECTOVAGINAL FISTULA. : VEENA   Transcribe Date/Time: Apr 12 2024  2:38P Dictated by : CHANCE SAL This examination was interpreted and the report reviewed and electronically signed by: MABEL BISHOP MD on Apr 12 2024  3:25PM  EST    MR brain w and wo IV contrast    Result Date: 4/12/2024  * * *Final Report* * * DATE OF EXAM: Apr 12 2024  5:39AM   [facility]   0295  -  MRI BRAIN WO/W IVCON  / ACCESSION #  [id number] PROCEDURE REASON: Metastatic disease evaluation      * * * * Physician Interpretation * * * *  EXAMINATION:  MRI BRAIN WO/W IVCON CLINICAL HISTORY: Gamma knife protocol preop localization exam. TECHNIQUE:  Limited Gamma knife protocol coronal T1 pre and post contrast, axial postcontrast T1, axial T2 sequence. Contrast:  11 mL [facility] IV COMPARISON: MRI of yesterday RESULT: Mass Lesion/ Mass Effect:    Peripherally enhancing cystic and solid right parietal mass measuring approximately 3 x 3 x 3 cm with more heterogenous solid components along its lateral margin.  Moderate surrounding vasogenic edema and mild locoregional mass effect.  No midline shift. No other abnormal enhancing brain lesions. Chronic Change:  Unchanged burden of supra and infratentorial chronic small vessel change. Parenchyma:   No significant volume loss for age. Ventricles:     Normal caliber and morphology. Vasculature:    Major intracranial arterial structures, and dural venous sinuses show typical flow void, suggesting patency by spin echo criteria. Other:  The visualized paranasal sinuses and mastoid air cells are clear.  The orbits and extracranial soft tissues are unremarkable.    IMPRESSION: Unchanged left parietal cystic and solid mass most compatible with metastasis.  No other enhancing brain lesions. : [facility]   Transcribe Date/Time: Apr 12 2024  6:13A Dictated by : ADIN DARBY MD This examination was interpreted and the report reviewed and electronically signed by: ADIN DARBY MD on  Apr 12 2024  6:30AM  EST    MR brain w and wo IV contrast    Result Date: 4/11/2024  * * *Final Report* * * DATE OF EXAM: Apr 10 2024 11:48PM   [facility]   0295  -  MRI BRAIN WO/W IVCON  / ACCESSION #  [id number] PROCEDURE REASON: Brain/CNS neoplasm, monitor      * * * * Physician Interpretation * * * *  EXAMINATION:  MRI BRAIN WO IVCON CLINICAL HISTORY: Brain CNS neoplasm. TECHNIQUE:  Routine brain MRI protocol without and with contrast including diffusion images. MQ:  [id number]_2 Contrast: None COMPARISON: CT brain 04/09/2024 RESULT: Truncated exam secondary to patient tolerance.  No postcontrast sequences are available. Acute Change:   There is no evidence of restricted diffusion to suggest an acute infarct.  There is some restricted diffusion around the periphery of the left parietal mass. Hemorrhage:    No evidence of prior parenchymal hemorrhage on the gradient echo images. Mass Lesion/ Mass Effect: 3 cm left parietal mass and moderate surrounding vasogenic edema. Chronic Change:  Scattered punctate foci of increased T2 and FLAIR signal are noted in the supratentorial and infratentorial white matter which is a nonspecific finding, but likely represents mild to moderate chronic microvascular ischemia. Parenchyma:   No significant volume loss for age. Ventricles:     Normal caliber and morphology. Skull Base:    Hypothalamic and pituitary region are grossly normal.   Craniocervical junction is normal. No significant marrow replacement process. Vasculature:    Major intracranial arterial structures, and dural venous sinuses show typical flow void, suggesting patency by spin echo criteria. Other:  No abnormal signal in the sinuses or mastoids.  The orbits and extracranial soft tissues are unremarkable.    IMPRESSION: Left parietal mass and surrounding vasogenic edema are unchanged from the CT of yesterday allowing for differences in modality. Truncated exam secondary to patient tolerance with no postcontrast imaging  limiting evaluation for additional metastatic disease. : VEENA   Transcribe Date/Time: Apr 11 2024 12:27A Dictated by : ADIN DARBY MD This examination was interpreted and the report reviewed and electronically signed by: ADIN DARBY MD on Apr 11 2024 12:40AM  EST    XR chest 1 view    Result Date: 4/10/2024  * * *Final Report* * * DATE OF EXAM: Apr 9 2024 11:00PM   [facility]   [id number]  -  XR CHEST 1V FRONTAL PORT  / ACCESSION #  [id number] PROCEDURE REASON: Shortness of breath      * * * * Physician Interpretation * * * *  EXAMINATION:  CHEST RADIOGRAPH (PORTABLE SINGLE VIEW AP) Exam Date/Time:  4/9/2024 11:00 PM Clinical History: Shortness of breath MQ:  XCPMC_6 Comparison:  Earlier the same day. RESULT: Lines, tubes, and devices:  Stable right-sided Port-A-Cath and esophageal stent.  Lungs and pleura:  No significant change of small left pleural effusion and associated basilar atelectasis.  Stable left parahilar opacity also related to atelectasis..  No pneumothorax.  Known metastatic lung nodules at the seen on prior chest CT.  No pneumothorax. Cardiomediastinal silhouette:  Stable cardiomediastinal silhouette. Other:  .    IMPRESSION: See result. : VEENA   Transcribe Date/Time: Apr 10 2024  7:11A Dictated by : SERENE HELM MD This examination was interpreted and the report reviewed and electronically signed by: SERENE HELM MD on Apr 10 2024  7:16AM  EST     Assessment/Plan   Shock, possibly septic  Acute hypoxic respiratory failure-differentials include lung collapse, pleural effusion, infection  Abnormal CT chest-pleural effusion, left-sided lung collapse, possible pneumonia  Klebsiella urinary tract infection  Metastatic esophageal cancer     IV Zosyn  IV vancomycin  Follow-up wound culture  Follow-up pleural fluid workup  Oxygen as needed  Supportive care  Monitor temperature and WBC       Mabel Mejia, APRN-CNP

## 2024-05-10 NOTE — CARE PLAN
The patient's goals for the shift include breath better    The clinical goals for the shift include maintain oxygen levels      Problem: Respiratory  Goal: Clear secretions with interventions this shift  Outcome: Progressing  Goal: Minimize anxiety/maximize coping throughout shift  Outcome: Progressing  Goal: Minimal/no exertional discomfort or dyspnea this shift  Outcome: Progressing  Goal: No signs of respiratory distress (eg. Use of accessory muscles. Peds grunting)  Outcome: Progressing  Goal: Patent airway maintained this shift  Outcome: Progressing  Goal: Tolerate mechanical ventilation evidenced by VS/agitation level this shift  Outcome: Progressing  Goal: Tolerate pulmonary toileting this shift  Outcome: Progressing  Goal: Verbalize decreased shortness of breath this shift  Outcome: Progressing  Goal: Wean oxygen to maintain O2 saturation per order/standard this shift  Outcome: Progressing  Goal: Increase self care and/or family involvement in next 24 hours  Outcome: Progressing     Problem: Skin  Goal: Decreased wound size/increased tissue granulation at next dressing change  Outcome: Progressing  Flowsheets (Taken 5/10/2024 1110)  Decreased wound size/increased tissue granulation at next dressing change:   Promote sleep for wound healing   Protective dressings over bony prominences  Goal: Participates in plan/prevention/treatment measures  Outcome: Progressing  Flowsheets (Taken 5/10/2024 1110)  Participates in plan/prevention/treatment measures:   Discuss with provider PT/OT consult   Increase activity/out of bed for meals  Goal: Prevent/manage excess moisture  Outcome: Progressing  Flowsheets (Taken 5/10/2024 1110)  Prevent/manage excess moisture:   Cleanse incontinence/protect with barrier cream   Follow provider orders for dressing changes   Monitor for/manage infection if present  Goal: Prevent/minimize sheer/friction injuries  Outcome: Progressing  Flowsheets (Taken 5/10/2024 1110)  Prevent/minimize  sheer/friction injuries:   Increase activity/out of bed for meals   HOB 30 degrees or less   Turn/reposition every 2 hours/use positioning/transfer devices  Goal: Promote/optimize nutrition  Outcome: Progressing  Flowsheets (Taken 5/10/2024 1110)  Promote/optimize nutrition:   Offer water/supplements/favorite foods   Monitor/record intake including meals  Goal: Promote skin healing  Outcome: Progressing  Flowsheets (Taken 5/10/2024 1110)  Promote skin healing:   Turn/reposition every 2 hours/use positioning/transfer devices   Protective dressings over bony prominences     Problem: Fall/Injury  Goal: Not fall by end of shift  Outcome: Progressing  Goal: Be free from injury by end of the shift  Outcome: Progressing  Goal: Verbalize understanding of personal risk factors for fall in the hospital  Outcome: Progressing  Goal: Verbalize understanding of risk factor reduction measures to prevent injury from fall in the home  Outcome: Progressing  Goal: Use assistive devices by end of the shift  Outcome: Progressing  Goal: Pace activities to prevent fatigue by end of the shift  Outcome: Progressing

## 2024-05-10 NOTE — CARE PLAN
The patient's goals for the shift include breath better    The clinical goals for the shift include maintain oxygen levels      Problem: Respiratory  Goal: Clear secretions with interventions this shift  Outcome: Progressing  Goal: Minimize anxiety/maximize coping throughout shift  Outcome: Progressing  Goal: Minimal/no exertional discomfort or dyspnea this shift  Outcome: Progressing  Goal: No signs of respiratory distress (eg. Use of accessory muscles. Peds grunting)  Outcome: Progressing  Goal: Patent airway maintained this shift  Outcome: Progressing  Goal: Tolerate mechanical ventilation evidenced by VS/agitation level this shift  Outcome: Progressing  Goal: Tolerate pulmonary toileting this shift  Outcome: Progressing  Goal: Verbalize decreased shortness of breath this shift  Outcome: Progressing  Goal: Wean oxygen to maintain O2 saturation per order/standard this shift  Outcome: Progressing  Goal: Increase self care and/or family involvement in next 24 hours  Outcome: Progressing     Problem: Skin  Goal: Decreased wound size/increased tissue granulation at next dressing change  Outcome: Progressing  Goal: Participates in plan/prevention/treatment measures  Outcome: Progressing  Goal: Prevent/manage excess moisture  Outcome: Progressing  Goal: Prevent/minimize sheer/friction injuries  Outcome: Progressing  Goal: Promote/optimize nutrition  Outcome: Progressing  Goal: Promote skin healing  Outcome: Progressing     Problem: Fall/Injury  Goal: Not fall by end of shift  Outcome: Progressing  Goal: Be free from injury by end of the shift  Outcome: Progressing  Goal: Verbalize understanding of personal risk factors for fall in the hospital  Outcome: Progressing  Goal: Verbalize understanding of risk factor reduction measures to prevent injury from fall in the home  Outcome: Progressing  Goal: Use assistive devices by end of the shift  Outcome: Progressing  Goal: Pace activities to prevent fatigue by end of the  shift  Outcome: Progressing

## 2024-05-10 NOTE — PROGRESS NOTES
"Debbi Segura is a 63 y.o. female on day 3 of admission presenting with Sepsis, due to unspecified organism, unspecified whether acute organ dysfunction present (Multi).    Subjective   Symptoms (0 - 10, Best to Worst)  [facility] Symptom Assessment System  Pain Score: 9  Today patient is on HFNC 30LPM/30Fio2. Reports increased L lung pain today. Increased anxiety, slept poorly overnight.        Objective     Vitals and nursing note reviewed.   Constitutional:       General: She is not in acute distress.     Appearance: She is ill-appearing.      Comments: Very thin and frail; appears much older than stated age   HENT:      Head: Normocephalic and atraumatic.      Mouth/Throat:      Mouth: Mucous membranes are dry.      Pharynx: Oropharynx is clear.   Eyes:      General: No scleral icterus.     Extraocular Movements: Extraocular movements intact.      Pupils: Pupils are equal, round, and reactive to light.   Neck:      Comments: Cervical rotation decreased  Cardiovascular:      Rate and Rhythm: Regular rhythm. Tachycardia present.      Pulses: Normal pulses.   Pulmonary:      Breath sounds: coarse sounds R lung, moist nonproductive cough     Comments: Respirations are shallow, even, slightly labored; on 30/50%HFNC  Abdominal:      General: Abdomen is flat.      Palpations: Abdomen is soft.      Comments: peg   Musculoskeletal:         General: No deformity.      Right lower leg: No edema.      Left lower leg: No edema.   Skin:     General: Skin is warm and dry.   Neurological:      General: No focal deficit present.      Mental Status: She is alert and oriented to person, place, and time.   Psychiatric:         Thought Content: Thought content normal.         Judgment: Judgment normal.      Comments: anxious tearful       Last Recorded Vitals  Blood pressure (!) 164/103, pulse (!) 126, temperature 35.7 °C (96.3 °F), temperature source Temporal, resp. rate (!) 28, height 1.676 m (5' 5.98\"), weight 51.9 kg (114 lb 6.7 " oz), SpO2 91%.  Intake/Output last 3 Shifts:  I/O last 3 completed shifts:  In: 928.7 (17.9 mL/kg) [I.V.:58.7 (1.1 mL/kg); NG/GT:570; IV Piggyback:300]  Out: 750 (14.5 mL/kg) [Urine:750 (0.4 mL/kg/hr)]  Weight: 51.9 kg     Relevant Results    Malnutrition Diagnosis Status: Ongoing  Malnutrition Diagnosis: Severe malnutrition related to chronic disease or condition  As Evidenced by: Severe subcutaneous fat and muscle wasting  I agree with the dietitian's malnutrition diagnosis.      Assessment/Plan   IMP:    Acute hypoxic respiratory failure - 2/2 large left pleural effusion. Bilat lung nodules noted on chest CT likely metastatic disease. Pulm consulted recommending thoracentesis.   Metastatic esophageal cancer - mets to brain and now lungs. Prognosis poor  Anxiety disorder - chronic, on Vistaril outpatient, continue  Brain metastatic disease on Keppra  Chronic pain - generalized resume home regimen with fentanyl Duragesic patch, nhi, prn tylenol and prn methocarbamol   Palliative Care    DNRCCA/DNI  Capable  Son Rio is only child, stated POA but we do not have this documentation. Regardless he is legal surrogate if needed.  Patient and son met with Hospice a few weeks ago when brain mets were identified. She was not ready to shift goals of care and wanted to proceed with radiation therapy at that time.   5/8   Care coordination was involved and she confirmed that patient had informational meeting with [facility], but did not admit to hospice. Per chart review, [facility] Palliative care and primary oncologist both recommended hospice for supportive care. I discussed goals with patient, and she verbalized worry about son and her cat. I refocused conversation to patient and she did not want to be in pain or struggle to breath. She is willing to accept the planned thoracentesis, but after discussion about the risks/benefits of CPR intubation, she did not want either done. I discussed with attending service, and  intensivist overnight had conversation with son in which he was also supportive of DNRCCA/DNI.     5/9   Reached out to son again today, he did answer her phone however stated that he had just woken up and asked if he could call me back later, awaiting callback from son.  Overall no significant improvement in patient condition, remains on high flow at current rate, per discussion with intensivist this appears to be primarily related to cancer.  Not likely to have a huge potential for a meaningful recovery, not even sure that patient can be titrated off the high flow at this point, need to discuss with son and patient both.  Prognosis is quite poor recommend hospice for supportive care, which is supported by intensivist service.    5/10  Spoke to son yesterday, son basically stated he would support whatever decision patient made. Patient in too much pain this am to follow up on hospice discussion yesterday. Will attempt to see again later when patient is more comfortable.     I spent 30 minutes in the professional and overall care of this patient.    Alicia Whitehead, APRN-CNP

## 2024-05-10 NOTE — NURSING NOTE
Upon rounding right chest mediport is accessed with dressing open to air. IVF/meds infusing. Pt states labs are being drawn from port without difficulty. Port dressing removed, site cleaned with CHG and new sterile dressing applied.

## 2024-05-10 NOTE — PROGRESS NOTES
HPI  @NAME 63 y.o.female seen resting in bed. Remains on high flow oxygen.     Vital signs in last 24 hours:  Temp:  [35.7 °C (96.3 °F)-36.7 °C (98.1 °F)] 36.7 °C (98.1 °F)  Heart Rate:  [] 105  Resp:  [12-42] 22  BP: (111-164)/() 127/88  FiO2 (%):  [40 %-97 %] 97 %    Intake/Output last 3 shifts:  I/O last 3 completed shifts:  In: 928.7 (17.9 mL/kg) [I.V.:58.7 (1.1 mL/kg); NG/GT:570; IV Piggyback:300]  Out: 750 (14.5 mL/kg) [Urine:750 (0.4 mL/kg/hr)]  Weight: 51.9 kg   Intake/Output this shift:  I/O this shift:  In: -   Out: 700 [Urine:700]    Physical Exam  Constitutional:       Appearance: She is ill-appearing.   HENT:      Head: Normocephalic and atraumatic.      Mouth/Throat:      Mouth: Mucous membranes are dry.   Eyes:      Extraocular Movements: Extraocular movements intact.      Pupils: Pupils are equal, round, and reactive to light.   Cardiovascular:      Rate and Rhythm: Normal rate and regular rhythm.      Pulses: Normal pulses.      Heart sounds: Normal heart sounds.   Pulmonary:      Effort: Pulmonary effort is normal.   Abdominal:      General: Bowel sounds are normal.   Musculoskeletal:         General: Normal range of motion.      Cervical back: Normal range of motion and neck supple.   Skin:     General: Skin is warm and dry.   Neurological:      General: No focal deficit present.      Mental Status: She is oriented to person, place, and time.   Psychiatric:         Mood and Affect: Mood normal.         Current Facility-Administered Medications   Medication Dose Route Frequency Provider Last Rate Last Admin    acetaminophen (Tylenol) tablet 650 mg  650 mg oral q4h PRN Bill E Hipps, PA-C   650 mg at 05/07/24 0952    Or    acetaminophen (Tylenol) oral liquid 650 mg  650 mg oral q4h PRN Bill E Hipps, PA-C   650 mg at 05/10/24 0804    Or    acetaminophen (Tylenol) suppository 650 mg  650 mg rectal q4h PRN Bill E Hipps, PA-C        acetylcysteine (Mucomyst) 200 mg/mL (20 %) nebulizer  solution 600 mg  3 mL nebulization 4x daily Bill E Cleos, PA-C   600 mg at 05/10/24 1100    amLODIPine (Norvasc) tablet 10 mg  10 mg oral Daily Russell Deal MD   10 mg at 05/07/24 0952    artificial saliva (yerbas-lyt) aerosol,spray 5 mL  5 mL mucous membrane q1h PRN Alicia Whitehead, RUTH-CNP   5 mL at 05/10/24 0804    aspirin chewable tablet 81 mg  81 mg oral Once Bill E Hipps, PA-C        atorvastatin (Lipitor) tablet 20 mg  20 mg oral Nightly Bill E Hipps, PA-C   20 mg at 05/09/24 2107    brimonidine (AlphaGAN) 0.2 % ophthalmic solution 1 drop  1 drop Right Eye BID Bill E Cleos, PA-C   1 drop at 05/10/24 0925    dextrose 50 % injection 12.5 g  12.5 g intravenous q15 min PRN Bill E Cristo, PA-C        dextrose 50 % injection 25 g  25 g intravenous q15 min PRN Bill Lemons, PA-C        enoxaparin (Lovenox) syringe 40 mg  40 mg subcutaneous Daily Bill Lemons PA-C   40 mg at 05/10/24 0925    pantoprazole (ProtoNix) EC tablet 40 mg  40 mg oral Daily Andrew Agosto PA-C        Or    esomeprazole (NexIUM) suspension 40 mg  40 mg nasoduodenal tube Daily Andrew Agosto PA-C        Or    pantoprazole (ProtoNix) injection 40 mg  40 mg intravenous Daily Andrew Agosto PA-C   40 mg at 05/10/24 0925    fentaNYL (Duragesic) 12 mcg/hr patch 1 patch  1 patch transdermal q72h Bill E Cleos, PA-C   1 patch at 05/10/24 1402    fentaNYL (Duragesic) 50 mcg/hr patch 1 patch  1 patch transdermal q72h Bill Lemons, PA-C   1 patch at 05/10/24 1402    [Held by provider] furosemide (Lasix) injection 40 mg  40 mg intravenous Daily Bill Lemons, PA-C   40 mg at 05/07/24 0952    gabapentin (Neurontin) solution 250 mg  250 mg oral BID Andrew Agosto PA-C   250 mg at 05/10/24 0924    gabapentin (Neurontin) solution 500 mg  500 mg oral Nightly Andrew Agosto PA-C   500 mg at 05/09/24 2110    glucagon (Glucagen) injection 1 mg  1 mg intramuscular q15 min PRN Bill Lemons PA-C        glucagon (Glucagen) injection 1 mg  1 mg  intramuscular q15 min PRN Bill Lemons, PA-C        hydrocodone-homatropine (Hycodan) 5-1.5 mg/5 mL syrup 5 mL  5 mL g-tube q6h PRN Alicia Whitehead APRN-CNP   5 mL at 05/10/24 1236    HYDROmorphone (Dilaudid) injection 0.2 mg  0.2 mg intravenous q6h PRN Alicia Whitehead, APRN-CNP   0.2 mg at 05/10/24 1043    hydrOXYzine HCL (Atarax) tablet 25 mg  25 mg j-tube q6h PRN Bill Lemons, PA-C   25 mg at 05/10/24 1044    insulin lispro (HumaLOG) injection 0-5 Units  0-5 Units subcutaneous q4h Bill Lemons PA-C   1 Units at 05/10/24 1229    ipratropium-albuteroL (Duo-Neb) 0.5-2.5 mg/3 mL nebulizer solution 3 mL  3 mL nebulization q4h while awake Bill Lemons PA-C   3 mL at 05/10/24 1100    levETIRAcetam (Keppra) 100 mg/mL solution 750 mg  750 mg g-tube BID Bill Lemons PA-C   750 mg at 05/10/24 0925    [Held by provider] lisinopril tablet 20 mg  20 mg oral Daily Bill Lemons PA-C   20 mg at 05/07/24 0952    methocarbamol (Robaxin) tablet 500 mg  500 mg g-tube q8h PRN Bill Lemons PA-C   500 mg at 05/09/24 2107    methylPREDNISolone sod succinate (SOLU-Medrol) 40 mg/mL injection 40 mg  40 mg intravenous q8h Andrew Agosto PA-C   40 mg at 05/10/24 1228    midodrine (Proamatine) tablet 10 mg  10 mg oral TID with meals Andrew Agosto PA-C   10 mg at 05/10/24 0804    ondansetron (Zofran) tablet 8 mg  8 mg g-tube q8h PRN Bill Lemons PA-C        oxygen (O2) therapy   inhalation Continuous - Inhalation Eric Alberto MD   30 percent at 05/10/24 0800    oxygen (O2) therapy   inhalation Continuous PRN - O2/gases Andrew Agosto PA-C        piperacillin-tazobactam-dextrose (Zosyn) IV 3.375 g  3.375 g intravenous q6h Bill Lemons PA-C   Stopped at 05/10/24 0955    polyethylene glycol (Glycolax, Miralax) packet 17 g  17 g oral Daily PRN RUTH Desai-CNP        psyllium (Metamucil) 3.4 gram packet 1 packet  1 packet oral Daily RUTH Desai-CNP   1 packet at 05/09/24 1442    sennosides-docusate  sodium (Shivani-Colace) 8.6-50 mg per tablet 2 tablet  2 tablet g-tube BID Alicia Whitehead, APRN-CNP        thiamine (Vitamin B-1) tablet 100 mg  100 mg oral Daily Bill E Hipps, PA-C   100 mg at 05/10/24 0925    vancomycin (Vancocin) pharmacy to dose - pharmacy monitoring   miscellaneous Daily PRN Bill E Hipps, PA-C        vancomycin (Xellia) 1 g in 200 mL (Xellia) IVPB 1 g  1 g intravenous q24h Bill E Hipps, PA-C   Stopped at 05/10/24 0126       Labs    Lab Results   Component Value Date    GLUCOSE 134 (H) 05/10/2024    CALCIUM 10.8 (H) 05/10/2024     05/10/2024    K 4.3 05/10/2024    CO2 22 (L) 05/10/2024     05/10/2024    BUN 24 05/10/2024    CREATININE 0.90 05/10/2024     Lab Results   Component Value Date    WBC 14.6 (H) 05/10/2024    HGB 9.2 (L) 05/10/2024    HCT 30.2 (L) 05/10/2024    MCV 81 05/10/2024     05/10/2024     Lab Results   Component Value Date    INR 1.0 12/29/2021    PROTIME 11.0 12/29/2021       Principal Problem:    Sepsis, due to unspecified organism, unspecified whether acute organ dysfunction present (Multi)  Active Problems:    Acute respiratory failure with hypoxia (Multi)    Pleural effusion, bilateral    Esophageal cancer (Multi)      Assessment and Plan  Acute hypoxic resp failure with malignant bilateral pleural effusions in the setting of advanced metastatic esophageal carcinoma. Overall poor prognosis. Remains on high flow oxygen. Palliative consulted.   Sepsis   Anxiety  Chronic pain     LOS: 3 days     Russell Deal MD  05/10/24  2:41 PM

## 2024-05-10 NOTE — PROGRESS NOTES
"Nutrition Follow up Note    Nutrition Assessment      Patient sleeping soundly at time of visit. Spoke with RN. Tube feed running at goal with no issues. BL lung nodules on chest CT, noted likely metastatic disease. Patient with poor prognosis, hospice services recommended by Palliative Care.    Nutrition History:  Food and Nutrient History: PEG tube feeds     Food Allergies/Intolerances:   Milk  GI Symptoms: None  Oral Problems: Swallowing difficulty    Anthropometrics:  Ht: 167.6 cm (5' 5.98\"), Wt: 51.9 kg (114 lb 6.7 oz), BMI: 18.48  IBW/kg (Dietitian Calculated): 59.09 kg  Percent of IBW: 83 %     Weight Change:  Daily Weight  05/10/24 : 51.9 kg (114 lb 6.7 oz)  12/21/22 : 59.4 kg (131 lb)  10/19/22 : 63.1 kg (139 lb 3 oz)  08/02/22 : 58.5 kg (129 lb)  05/27/22 : 60.8 kg (134 lb)  04/28/22 : 61.2 kg (135 lb)  04/08/22 : 61.2 kg (135 lb)  08/27/20 : 62.1 kg (137 lb)  06/16/20 : 59.9 kg (132 lb)     Nutrition Focused Physical Exam Findings:   Subcutaneous Fat Loss  Orbital Fat Pads: Severe (dark circles, hollowing and loose skin)  Buccal Fat Pads: Severe (hollow, sunken and narrow face)  Triceps: Severe (negligible fat tissue)  Ribs: Defer    Muscle Wasting  Temporalis: Severe (hollowed scooping depression)  Pectoralis (Clavicular Region): Severe (protruding prominent clavicle)  Deltoid/Trapezius: Severe (squared shoulders, acromion process prominent)  Interosseous: Severe (depressed area between thumb and forefinger)  Trapezius/Infraspinatus/Supraspinatus (Scapular Region): Defer  Quadriceps: Defer  Gastrocnemius: Severe (minimal muscle definition)     Nutrition Significant Labs:  Lab Results   Component Value Date    WBC 14.6 (H) 05/10/2024    HGB 9.2 (L) 05/10/2024    HCT 30.2 (L) 05/10/2024     05/10/2024    CHOL 164 04/28/2022    TRIG 158 (H) 06/16/2020    HDL 53.6 04/28/2022    ALT 9 05/07/2024    AST 14 05/07/2024     05/10/2024    K 4.3 05/10/2024     05/10/2024    CREATININE 0.90 " 05/10/2024    BUN 24 05/10/2024    CO2 22 (L) 05/10/2024    TSH 1.46 08/02/2022    INR 1.0 12/29/2021    HGBA1C 4.9 04/28/2022     Nutrition Specific Medications:  acetylcysteine, 3 mL, nebulization, 4x daily  [Held by provider] amLODIPine, 10 mg, oral, Daily  aspirin, 81 mg, oral, Once  atorvastatin, 20 mg, oral, Nightly  brimonidine, 1 drop, Right Eye, BID  enoxaparin, 40 mg, subcutaneous, Daily  pantoprazole, 40 mg, oral, Daily   Or  esomeprazole, 40 mg, nasoduodenal tube, Daily   Or  pantoprazole, 40 mg, intravenous, Daily  fentaNYL, 1 patch, transdermal, q72h  fentaNYL, 1 patch, transdermal, q72h  [Held by provider] furosemide, 40 mg, intravenous, Daily  gabapentin, 250 mg, oral, BID  gabapentin, 500 mg, oral, Nightly  insulin lispro, 0-5 Units, subcutaneous, q4h  ipratropium-albuteroL, 3 mL, nebulization, q4h while awake  levETIRAcetam, 750 mg, g-tube, BID  [Held by provider] lisinopril, 20 mg, oral, Daily  methylPREDNISolone sodium succinate (PF), 40 mg, intravenous, q8h  midodrine, 10 mg, oral, TID with meals  oxygen, , inhalation, Continuous - Inhalation  piperacillin-tazobactam, 3.375 g, intravenous, q6h  psyllium, 1 packet, oral, Daily  sennosides-docusate sodium, 2 tablet, g-tube, BID  thiamine, 100 mg, oral, Daily  vancomycin (Xellia) 1 g in 200 mL, 1 g, intravenous, q24h         Dietary Orders (From admission, onward)       Start     Ordered    05/08/24 0935  Enteral feeding with NPO PEG (percutaneous endoscopic gastric); 80 (Start @40 mL/Hr and increase by 10 mL Q4H until goal); 100; Water; Tap water; Every 6 hours  Diet effective now        Question Answer Comment   Tube feeding formula: Product from home - please specify    Tube feed product from home: [employer] 1.0 (Hospital to provide)    Feeding route: PEG (percutaneous endoscopic gastric)    Tube feeding continuous rate (mL/hr): 80 Start @40 mL/Hr and increase by 10 mL Q4H until goal   Tube feeding flush (mL): 100    Flush type: Water    Water  type: Tap water    Flush frequency: Every 6 hours        05/08/24 0937                  Nutrition Support Intake provides: [employer] 1.0 goal rate @80 mL/Hr to provide 1920 calories, 95 gm protein, 1517 ml free water.     Estimated Needs:   Estimated Energy Needs  Total Energy Estimated Needs (kCal):  (8272-9332)  Total Estimated Energy Need per Day (kCal/kg):  (35-40)  Method for Estimating Needs: Actual Wt    Estimated Protein Needs  Total Protein Estimated Needs (g):  (59-98)  Total Protein Estimated Needs (g/kg):  (1.2-2.0)  Method for Estimating Needs: Actual Wt    Estimated Fluid Needs  Total Fluid Estimated Needs (mL):  (3007-3443)  Method for Estimating Needs: 1 mL/kcal      Nutrition Diagnosis   Nutrition Diagnosis:  Malnutrition Diagnosis  Patient has Malnutrition Diagnosis: Yes  Diagnosis Status: Ongoing  Malnutrition Diagnosis: Severe malnutrition related to chronic disease or condition  As Evidenced by: Severe subcutaneous fat and muscle wasting    Nutrition Diagnosis  Patient has Nutrition Diagnosis: Yes  Diagnosis Status (1): Resolved  Nutrition Diagnosis 1: Inadequate enteral nutrition infusion  Related to (1): decreased ability to consume sufficient energy  As Evidenced by (1): NPO     Nutrition Interventions/Recommendations   Nutrition Interventions and Recommendations:    Nutrition Prescription:  Individualized Nutrition Prescription Provided for : 0170-2763 calories,  gm protein to be provided via enteral nutrition    Nutrition Interventions:   Food and/or Nutrient Delivery Interventions  Interventions: Enteral intake  Enteral Intake: Other (Comment)  Goal: Provide as ordered    Education Documentation  No documentation found.         Nutrition Monitoring and Evaluation   Monitoring/Evaluation:   Food/Nutrient Related History Monitoring  Monitoring and Evaluation Plan: Enteral and parenteral nutrition intake  Enteral and Parenteral Nutrition Intake: Enteral nutrition  formula/solution  Criteria: Monitoring for tube feed tolerance    Body Composition/Growth/Weight History  Monitoring and Evaluation Plan: Weight  Weight: Measured weight  Criteria: Patient will maintain/gain weight       Time Spent/Follow-up:   Follow Up  Time Spent (min): 20 minutes  Last Date of Nutrition Visit: 05/10/24  Nutrition Follow-Up Needed?: 3-5 days  Follow up Comment: 5/13/24

## 2024-05-10 NOTE — NURSING NOTE
Assumed care of patient.  She is moaning out in pain.  She was given Tylenol and throat coat for sore throat.  She was incontinent of urine.  Patient was given a CHG bath and new linens.

## 2024-05-11 LAB
ANION GAP SERPL CALC-SCNC: 12 MMOL/L
BACTERIA BLD CULT: NORMAL
BACTERIA BLD CULT: NORMAL
BASOPHILS # BLD AUTO: 0.02 X10*3/UL (ref 0–0.1)
BASOPHILS NFR BLD AUTO: 0.1 %
BUN SERPL-MCNC: 28 MG/DL (ref 8–25)
CALCIUM SERPL-MCNC: 10.7 MG/DL (ref 8.5–10.4)
CHLORIDE SERPL-SCNC: 101 MMOL/L (ref 97–107)
CO2 SERPL-SCNC: 22 MMOL/L (ref 24–31)
CREAT SERPL-MCNC: 0.9 MG/DL (ref 0.4–1.6)
EGFRCR SERPLBLD CKD-EPI 2021: 72 ML/MIN/1.73M*2
EOSINOPHIL # BLD AUTO: 0 X10*3/UL (ref 0–0.7)
EOSINOPHIL NFR BLD AUTO: 0 %
ERYTHROCYTE [DISTWIDTH] IN BLOOD BY AUTOMATED COUNT: 18.8 % (ref 11.5–14.5)
GLUCOSE BLD MANUAL STRIP-MCNC: 105 MG/DL (ref 74–99)
GLUCOSE BLD MANUAL STRIP-MCNC: 127 MG/DL (ref 74–99)
GLUCOSE BLD MANUAL STRIP-MCNC: 150 MG/DL (ref 74–99)
GLUCOSE BLD MANUAL STRIP-MCNC: 99 MG/DL (ref 74–99)
GLUCOSE BLD MANUAL STRIP-MCNC: 99 MG/DL (ref 74–99)
GLUCOSE SERPL-MCNC: 115 MG/DL (ref 65–99)
HCT VFR BLD AUTO: 32.5 % (ref 36–46)
HGB BLD-MCNC: 9.9 G/DL (ref 12–16)
IMM GRANULOCYTES # BLD AUTO: 0.27 X10*3/UL (ref 0–0.7)
IMM GRANULOCYTES NFR BLD AUTO: 1.7 % (ref 0–0.9)
LYMPHOCYTES # BLD AUTO: 0.67 X10*3/UL (ref 1.2–4.8)
LYMPHOCYTES NFR BLD AUTO: 4.2 %
MAGNESIUM SERPL-MCNC: 1.9 MG/DL (ref 1.6–3.1)
MCH RBC QN AUTO: 24.8 PG (ref 26–34)
MCHC RBC AUTO-ENTMCNC: 30.5 G/DL (ref 32–36)
MCV RBC AUTO: 82 FL (ref 80–100)
MONOCYTES # BLD AUTO: 0.77 X10*3/UL (ref 0.1–1)
MONOCYTES NFR BLD AUTO: 4.8 %
NEUTROPHILS # BLD AUTO: 14.41 X10*3/UL (ref 1.2–7.7)
NEUTROPHILS NFR BLD AUTO: 89.2 %
NRBC BLD-RTO: 0.2 /100 WBCS (ref 0–0)
PHOSPHATE SERPL-MCNC: 2.3 MG/DL (ref 2.5–4.5)
PLATELET # BLD AUTO: 395 X10*3/UL (ref 150–450)
POTASSIUM SERPL-SCNC: 4.7 MMOL/L (ref 3.4–5.1)
RBC # BLD AUTO: 3.99 X10*6/UL (ref 4–5.2)
SODIUM SERPL-SCNC: 135 MMOL/L (ref 133–145)
WBC # BLD AUTO: 16.1 X10*3/UL (ref 4.4–11.3)

## 2024-05-11 PROCEDURE — 2500000001 HC RX 250 WO HCPCS SELF ADMINISTERED DRUGS (ALT 637 FOR MEDICARE OP)

## 2024-05-11 PROCEDURE — 94640 AIRWAY INHALATION TREATMENT: CPT

## 2024-05-11 PROCEDURE — 2500000005 HC RX 250 GENERAL PHARMACY W/O HCPCS: Performed by: INTERNAL MEDICINE

## 2024-05-11 PROCEDURE — 2500000002 HC RX 250 W HCPCS SELF ADMINISTERED DRUGS (ALT 637 FOR MEDICARE OP, ALT 636 FOR OP/ED)

## 2024-05-11 PROCEDURE — 2500000004 HC RX 250 GENERAL PHARMACY W/ HCPCS (ALT 636 FOR OP/ED)

## 2024-05-11 PROCEDURE — 94660 CPAP INITIATION&MGMT: CPT

## 2024-05-11 PROCEDURE — 2500000004 HC RX 250 GENERAL PHARMACY W/ HCPCS (ALT 636 FOR OP/ED): Performed by: INTERNAL MEDICINE

## 2024-05-11 PROCEDURE — 82947 ASSAY GLUCOSE BLOOD QUANT: CPT

## 2024-05-11 PROCEDURE — 84100 ASSAY OF PHOSPHORUS: CPT

## 2024-05-11 PROCEDURE — 9420000001 HC RT PATIENT EDUCATION 5 MIN

## 2024-05-11 PROCEDURE — 2060000001 HC INTERMEDIATE ICU ROOM DAILY

## 2024-05-11 PROCEDURE — C9113 INJ PANTOPRAZOLE SODIUM, VIA: HCPCS

## 2024-05-11 PROCEDURE — 2500000001 HC RX 250 WO HCPCS SELF ADMINISTERED DRUGS (ALT 637 FOR MEDICARE OP): Performed by: NURSE PRACTITIONER

## 2024-05-11 PROCEDURE — 83735 ASSAY OF MAGNESIUM: CPT

## 2024-05-11 PROCEDURE — 80048 BASIC METABOLIC PNL TOTAL CA: CPT

## 2024-05-11 PROCEDURE — 99233 SBSQ HOSP IP/OBS HIGH 50: CPT | Performed by: NURSE PRACTITIONER

## 2024-05-11 PROCEDURE — 2500000005 HC RX 250 GENERAL PHARMACY W/O HCPCS

## 2024-05-11 PROCEDURE — 85025 COMPLETE CBC W/AUTO DIFF WBC: CPT

## 2024-05-11 PROCEDURE — 2500000001 HC RX 250 WO HCPCS SELF ADMINISTERED DRUGS (ALT 637 FOR MEDICARE OP): Performed by: INTERNAL MEDICINE

## 2024-05-11 RX ORDER — HYDROMORPHONE HYDROCHLORIDE 1 MG/ML
1 INJECTION, SOLUTION INTRAMUSCULAR; INTRAVENOUS; SUBCUTANEOUS EVERY 4 HOURS PRN
Status: DISCONTINUED | OUTPATIENT
Start: 2024-05-11 | End: 2024-05-14

## 2024-05-11 RX ORDER — HYDROMORPHONE HYDROCHLORIDE 1 MG/ML
1 INJECTION, SOLUTION INTRAMUSCULAR; INTRAVENOUS; SUBCUTANEOUS EVERY 6 HOURS PRN
Status: DISCONTINUED | OUTPATIENT
Start: 2024-05-11 | End: 2024-05-11

## 2024-05-11 RX ORDER — ACETYLCYSTEINE 200 MG/ML
3 SOLUTION ORAL; RESPIRATORY (INHALATION)
Status: DISCONTINUED | OUTPATIENT
Start: 2024-05-11 | End: 2024-05-13

## 2024-05-11 RX ADMIN — GABAPENTIN 500 MG: 250 SOLUTION ORAL at 21:53

## 2024-05-11 RX ADMIN — Medication 5 ML: at 22:41

## 2024-05-11 RX ADMIN — PIPERACILLIN SODIUM AND TAZOBACTAM SODIUM 3.38 G: 3; .375 INJECTION, SOLUTION INTRAVENOUS at 22:52

## 2024-05-11 RX ADMIN — IPRATROPIUM BROMIDE AND ALBUTEROL SULFATE 3 ML: 2.5; .5 SOLUTION RESPIRATORY (INHALATION) at 14:37

## 2024-05-11 RX ADMIN — Medication 30 L/MIN: at 08:28

## 2024-05-11 RX ADMIN — HYDROCODONE BITARTRATE AND HOMATROPINE METHYLBROMIDE 5 ML: 5; 1.5 SOLUTION ORAL at 08:31

## 2024-05-11 RX ADMIN — BRIMONIDINE TARTRATE 1 DROP: 2 SOLUTION/ DROPS OPHTHALMIC at 22:33

## 2024-05-11 RX ADMIN — ACETAMINOPHEN 650 MG: 160 SOLUTION ORAL at 21:52

## 2024-05-11 RX ADMIN — PANTOPRAZOLE SODIUM 40 MG: 40 INJECTION, POWDER, FOR SOLUTION INTRAVENOUS at 10:15

## 2024-05-11 RX ADMIN — ONDANSETRON HYDROCHLORIDE 8 MG: 4 TABLET, FILM COATED ORAL at 22:33

## 2024-05-11 RX ADMIN — HYDROCODONE BITARTRATE AND HOMATROPINE METHYLBROMIDE 5 ML: 5; 1.5 SOLUTION ORAL at 22:33

## 2024-05-11 RX ADMIN — MIDODRINE HYDROCHLORIDE 10 MG: 10 TABLET ORAL at 08:31

## 2024-05-11 RX ADMIN — HYDROXYZINE HYDROCHLORIDE 25 MG: 25 TABLET, FILM COATED ORAL at 04:42

## 2024-05-11 RX ADMIN — HYDROCODONE BITARTRATE AND HOMATROPINE METHYLBROMIDE 5 ML: 5; 1.5 SOLUTION ORAL at 00:31

## 2024-05-11 RX ADMIN — ENOXAPARIN SODIUM 40 MG: 40 INJECTION SUBCUTANEOUS at 10:11

## 2024-05-11 RX ADMIN — HYDROMORPHONE HYDROCHLORIDE 1 MG: 1 INJECTION, SOLUTION INTRAMUSCULAR; INTRAVENOUS; SUBCUTANEOUS at 12:55

## 2024-05-11 RX ADMIN — PIPERACILLIN SODIUM AND TAZOBACTAM SODIUM 3.38 G: 3; .375 INJECTION, SOLUTION INTRAVENOUS at 04:14

## 2024-05-11 RX ADMIN — IPRATROPIUM BROMIDE AND ALBUTEROL SULFATE 3 ML: 2.5; .5 SOLUTION RESPIRATORY (INHALATION) at 23:55

## 2024-05-11 RX ADMIN — METHOCARBAMOL 500 MG: 500 TABLET ORAL at 10:59

## 2024-05-11 RX ADMIN — PIPERACILLIN SODIUM AND TAZOBACTAM SODIUM 3.38 G: 3; .375 INJECTION, SOLUTION INTRAVENOUS at 10:13

## 2024-05-11 RX ADMIN — METHYLPREDNISOLONE SODIUM SUCCINATE 40 MG: 40 INJECTION, POWDER, FOR SOLUTION INTRAMUSCULAR; INTRAVENOUS at 04:14

## 2024-05-11 RX ADMIN — HYDROXYZINE HYDROCHLORIDE 25 MG: 25 TABLET, FILM COATED ORAL at 22:52

## 2024-05-11 RX ADMIN — BRIMONIDINE TARTRATE 1 DROP: 2 SOLUTION/ DROPS OPHTHALMIC at 10:15

## 2024-05-11 RX ADMIN — IPRATROPIUM BROMIDE AND ALBUTEROL SULFATE 3 ML: 2.5; .5 SOLUTION RESPIRATORY (INHALATION) at 19:38

## 2024-05-11 RX ADMIN — Medication 100 MG: at 10:15

## 2024-05-11 RX ADMIN — ACETYLCYSTEINE 600 MG: 200 SOLUTION ORAL; RESPIRATORY (INHALATION) at 08:25

## 2024-05-11 RX ADMIN — ACETAMINOPHEN 650 MG: 325 TABLET ORAL at 04:42

## 2024-05-11 RX ADMIN — LEVETIRACETAM 750 MG: 100 SOLUTION ORAL at 22:33

## 2024-05-11 RX ADMIN — IPRATROPIUM BROMIDE AND ALBUTEROL SULFATE 3 ML: 2.5; .5 SOLUTION RESPIRATORY (INHALATION) at 11:04

## 2024-05-11 RX ADMIN — PIPERACILLIN SODIUM AND TAZOBACTAM SODIUM 3.38 G: 3; .375 INJECTION, SOLUTION INTRAVENOUS at 17:09

## 2024-05-11 RX ADMIN — ATORVASTATIN CALCIUM 20 MG: 20 TABLET, FILM COATED ORAL at 21:53

## 2024-05-11 RX ADMIN — GABAPENTIN 250 MG: 250 SOLUTION ORAL at 14:17

## 2024-05-11 RX ADMIN — HYDROMORPHONE HYDROCHLORIDE 1 MG: 1 INJECTION, SOLUTION INTRAMUSCULAR; INTRAVENOUS; SUBCUTANEOUS at 17:29

## 2024-05-11 RX ADMIN — HYDROMORPHONE HYDROCHLORIDE 1 MG: 1 INJECTION, SOLUTION INTRAMUSCULAR; INTRAVENOUS; SUBCUTANEOUS at 06:31

## 2024-05-11 RX ADMIN — HYDROMORPHONE HYDROCHLORIDE 1 MG: 1 INJECTION, SOLUTION INTRAMUSCULAR; INTRAVENOUS; SUBCUTANEOUS at 22:33

## 2024-05-11 RX ADMIN — GABAPENTIN 250 MG: 250 SOLUTION ORAL at 10:18

## 2024-05-11 RX ADMIN — LEVETIRACETAM 750 MG: 100 SOLUTION ORAL at 10:11

## 2024-05-11 RX ADMIN — IPRATROPIUM BROMIDE AND ALBUTEROL SULFATE 3 ML: 2.5; .5 SOLUTION RESPIRATORY (INHALATION) at 08:25

## 2024-05-11 RX ADMIN — PSYLLIUM HUSK 1 PACKET: 3.4 POWDER ORAL at 10:12

## 2024-05-11 RX ADMIN — HYDROMORPHONE HYDROCHLORIDE 1 MG: 1 INJECTION, SOLUTION INTRAMUSCULAR; INTRAVENOUS; SUBCUTANEOUS at 00:31

## 2024-05-11 RX ADMIN — DOCUSATE SODIUM 50 MG AND SENNOSIDES 8.6 MG 2 TABLET: 8.6; 5 TABLET, FILM COATED ORAL at 10:12

## 2024-05-11 RX ADMIN — Medication 5 L/MIN: at 20:00

## 2024-05-11 RX ADMIN — AMLODIPINE BESYLATE 10 MG: 10 TABLET ORAL at 10:10

## 2024-05-11 ASSESSMENT — PAIN - FUNCTIONAL ASSESSMENT

## 2024-05-11 ASSESSMENT — PAIN SCALES - WONG BAKER: WONGBAKER_NUMERICALRESPONSE: NO HURT

## 2024-05-11 ASSESSMENT — PAIN SCALES - GENERAL
PAINLEVEL_OUTOF10: 2
PAINLEVEL_OUTOF10: 0 - NO PAIN
PAINLEVEL_OUTOF10: 0 - NO PAIN
PAINLEVEL_OUTOF10: 8
PAINLEVEL_OUTOF10: 8
PAINLEVEL_OUTOF10: 2
PAINLEVEL_OUTOF10: 0 - NO PAIN
PAINLEVEL_OUTOF10: 4
PAINLEVEL_OUTOF10: 2
PAINLEVEL_OUTOF10: 9
PAINLEVEL_OUTOF10: 2
PAINLEVEL_OUTOF10: 9
PAINLEVEL_OUTOF10: 8
PAINLEVEL_OUTOF10: 3
PAINLEVEL_OUTOF10: 8

## 2024-05-11 ASSESSMENT — PAIN DESCRIPTION - DESCRIPTORS
DESCRIPTORS: ACHING;DISCOMFORT;SORE
DESCRIPTORS: ACHING;DISCOMFORT
DESCRIPTORS: ACHING
DESCRIPTORS: ACHING;DISCOMFORT;SORE
DESCRIPTORS: SHARP;SHOOTING
DESCRIPTORS: ACHING
DESCRIPTORS: ACHING
DESCRIPTORS: ACHING;DISCOMFORT;SORE
DESCRIPTORS: ACHING;SORE;DISCOMFORT
DESCRIPTORS: ACHING

## 2024-05-11 ASSESSMENT — PAIN DESCRIPTION - LOCATION: LOCATION: HEAD

## 2024-05-11 ASSESSMENT — ENCOUNTER SYMPTOMS
SHORTNESS OF BREATH: 1
COUGH: 1

## 2024-05-11 ASSESSMENT — PAIN DESCRIPTION - ORIENTATION
ORIENTATION: MID
ORIENTATION: MID;LOWER

## 2024-05-11 ASSESSMENT — ACTIVITIES OF DAILY LIVING (ADL): EFFECT OF PAIN ON DAILY ACTIVITIES: DECREASED ACTIVITY

## 2024-05-11 NOTE — PROGRESS NOTES
Debbi Segura is a 63 y.o. female on day 4 of admission presenting with Sepsis, due to unspecified organism, unspecified whether acute organ dysfunction present (Multi).    Subjective   Interval History:   Afebrile, no chills  On high flow oxygen  Mild nonproductive cough  No chest pain  No nausea vomiting or diarrhea    Review of Systems   Respiratory:  Positive for cough and shortness of breath.    All other systems reviewed and are negative.      Objective   Range of Vitals (last 24 hours)  Heart Rate:  []   Temp:  [36.4 °C (97.5 °F)-36.8 °C (98.2 °F)]   Resp:  [4-33]   BP: (126-165)/()   Weight:  [51.9 kg (114 lb 6.7 oz)-53.7 kg (118 lb 6.2 oz)]   SpO2:  [93 %-100 %]   Daily Weight  05/11/24 : 53.7 kg (118 lb 6.2 oz)    Body mass index is 19.12 kg/m².    Physical Exam  Constitutional:       General: She is awake.      Appearance: She is ill-appearing.   HENT:      Head: Normocephalic and atraumatic.      Right Ear: External ear normal.      Left Ear: External ear normal.      Nose: Nose normal.   Eyes:      General: No scleral icterus.     Extraocular Movements: Extraocular movements intact.   Cardiovascular:      Rate and Rhythm: Tachycardia present.      Heart sounds: Normal heart sounds, S1 normal and S2 normal.   Pulmonary:      Breath sounds: Decreased breath sounds present.   Abdominal:      General: Bowel sounds are normal.      Palpations: Abdomen is soft.   Musculoskeletal:      Cervical back: Normal range of motion and neck supple.      Right lower leg: No edema.      Left lower leg: No edema.   Skin:     General: Skin is warm and dry.   Neurological:      Mental Status: She is alert.   Psychiatric:         Behavior: Behavior normal. Behavior is cooperative.     Antibiotics  aspirin chewable tablet 324 mg  famotidine PF (Pepcid) injection 20 mg  oxygen (O2) therapy  sodium chloride 0.9 % bolus 1,698 mL  vancomycin 1.5 g in 300 mL (Xellia) IVPB 1.5 g  furosemide (Lasix) injection 40  mg  iohexol (OMNIPaque) 350 mg iodine/mL solution 75 mL  furosemide (Lasix) injection 40 mg  aspirin chewable tablet 81 mg  acetaminophen (Tylenol) tablet 650 mg  acetaminophen (Tylenol) oral liquid 650 mg  acetaminophen (Tylenol) suppository 650 mg  enoxaparin (Lovenox) syringe 40 mg  polyethylene glycol (Glycolax, Miralax) packet 17 g  amLODIPine (Norvasc) tablet 10 mg  atorvastatin (Lipitor) tablet 20 mg  brimonidine (AlphaGAN) 0.2 % ophthalmic solution 1 drop  lisinopril tablet 20 mg  thiamine (Vitamin B-1) tablet 100 mg  piperacillin-tazobactam-dextrose (Zosyn) IV 3.375 g      methocarbamol (Robaxin) tablet  ondansetron (Zofran) tablet    pantoprazole (ProtoNix) EC tablet  hydrOXYzine (Atarax) tablet  levETIRAcetam (Keppra) 100 mg/mL solution 750 mg  hydrOXYzine HCL (Atarax) tablet 25 mg  methocarbamol (Robaxin) tablet 500 mg  ondansetron (Zofran) tablet 8 mg  sennosides (Senokot) tablet 17.2 mg  pantoprazole (ProtoNix) EC tablet 40 mg  ipratropium-albuteroL (Duo-Neb) 0.5-2.5 mg/3 mL nebulizer solution 3 mL  acetylcysteine (Mucomyst) 200 mg/mL (20 %) nebulizer solution 600 mg  fentaNYL (Duragesic) 50 mcg/hr patch 1 patch  fentaNYL (Duragesic) 12 mcg/hr patch 1 patch  gabapentin (Neurontin) solution 250 mg  gabapentin (Neurontin) solution 500 mg  methocarbamol (Robaxin) tablet 500 mg  vancomycin (Vancocin) pharmacy to dose - pharmacy monitoring  vancomycin (Xellia) 1 g in 200 mL (Xellia) IVPB 1 g  enoxaparin (Lovenox) syringe 40 mg  oxygen (O2) therapy  albumin human 5 % infusion 12.5 g  glucagon (Glucagen) injection 1 mg  dextrose 50 % injection 25 g  glucagon (Glucagen) injection 1 mg  dextrose 50 % injection 12.5 g  insulin lispro (HumaLOG) injection 0-5 Units  potassium chloride (Klor-Con) packet 20 mEq  pantoprazole (ProtoNix) EC tablet 40 mg  esomeprazole (NexIUM) suspension 40 mg  pantoprazole (ProtoNix) injection 40 mg  norepinephrine (Levophed) 8 mg in dextrose 5% 250 mL (0.032 mg/mL) infusion  "(premix)  methylPREDNISolone sod succinate (SOLU-Medrol) 40 mg/mL injection 40 mg  hydrocodone-homatropine (Hycodan) 5-1.5 mg/5 mL syrup 5 mL  artificial saliva (yerbas-lyt) aerosol,spray 5 mL  sennosides-docusate sodium (Shivani-Colace) 8.6-50 mg per tablet 2 tablet  lidocaine-prilocaine (Emla) cream  HYDROmorphone (Dilaudid) injection 0.2 mg  potassium phosphates 15 mmol in sodium chloride 0.9% 250 mL IV  magnesium sulfate IV 2 g  oxygen (O2) therapy  midodrine (Proamatine) tablet 10 mg  oxygen (O2) therapy  oxygen (O2) therapy  oxygen (O2) therapy  polyethylene glycol (Glycolax, Miralax) packet 17 g  psyllium (Metamucil) 3.4 gram packet 1 packet  HYDROmorphone (Dilaudid) injection 1 mg      Relevant Results  Labs  Results from last 72 hours   Lab Units 05/11/24  0413 05/10/24  0453 05/09/24  0456   WBC AUTO x10*3/uL 16.1* 14.6* 8.6   HEMOGLOBIN g/dL 9.9* 9.2* 9.1*   HEMATOCRIT % 32.5* 30.2* 29.6*   PLATELETS AUTO x10*3/uL 395 362 286   NEUTROS PCT AUTO % 89.2 90.1 91.0   LYMPHS PCT AUTO % 4.2 3.9 5.5   MONOS PCT AUTO % 4.8 5.1 2.8   EOS PCT AUTO % 0.0 0.0 0.0     Results from last 72 hours   Lab Units 05/11/24  0413 05/10/24  0453 05/09/24  0456   SODIUM mmol/L 135 136 138   POTASSIUM mmol/L 4.7 4.3 3.7   CHLORIDE mmol/L 101 102 103   CO2 mmol/L 22* 22* 22*   BUN mg/dL 28* 24 26*   CREATININE mg/dL 0.90 0.90 0.80   GLUCOSE mg/dL 115* 134* 175*   CALCIUM mg/dL 10.7* 10.8* 10.7*   ANION GAP mmol/L 12 12 13   EGFR mL/min/1.73m*2 72 72 82   PHOSPHORUS mg/dL 2.3* 2.2* 2.1*         Estimated Creatinine Clearance: 53.5 mL/min (by C-G formula based on SCr of 0.9 mg/dL).  No results found for: \"CRP\"  Microbiology  Susceptibility data from last 14 days.  Collected Specimen Info Organism Amoxicillin/Clavulanate Ampicillin Ampicillin/Sulbactam Cefazolin Cefazolin (uncomplicated UTIs only) Ceftriaxone Ciprofloxacin Gentamicin Nitrofurantoin Piperacillin/Tazobactam   05/08/24 Tissue/Biopsy from Wound/Tissue Mixed Gram-Positive " and Gram-Negative Bacteria             05/07/24 Urine from Clean Catch/Voided Klebsiella pneumoniae/variicola (1)  S  R  S  R  S  S  R  S  R  S     Klebsiella pneumoniae/variicola (2)  S  R  S  S  S   I  S  R  S     Collected Specimen Info Organism Trimethoprim/Sulfamethoxazole   05/08/24 Tissue/Biopsy from Wound/Tissue Mixed Gram-Positive and Gram-Negative Bacteria    05/07/24 Urine from Clean Catch/Voided Klebsiella pneumoniae/variicola (1)  S     Klebsiella pneumoniae/variicola (2)  S     Imaging  XR chest 1 view    Result Date: 5/9/2024  Interpreted By:  Russell Rucker, STUDY: XR CHEST 1 VIEW;  5/9/2024 8:37 am   INDICATION: Signs/Symptoms:hypoxia, eval pleural effusions.   COMPARISON: Most recent prior is from 05/07/2024. CT scan from 05/07/2024.   ACCESSION NUMBER(S): [id number]   ORDERING CLINICIAN: JESI LEON   TECHNIQUE: Single AP portable view of the chest was obtained.   FINDINGS: MEDIASTINUM/ LUNGS/ REGINE: Stable esophageal stent. Stable right-sided central venous MediPort. Progression of small right pleural effusion. Stable cardiomegaly. There are diffuse infiltrative opacities throughout the right lung. Stable collapse of the left upper lobe with hyper aeration of the left lower lobe. Persistent mild haziness at the left lung base. No blunting of the left lateral costophrenic sulcus.   BONES: No lytic or blastic destructive bone lesion.   UPPER ABDOMEN: Grossly intact.       Cardiomegaly.   Small right pleural effusion, mildly progressed.   Grossly stable infiltrative densities throughout the right lung.   Stable collapse of the left upper lobe with stable hyper aeration of the left lung. Mild stable infiltrate/atelectasis at the left lung base.   Stable esophageal stent. Stable right-sided central venous MediPort.   MACRO: None   Signed by: Russell Rucker 5/9/2024 10:37 AM Dictation workstation:   [id number]    XR chest 1 view    Result Date: 5/8/2024  Interpreted By:  Russell Escalante, STUDY: XR  CHEST 1 VIEW;  5/7/2024 10:18 pm   INDICATION: Signs/Symptoms:hypoxemia.   COMPARISON: CXR 05/07/2024, CT chest 05/07/2024   ACCESSION NUMBER(S): [id number]   ORDERING CLINICIAN: SERA SMITH   FINDINGS: There is worsening consolidation within the right upper lobe, right middle lobe. Similar consolidation in the right lower lobe. There is redemonstration of a prominent left basilar opacity representing sub pulmonic effusion. There is redemonstration of complete left upper lobe collapse and left hilar mass representing locally invasive esophageal cancer and malignant lymphadenopathy. No pneumothorax.       Please see above.     MACRO: None.   Signed by: Russell Escalante 5/8/2024 1:28 AM Dictation workstation:   [id number]    CT chest w IV contrast    Result Date: 5/7/2024  Interpreted By:  Chaitanya Lovell, STUDY: CT CHEST W IV CONTRAST;  5/7/2024 4:25 am   INDICATION: Signs/Symptoms:pleural effusion.   COMPARISON: 05/13/2022   ACCESSION NUMBER(S): [id number]   ORDERING CLINICIAN: JENN NEWMAN   TECHNIQUE: Helical data acquisition of the chest was obtained without intravenous contrast. Images were reformatted in axial, coronal, and sagittal planes.   FINDINGS: LOWER NECK AND CHEST WALL:  Right chest port catheter.   MEDIASTINUM/REGINE:No lymphadenopathy. Metallic esophageal stent in place. Trace debris within the stent lumen. Confluent soft tissue density within the posterior paratracheal and paraesophageal mediastinum may reflect confluence of adenopathy or locally advanced malignancy.   CARDIOVASCULAR:  Cardiac chamber size within normal limits. Small pericardial effusion. Right chest port catheter tip terminating at the SVC/RA junction. Aortic caliber normal. Normal caliber of main pulmonary artery. Scattered coronary atherosclerotic calcification.   LUNGS, AIRWAYS, AND PLEURA:  Severe focal narrowing of the left mainstem bronchus. There is complete occlusion/filling of the majority of the  left lower lobar and left lower lobe segmental bronchi. Moderate bilateral pleural effusions. Moderate emphysema. Patchy ground-glass opacities throughout the right lung and dependent right lower lobe consolidation may reflect an infectious/inflammatory process. Somewhat nodular interlobular septal thickening at the medial right lung base may reflect lymphangitic carcinomatosis. There are spiculated pulmonary nodules within the left upper lobe measuring 2.1 cm, right upper lobe measuring 1.3 cm, and posteroinferior right lower lobe measuring 1.5 cm as well as the superior segment of the right lower lobe measuring 1.1 cm. There is complete collapse of the left upper lobe and complete filling of the left upper lobar bronchi.   MUSCULOSKELETAL: No acute osseous abnormality or suspicious osseous lesions. Mild multilevel spinal degenerative change.   UPPER ABDOMEN: Unremarkable.       1. Esophageal stent in place with soft tissue attenuation material in the posterior mediastinum surrounding the esophagus and central airways, likely reflecting known locally advanced esophageal squamous cell carcinoma with possible additional superimposed confluent adenopathy. There is complete narrowing/filling of the left upper lobe airway with collapse/consolidation of the left upper lobe. There is partial filling/narrowing of the left lower lobe airway with extensive material filling the central bronchi of the left lower lobe. Extensive patchy ground-glass/consolidative opacities within the right lung concerning for an infectious/inflammatory process. 2. Moderate bilateral pleural effusions. 3. Spiculated bilateral pulmonary nodules, likely metastatic. 4. Small pericardial effusion. 5. Somewhat nodular interlobular septal thickening at the medial right lung base may reflect lymphangitic carcinomatosis.   Signed by: Chaitanya Lovell 5/7/2024 5:02 AM Dictation workstation:   [id number]    XR chest 1 view    Result Date:  5/7/2024  Interpreted By:  Russell Escalante, STUDY: XR CHEST 1 VIEW;  5/7/2024 2:29 am   INDICATION: Signs/Symptoms:Chest Pain.   COMPARISON: 04/06/2017   ACCESSION NUMBER(S): [id number]   ORDERING CLINICIAN: JNEN NEWMAN   FINDINGS: There is an esophageal stent. There is a right chest port terminating over the cavoatrial junction.   There is a large left pleural effusion. There is new bilateral hilar enlargement and nodularity likely representing lymphadenopathy. There is left hemithorax volume loss. There is airspace disease at the left lung base. There is diffuse peribronchovascular and interstitial prominence. No pneumothorax.       Multiple cardiopulmonary abnormalities including probable pulmonary edema, large left pleural effusion, bilateral hilar and mediastinal lymphadenopathy and probable airway obstruction on the left resulting in volume loss of the left hemithorax. CT chest with contrast is recommended for further evaluation.   Esophageal stent consistent with esophageal cancer.   MACRO: None.   Signed by: Russell Escalante 5/7/2024 2:37 AM Dictation workstation:   [id number]    CT BRAIN STEREOLOCAL WO IVCON    Result Date: 4/16/2024  * * *Final Report* * * DATE OF EXAM: Apr 16 2024 11:40AM   [facility]   0503  -  CT BRAIN STEREOLOCAL WO IVCON  / ACCESSION #  [id number] PROCEDURE REASON: Metastasis to brain (HCC)      * * * * Physician Interpretation * * * *  EXAMINATION:  CT BRAIN STEREOLOCAL WO IVCON HISTORY:  Metastasis to brain TECHNIQUE: CT head without contrast. M: CTBWO_3 CT Dose-Length Product (DLP): 1208  mGy*cm CT Dose Reduction Employed: Automated exposure control (AEC) COMPARISON:  CT brain 04/13/2024.  MRI brain 04/12/2024. RESULT: Acute change:  No evidence of an acute intracranial process. Hemorrhage:  No evidence of acute intracranial hemorrhage. Mass Lesion / Mass Effect: Again noted is the low attenuating mass lesion in the left medial parietal lobe with confluent surrounding  vasogenic edema, intracranial metastasis, better delineated on the prior MR brain 04/12/2024.  Otherwise, no midline shift or herniation. Chronic change:  Patchy nonspecific supratentorial white matter changes likely reflecting chronic microvascular ischemia. Parenchyma:  Mild generalized parenchymal volume loss. Ventricles:  Commensurate with volume loss. Other:  The calvarium, skull base, imaged paranasal sinuses, mastoids, orbits and extracranial soft tissues are unremarkable.  Stereotactic frame is in place.  (topogram) images:  No additional findings.    IMPRESSION: Localization exam.  No acute intracranial process. : VEENA   Transcribe Date/Time: Apr 16 2024 11:46A Dictated by : KAREY AVELAR DO This examination was interpreted and the report reviewed and electronically signed by: KAREY AVELAR DO on Apr 16 2024 11:49AM  EST    CT BRAIN STEREOLOCAL WO IVCON    Result Date: 4/13/2024  * * *Final Report* * * DATE OF EXAM: Apr 13 2024  2:31PM   [facility]   0503  -  CT BRAIN STEREOLOCAL WO IVCON  / ACCESSION #  [id number] PROCEDURE REASON: Other (document in comments)      * * * * Physician Interpretation * * * *  EXAMINATION:  CT STEREOLOCALIZATION BRAIN WITH CONTRAST HISTORY:  63 year old female with left parietal mass, preoperative stereo localization exam. TECHNIQUE: CT head high-resolution stereotactic localization without contrast. M: CTBWO_3 CT Dose-Length Product (DLP): 1336  mGy*cm CT Dose Reduction Employed: Automated exposure control (AEC) COMPARISON:  MR brain with and without contrast 04/12/2024. RESULT: The patient's known left parietal mass lesion is better demonstrated on the 04/12/2024 MRI with and without contrast.  There is surrounding vasogenic edema.  Mild local mass effect with partial effacement of the left occipital horn and atrium of the left lateral ventricle. No acute infarct or hemorrhage.  No midline shift or abnormal extra-axial fluid collection.  Scattered white matter  hypoattenuation, nonspecific, however likely representing sequela of prior chronic microvascular ischemia.  Mild to moderate generalized parenchymal volume loss with commensurate prominence of the cortical sulci and ventricles.  Partial left lateral ventricular effacement, as above.  No hydrocephalus or ventricular trapping. Paranasal sinuses are clear.  Mastoid air cells and middle ear cavities are clear bilaterally.  Bilateral orbits are within normal limits.   Overlying soft tissues demonstrate no focal abnormality.  No destructive calvarial lesion.  Patient is edentulous.    IMPRESSION: Stereotactic localization examination. Known left parietal lesion is better demonstrated on the 04/12/2024 MRI with and without contrast. No acute hemorrhage or large territorial infarct. : PSCB   Transcribe Date/Time: Apr 13 2024  2:33P Dictated by : RAJ DELGADO MD This examination was interpreted and the report reviewed and electronically signed by: ROBERT HAMPTON MD on Apr 13 2024  2:50PM  EST    FL enema single contrast water soluble    Result Date: 4/12/2024  * * *Final Report* * * DATE OF EXAM: Apr 12 2024  2:28PM   [facility]   5385  -  XR COLON SINGLE CONTRAST  / ACCESSION #  [id number] PROCEDURE REASON: Assess for fistula      * * * * Physician Interpretation * * * *  WATER SOLUBLE CONTRAST ENEMA HISTORY: Assess for rectovaginal fistula. COMPARISON: CT abdomen and pelvis 02/27/2024 TECHNIQUE: Water soluble-contrast enema was performed after insertion of a rectal tube.  Spot and overhead images were obtained. Contrast: RECTAL:  400 ml of OMNIPAQUE 300 Fluoroscopy radiation summary: Fluoroscopy time: 1:30 (min:sec). Air kerma: 80.5 mGy. RESULT: : No dilated loops of bowel.  Bilateral pelvic tubal ligation clips.  Calcified uterine fibroids. Contrast refluxes to the level of the mid sigmoid colon.  There is no communication with the vagina.  Filling defects in the opacified colon consistent with feces.  Examination ended due to patient discomfort and difficulty retaining the contrast material. The study was performed by CHANCE Sal, under the supervision of Dr. Kaiser, who was present for the critical portion of the exam.   Images associated with this study were submitted for interpretation and reviewed by Dr. Kaiser. ===========    IMPRESSION: NO DEMONSTRATED RECTOVAGINAL FISTULA. : PSCB   Transcribe Date/Time: Apr 12 2024  2:38P Dictated by : CHANCE SAL This examination was interpreted and the report reviewed and electronically signed by: MABEL KAISER MD on Apr 12 2024  3:25PM  EST    MR brain w and wo IV contrast    Result Date: 4/12/2024  * * *Final Report* * * DATE OF EXAM: Apr 12 2024  5:39AM   [facility]   0295  -  MRI BRAIN WO/W IVCON  / ACCESSION #  [id number] PROCEDURE REASON: Metastatic disease evaluation      * * * * Physician Interpretation * * * *  EXAMINATION:  MRI BRAIN WO/W IVCON CLINICAL HISTORY: Gamma knife protocol preop localization exam. TECHNIQUE:  Limited Gamma knife protocol coronal T1 pre and post contrast, axial postcontrast T1, axial T2 sequence. Contrast:  11 mL [facility] IV COMPARISON: MRI of yesterday RESULT: Mass Lesion/ Mass Effect:    Peripherally enhancing cystic and solid right parietal mass measuring approximately 3 x 3 x 3 cm with more heterogenous solid components along its lateral margin.  Moderate surrounding vasogenic edema and mild locoregional mass effect.  No midline shift. No other abnormal enhancing brain lesions. Chronic Change:  Unchanged burden of supra and infratentorial chronic small vessel change. Parenchyma:   No significant volume loss for age. Ventricles:     Normal caliber and morphology. Vasculature:    Major intracranial arterial structures, and dural venous sinuses show typical flow void, suggesting patency by spin echo criteria. Other:  The visualized paranasal sinuses and mastoid air cells are clear.  The orbits and  extracranial soft tissues are unremarkable.    IMPRESSION: Unchanged left parietal cystic and solid mass most compatible with metastasis.  No other enhancing brain lesions. : PSCB   Transcribe Date/Time: Apr 12 2024  6:13A Dictated by : ADIN DARBY MD This examination was interpreted and the report reviewed and electronically signed by: ADIN DARBY MD on Apr 12 2024  6:30AM  EST     Assessment/Plan   Shock, possibly septic, off pressors  Acute hypoxic respiratory failure-differentials include lung collapse, pleural effusion, infection, on high flow  Abnormal CT chest-pleural effusion, left-sided lung collapse, possible pneumonia  Infected PEG tube site-wound culture growing Staph aureus  Klebsiella urinary tract infection  Metastatic esophageal cancer     IV Zosyn  IV vancomycin  Follow-up PEG tube site wound culture  Oxygen as needed  Supportive care  Monitor temperature and WBC      Dylon Blackmon MD

## 2024-05-11 NOTE — CARE PLAN
The patient's goals for the shift include breath better    The clinical goals for the shift include maintain oxygen levels

## 2024-05-11 NOTE — CARE PLAN
The patient's goals for the shift include breath better    The clinical goals for the shift include maintain good pain control and oxygenation    Over the shift, the patient did not make progress toward the following goals. Barriers to progression include check oxygenation. Recommendations to address these barriers include monitor labs.

## 2024-05-11 NOTE — NURSING NOTE
Palliative care nurse in. Dr. Deal just in as well. Aware of pat status, pain control issues, high blood pressure, aware midrodine held at noon. Will place new orders.

## 2024-05-11 NOTE — PROGRESS NOTES
Palliative Care Progress Note    Date of Admission: 5/7/2024    Patient is a 63 y.o. female admitted with Sepsis, due to unspecified organism, unspecified whether acute organ dysfunction present (Multi).     Mental/Cognitive Status: awake, alert    Respiratory Status: remains on HFNC 30 L/30%    Pain Assessment:generalized, dilaudid helps but does not last long enough. She is experiencing breakthrough within 4 hours    Pertinent Symptoms: no n/v/c/d    Diet/Nutrition: peg    Bowel Regimen: miralax  prn    Patient's current condition/Anticipated Prognosis: poor.  Family/Healthcare Proxy involvement: son Rio.    Scheduled medications  acetylcysteine, 3 mL, nebulization, BID  amLODIPine, 10 mg, oral, Daily  aspirin, 81 mg, oral, Once  atorvastatin, 20 mg, oral, Nightly  brimonidine, 1 drop, Right Eye, BID  enoxaparin, 40 mg, subcutaneous, Daily  pantoprazole, 40 mg, oral, Daily   Or  esomeprazole, 40 mg, nasoduodenal tube, Daily   Or  pantoprazole, 40 mg, intravenous, Daily  fentaNYL, 1 patch, transdermal, q72h  fentaNYL, 1 patch, transdermal, q72h  [Held by provider] furosemide, 40 mg, intravenous, Daily  gabapentin, 250 mg, oral, BID  gabapentin, 500 mg, oral, Nightly  insulin lispro, 0-5 Units, subcutaneous, q4h  ipratropium-albuteroL, 3 mL, nebulization, q4h while awake  levETIRAcetam, 750 mg, g-tube, BID  [Held by provider] lisinopril, 20 mg, oral, Daily  midodrine, 10 mg, oral, TID with meals  oxygen, , inhalation, Continuous - Inhalation  piperacillin-tazobactam, 3.375 g, intravenous, q6h  psyllium, 1 packet, oral, Daily  sennosides-docusate sodium, 2 tablet, g-tube, BID  thiamine, 100 mg, oral, Daily  vancomycin (Xellia) 1 g in 200 mL, 1 g, intravenous, q24h      Continuous medications     PRN medications  PRN medications: acetaminophen **OR** acetaminophen **OR** acetaminophen, artificial saliva (yerbas-lyt), dextrose, dextrose, glucagon, glucagon, hydrocodone-homatropine, HYDROmorphone, hydrOXYzine HCL,  methocarbamol, ondansetron, oxygen, polyethylene glycol, vancomycin     Results for orders placed or performed during the hospital encounter of 05/07/24 (from the past 24 hour(s))   POCT GLUCOSE   Result Value Ref Range    POCT Glucose 110 (H) 74 - 99 mg/dL   POCT GLUCOSE   Result Value Ref Range    POCT Glucose 121 (H) 74 - 99 mg/dL   POCT GLUCOSE   Result Value Ref Range    POCT Glucose 113 (H) 74 - 99 mg/dL   POCT GLUCOSE   Result Value Ref Range    POCT Glucose 99 74 - 99 mg/dL   CBC and Auto Differential   Result Value Ref Range    WBC 16.1 (H) 4.4 - 11.3 x10*3/uL    nRBC 0.2 (H) 0.0 - 0.0 /100 WBCs    RBC 3.99 (L) 4.00 - 5.20 x10*6/uL    Hemoglobin 9.9 (L) 12.0 - 16.0 g/dL    Hematocrit 32.5 (L) 36.0 - 46.0 %    MCV 82 80 - 100 fL    MCH 24.8 (L) 26.0 - 34.0 pg    MCHC 30.5 (L) 32.0 - 36.0 g/dL    RDW 18.8 (H) 11.5 - 14.5 %    Platelets 395 150 - 450 x10*3/uL    Neutrophils % 89.2 40.0 - 80.0 %    Immature Granulocytes %, Automated 1.7 (H) 0.0 - 0.9 %    Lymphocytes % 4.2 13.0 - 44.0 %    Monocytes % 4.8 2.0 - 10.0 %    Eosinophils % 0.0 0.0 - 6.0 %    Basophils % 0.1 0.0 - 2.0 %    Neutrophils Absolute 14.41 (H) 1.20 - 7.70 x10*3/uL    Immature Granulocytes Absolute, Automated 0.27 0.00 - 0.70 x10*3/uL    Lymphocytes Absolute 0.67 (L) 1.20 - 4.80 x10*3/uL    Monocytes Absolute 0.77 0.10 - 1.00 x10*3/uL    Eosinophils Absolute 0.00 0.00 - 0.70 x10*3/uL    Basophils Absolute 0.02 0.00 - 0.10 x10*3/uL   Basic Metabolic Panel   Result Value Ref Range    Glucose 115 (H) 65 - 99 mg/dL    Sodium 135 133 - 145 mmol/L    Potassium 4.7 3.4 - 5.1 mmol/L    Chloride 101 97 - 107 mmol/L    Bicarbonate 22 (L) 24 - 31 mmol/L    Urea Nitrogen 28 (H) 8 - 25 mg/dL    Creatinine 0.90 0.40 - 1.60 mg/dL    eGFR 72 >60 mL/min/1.73m*2    Calcium 10.7 (H) 8.5 - 10.4 mg/dL    Anion Gap 12 <=19 mmol/L   Magnesium   Result Value Ref Range    Magnesium 1.90 1.60 - 3.10 mg/dL   Phosphorus   Result Value Ref Range    Phosphorus 2.3 (L)  2.5 - 4.5 mg/dL   POCT GLUCOSE   Result Value Ref Range    POCT Glucose 127 (H) 74 - 99 mg/dL   POCT GLUCOSE   Result Value Ref Range    POCT Glucose 150 (H) 74 - 99 mg/dL        XR chest 1 view  Result Date: 5/9/2024   FINDINGS: MEDIASTINUM/ LUNGS/ REGINE: Stable esophageal stent. Stable right-sided central venous MediPort. Progression of small right pleural effusion. Stable cardiomegaly. There are diffuse infiltrative opacities throughout the right lung. Stable collapse of the left upper lobe with hyper aeration of the left lower lobe. Persistent mild haziness at the left lung base. No blunting of the left lateral costophrenic sulcus.   BONES: No lytic or blastic destructive bone lesion.   UPPER ABDOMEN: Grossly intact.     Cardiomegaly.   Small right pleural effusion, mildly progressed.   Grossly stable infiltrative densities throughout the right lung.   Stable collapse of the left upper lobe with stable hyper aeration of the left lung. Mild stable infiltrate/atelectasis at the left lung base.   Stable esophageal stent. Stable right-sided central venous MediPort.   MACRO: None   Signed by: Russell Ruckre 5/9/2024 10:37 AM Dictation workstation:   [id number]    XR chest 1 view  Result Date: 5/8/2024  FINDINGS: There is worsening consolidation within the right upper lobe, right middle lobe. Similar consolidation in the right lower lobe. There is redemonstration of a prominent left basilar opacity representing sub pulmonic effusion. There is redemonstration of complete left upper lobe collapse and left hilar mass representing locally invasive esophageal cancer and malignant lymphadenopathy. No pneumothorax.       Please see above.    MACRO: None.   Signed by: Russell Escalante 5/8/2024 1:28 AM Dictation workstation:   [id number]    CT chest w IV contrast  Result Date: 5/7/2024  FINDINGS: LOWER NECK AND CHEST WALL:  Right chest port catheter.   MEDIASTINUM/REGINE:No lymphadenopathy. Metallic esophageal stent in place.  Trace debris within the stent lumen. Confluent soft tissue density within the posterior paratracheal and paraesophageal mediastinum may reflect confluence of adenopathy or locally advanced malignancy.   CARDIOVASCULAR:  Cardiac chamber size within normal limits. Small pericardial effusion. Right chest port catheter tip terminating at the SVC/RA junction. Aortic caliber normal. Normal caliber of main pulmonary artery. Scattered coronary atherosclerotic calcification.   LUNGS, AIRWAYS, AND PLEURA:  Severe focal narrowing of the left mainstem bronchus. There is complete occlusion/filling of the majority of the left lower lobar and left lower lobe segmental bronchi. Moderate bilateral pleural effusions. Moderate emphysema. Patchy ground-glass opacities throughout the right lung and dependent right lower lobe consolidation may reflect an infectious/inflammatory process. Somewhat nodular interlobular septal thickening at the medial right lung base may reflect lymphangitic carcinomatosis. There are spiculated pulmonary nodules within the left upper lobe measuring 2.1 cm, right upper lobe measuring 1.3 cm, and posteroinferior right lower lobe measuring 1.5 cm as well as the superior segment of the right lower lobe measuring 1.1 cm. There is complete collapse of the left upper lobe and complete filling of the left upper lobar bronchi.   MUSCULOSKELETAL: No acute osseous abnormality or suspicious osseous lesions. Mild multilevel spinal degenerative change.   UPPER ABDOMEN: Unremarkable.     1. Esophageal stent in place with soft tissue attenuation material in the posterior mediastinum surrounding the esophagus and central airways, likely reflecting known locally advanced esophageal squamous cell carcinoma with possible additional superimposed confluent adenopathy. There is complete narrowing/filling of the left upper lobe airway with collapse/consolidation of the left upper lobe. There is partial filling/narrowing of the left  lower lobe airway with extensive material filling the central bronchi of the left lower lobe. Extensive patchy ground-glass/consolidative opacities within the right lung concerning for an infectious/inflammatory process. 2. Moderate bilateral pleural effusions. 3. Spiculated bilateral pulmonary nodules, likely metastatic. 4. Small pericardial effusion. 5. Somewhat nodular interlobular septal thickening at the medial right lung base may reflect lymphangitic carcinomatosis.   Signed by: Chaitanya Lovell 5/7/2024 5:02 AM Dictation workstation:   [id number]    Vitals:    05/11/24 1300   BP: (!) 138/93   Pulse: (!) 118   Resp: 22   Temp:    SpO2: 94%       Physical Exam  Vitals and nursing note reviewed.   Constitutional:       General: She is not in acute distress.     Appearance: She is ill-appearing.   HENT:      Head: Normocephalic and atraumatic.      Mouth/Throat:      Mouth: Mucous membranes are dry.      Pharynx: Oropharynx is clear.   Eyes:      General: No scleral icterus.     Extraocular Movements: Extraocular movements intact.      Pupils: Pupils are equal, round, and reactive to light.   Neck:      Vascular: No carotid bruit.      Comments: Neck symmetrical  Cardiovascular:      Rate and Rhythm: Regular rhythm. Tachycardia present.   Pulmonary:      Comments: Shallow even respirations with noted accessory muscle use and conversational dyspnea, moist unproductive couth noted; on HFNC 30 liters/30%  Abdominal:      Comments: peg   Musculoskeletal:      Right lower leg: No edema.      Left lower leg: No edema.   Skin:     General: Skin is warm and dry.   Neurological:      General: No focal deficit present.      Mental Status: She is alert and oriented to person, place, and time.   Psychiatric:         Mood and Affect: Mood normal.         Behavior: Behavior normal.         Thought Content: Thought content normal.         Judgment: Judgment normal.      Comments: Anxious, tearful but affect is appropriate  given current situation          Assessment/Plan   Sepsis, due to unspecified organism, unspecified whether acute organ dysfunction present (Multi)   IMP:    Acute hypoxic respiratory failure - 2/2 large left pleural effusion. Bilat lung nodules noted on chest CT likely metastatic disease. Pulm consulted recommending thoracentesis.   Metastatic esophageal cancer - mets to brain and now lungs. Prognosis poor  Anxiety disorder - chronic, on Vistaril outpatient, continue -   Brain metastatic disease on Keppra  Chronic pain - generalized resume home regimen with fentanyl Duragesic patch, nhi, prn tylenol and prn methocarbamol     Palliative Care  DNRCCA/DNI  Capable  Son Rio is only child, stated POA but we do not have this documentation. Regardless he is legal surrogate if needed.  Patient and son met with Hospice a few weeks ago when brain mets were identified. She was not ready to shift goals of care and wanted to proceed with radiation therapy at that time.     Main issue with patient has been achieving adequate control of pain and anxiety which has been limiting ability to engage in GOC discussion with the patient and son. Dilaudid is effective but does not last and she is experiencing severe breakthrough pain within 4 hours. Will increase frequency to q4 prn. I think she would also benefit from a basal med to control her anxiety as this is ongoing problem and not new. She states she does also feel depressed when she thinks about her current situation. She is worried about her son and cat. 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 She wants her son to partake in GOC meeting. He is not here today. I did talk to him over the phone. He again reiterated to me that she has always struggled with anxiety and now this is exacerbated with current medical issues. I think she would benefit from an SSRI such as citalopram or escitalopram to help with mood and anxiety. This  might also help somewhat with pain. Son agrees with me on this. The patient is hesitant, I gave her some patient-friendly handouts on Celexa and we will revisit discussion tomorrow when she has had time to think about this.    Advance Directives Info: Patient would not like information  Discharge Planning: na  Palliative Care Team will continue to follow patient.      Sruthi Rodgers, APRN-CNP

## 2024-05-11 NOTE — PROGRESS NOTES
Pulmonary Progress Note 05/11/24     Patient seen in follow-up for acute respiratory failure with hypoxia, lung cancer, likely malignant bilateral pleural effusions and left upper lobe collapse    Subjective   Interval History:   Remains on Vapotherm but down to 30%.  Endorses some pain issues as well as some nausea related to some of the breathing treatments.  Not able to tolerate CPAP mask.    Objective   Vital signs in last 24 hours:  Temp:  [36.4 °C (97.5 °F)-36.8 °C (98.2 °F)] 36.5 °C (97.7 °F)  Heart Rate:  [] 105  Resp:  [4-33] 19  BP: (126-165)/() 137/98  FiO2 (%):  [30 %] 30 %    Intake/Output last 3 shifts:  I/O last 3 completed shifts:  In: 2310 (43 mL/kg) [NG/GT:1660; IV Piggyback:650]  Out: 1200 (22.3 mL/kg) [Urine:1200 (0.6 mL/kg/hr)]  Weight: 53.7 kg   Intake/Output this shift:  I/O this shift:  In: 80 [NG/GT:80]  Out: -     Physical Exam  Vitals reviewed.   Constitutional:       Appearance: She is ill-appearing. She is not toxic-appearing.      Comments: Vapotherm   Cardiovascular:      Rate and Rhythm: Normal rate and regular rhythm.   Pulmonary:      Effort: Pulmonary effort is normal.      Breath sounds: Examination of the right-lower field reveals decreased breath sounds. Examination of the left-lower field reveals decreased breath sounds. Decreased breath sounds present. No wheezing.   Musculoskeletal:      Right lower leg: No edema.      Left lower leg: No edema.   Skin:     General: Skin is warm and dry.   Neurological:      General: No focal deficit present.      Mental Status: She is alert.   Psychiatric:         Mood and Affect: Mood normal.         Behavior: Behavior normal.         Scheduled medications  acetylcysteine, 3 mL, nebulization, 4x daily  amLODIPine, 10 mg, oral, Daily  aspirin, 81 mg, oral, Once  atorvastatin, 20 mg, oral, Nightly  brimonidine, 1 drop, Right Eye, BID  enoxaparin, 40 mg, subcutaneous, Daily  pantoprazole, 40 mg, oral, Daily    Or  esomeprazole, 40 mg, nasoduodenal tube, Daily   Or  pantoprazole, 40 mg, intravenous, Daily  fentaNYL, 1 patch, transdermal, q72h  fentaNYL, 1 patch, transdermal, q72h  [Held by provider] furosemide, 40 mg, intravenous, Daily  gabapentin, 250 mg, oral, BID  gabapentin, 500 mg, oral, Nightly  insulin lispro, 0-5 Units, subcutaneous, q4h  ipratropium-albuteroL, 3 mL, nebulization, q4h while awake  levETIRAcetam, 750 mg, g-tube, BID  [Held by provider] lisinopril, 20 mg, oral, Daily  midodrine, 10 mg, oral, TID with meals  oxygen, , inhalation, Continuous - Inhalation  piperacillin-tazobactam, 3.375 g, intravenous, q6h  psyllium, 1 packet, oral, Daily  sennosides-docusate sodium, 2 tablet, g-tube, BID  thiamine, 100 mg, oral, Daily  vancomycin (Xellia) 1 g in 200 mL, 1 g, intravenous, q24h      Continuous medications       PRN medications  PRN medications: acetaminophen **OR** acetaminophen **OR** acetaminophen, artificial saliva (yerbas-lyt), dextrose, dextrose, glucagon, glucagon, hydrocodone-homatropine, HYDROmorphone, hydrOXYzine HCL, methocarbamol, ondansetron, oxygen, polyethylene glycol, vancomycin     Labs:  Lab Results   Component Value Date     05/11/2024    K 4.7 05/11/2024     05/11/2024    CO2 22 (L) 05/11/2024    BUN 28 (H) 05/11/2024    CREATININE 0.90 05/11/2024    GLUCOSE 115 (H) 05/11/2024    CALCIUM 10.7 (H) 05/11/2024     Lab Results   Component Value Date    WBC 16.1 (H) 05/11/2024    HGB 9.9 (L) 05/11/2024    HCT 32.5 (L) 05/11/2024    MCV 82 05/11/2024     05/11/2024       Imaging:  XR chest 1 view    Result Date: 5/8/2024  Interpreted By:  Russell Escalante, STUDY: XR CHEST 1 VIEW;  5/7/2024 10:18 pm   INDICATION: Signs/Symptoms:hypoxemia.   COMPARISON: CXR 05/07/2024, CT chest 05/07/2024   ACCESSION NUMBER(S): [id number]   ORDERING CLINICIAN: SERA SMITH   FINDINGS: There is worsening consolidation within the right upper lobe, right middle lobe. Similar  consolidation in the right lower lobe. There is redemonstration of a prominent left basilar opacity representing sub pulmonic effusion. There is redemonstration of complete left upper lobe collapse and left hilar mass representing locally invasive esophageal cancer and malignant lymphadenopathy. No pneumothorax.       Please see above.     MACRO: None.   Signed by: Russell Escalante 5/8/2024 1:28 AM Dictation workstation:   [id number]    CT chest w IV contrast    Result Date: 5/7/2024  Interpreted By:  Chaitanya Lovell, STUDY: CT CHEST W IV CONTRAST;  5/7/2024 4:25 am   INDICATION: Signs/Symptoms:pleural effusion.   COMPARISON: 05/13/2022   ACCESSION NUMBER(S): [id number]   ORDERING CLINICIAN: JENN NEWMAN   TECHNIQUE: Helical data acquisition of the chest was obtained without intravenous contrast. Images were reformatted in axial, coronal, and sagittal planes.   FINDINGS: LOWER NECK AND CHEST WALL:  Right chest port catheter.   MEDIASTINUM/REGINE:No lymphadenopathy. Metallic esophageal stent in place. Trace debris within the stent lumen. Confluent soft tissue density within the posterior paratracheal and paraesophageal mediastinum may reflect confluence of adenopathy or locally advanced malignancy.   CARDIOVASCULAR:  Cardiac chamber size within normal limits. Small pericardial effusion. Right chest port catheter tip terminating at the SVC/RA junction. Aortic caliber normal. Normal caliber of main pulmonary artery. Scattered coronary atherosclerotic calcification.   LUNGS, AIRWAYS, AND PLEURA:  Severe focal narrowing of the left mainstem bronchus. There is complete occlusion/filling of the majority of the left lower lobar and left lower lobe segmental bronchi. Moderate bilateral pleural effusions. Moderate emphysema. Patchy ground-glass opacities throughout the right lung and dependent right lower lobe consolidation may reflect an infectious/inflammatory process. Somewhat nodular interlobular septal  thickening at the medial right lung base may reflect lymphangitic carcinomatosis. There are spiculated pulmonary nodules within the left upper lobe measuring 2.1 cm, right upper lobe measuring 1.3 cm, and posteroinferior right lower lobe measuring 1.5 cm as well as the superior segment of the right lower lobe measuring 1.1 cm. There is complete collapse of the left upper lobe and complete filling of the left upper lobar bronchi.   MUSCULOSKELETAL: No acute osseous abnormality or suspicious osseous lesions. Mild multilevel spinal degenerative change.   UPPER ABDOMEN: Unremarkable.       1. Esophageal stent in place with soft tissue attenuation material in the posterior mediastinum surrounding the esophagus and central airways, likely reflecting known locally advanced esophageal squamous cell carcinoma with possible additional superimposed confluent adenopathy. There is complete narrowing/filling of the left upper lobe airway with collapse/consolidation of the left upper lobe. There is partial filling/narrowing of the left lower lobe airway with extensive material filling the central bronchi of the left lower lobe. Extensive patchy ground-glass/consolidative opacities within the right lung concerning for an infectious/inflammatory process. 2. Moderate bilateral pleural effusions. 3. Spiculated bilateral pulmonary nodules, likely metastatic. 4. Small pericardial effusion. 5. Somewhat nodular interlobular septal thickening at the medial right lung base may reflect lymphangitic carcinomatosis.   Signed by: Chaitanya Lovell 5/7/2024 5:02 AM Dictation workstation:   [id number]    XR chest 1 view    Result Date: 5/7/2024  Interpreted By:  Russell Escalante, STUDY: XR CHEST 1 VIEW;  5/7/2024 2:29 am   INDICATION: Signs/Symptoms:Chest Pain.   COMPARISON: 04/06/2017   ACCESSION NUMBER(S): [id number]   ORDERING CLINICIAN: JENN NEWMAN   FINDINGS: There is an esophageal stent. There is a right chest port  terminating over the cavoatrial junction.   There is a large left pleural effusion. There is new bilateral hilar enlargement and nodularity likely representing lymphadenopathy. There is left hemithorax volume loss. There is airspace disease at the left lung base. There is diffuse peribronchovascular and interstitial prominence. No pneumothorax.       Multiple cardiopulmonary abnormalities including probable pulmonary edema, large left pleural effusion, bilateral hilar and mediastinal lymphadenopathy and probable airway obstruction on the left resulting in volume loss of the left hemithorax. CT chest with contrast is recommended for further evaluation.   Esophageal stent consistent with esophageal cancer.   MACRO: None.   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 M: CTBWO_3 CT Dose-Length Product (DLP): 1208  mGy*cm CT Dose Reduction Employed: Automated exposure control (AEC) COMPARISON:  CT brain 04/13/2024.  MRI brain 04/12/2024. RESULT: Acute change:  No evidence of an acute intracranial process. Hemorrhage:  No evidence of acute intracranial hemorrhage. Mass Lesion / Mass Effect: Again noted is the low attenuating mass lesion in the left medial parietal lobe with confluent surrounding vasogenic edema, intracranial metastasis, better delineated on the prior MR brain 04/12/2024.  Otherwise, no midline shift or herniation. Chronic change:  Patchy nonspecific supratentorial white matter changes likely reflecting chronic microvascular ischemia. Parenchyma:  Mild generalized parenchymal volume  loss. Ventricles:  Commensurate with volume loss. Other:  The calvarium, skull base, imaged paranasal sinuses, mastoids, orbits and extracranial soft tissues are unremarkable.  Stereotactic frame is in place.  (topogram) images:  No additional findings.    IMPRESSION: Localization exam.  No acute intracranial process. : VEENA   Transcribe Date/Time: Apr 16 2024 11:46A Dictated by : KAREY AVELAR DO This examination was interpreted and the report reviewed and electronically signed by: KAREY AVELAR DO on Apr 16 2024 11:49AM  EST    CT BRAIN STEREOLOCAL WO IVCON    Result Date: 4/13/2024  * * *Final Report* * * DATE OF EXAM: Apr 13 2024  2:31PM   [facility] [place]   0503  -  CT BRAIN STEREOLOCAL WO IVCON  / ACCESSION #  [id number] PROCEDURE REASON: Other (document in comments)      * * * * Physician Interpretation * * * *  EXAMINATION:  CT STEREOLOCALIZATION BRAIN WITH CONTRAST HISTORY:  63 year old female with left parietal mass, preoperative stereo localization exam. TECHNIQUE: CT head high-resolution stereotactic localization without contrast. M: CTBWO_3 CT Dose-Length Product (DLP): 1336  mGy*cm CT Dose Reduction Employed: Automated exposure control (AEC) COMPARISON:  MR brain with and without contrast 04/12/2024. RESULT: The patient's known left parietal mass lesion is better demonstrated on the 04/12/2024 MRI with and without contrast.  There is surrounding vasogenic edema.  Mild local mass effect with partial effacement of the left occipital horn and atrium of the left lateral ventricle. No acute infarct or hemorrhage.  No midline shift or abnormal extra-axial fluid collection.  Scattered white matter hypoattenuation, nonspecific, however likely representing sequela of prior chronic microvascular ischemia.  Mild to moderate generalized parenchymal volume loss with commensurate prominence of the cortical sulci and ventricles.  Partial left lateral ventricular effacement, as above.  No hydrocephalus or  ventricular trapping. Paranasal sinuses are clear.  Mastoid air cells and middle ear cavities are clear bilaterally.  Bilateral orbits are within normal limits.   Overlying soft tissues demonstrate no focal abnormality.  No destructive calvarial lesion.  Patient is edentulous.    IMPRESSION: Stereotactic localization examination. Known left parietal lesion is better demonstrated on the 04/12/2024 MRI with and without contrast. No acute hemorrhage or large territorial infarct. : [facility]   Transcribe Date/Time: Apr 13 2024  2:33P Dictated by : RAJ DELGADO MD This examination was interpreted and the report reviewed and electronically signed by: ROBERT HAMPTON MD on Apr 13 2024  2:50PM  EST    FL enema single contrast water soluble    Result Date: 4/12/2024  * * *Final Report* * * DATE OF EXAM: Apr 12 2024  2:28PM   [facility]   5385  -  XR COLON SINGLE CONTRAST  / ACCESSION #  [id number] PROCEDURE REASON: Assess for fistula      * * * * Physician Interpretation * * * *  WATER SOLUBLE CONTRAST ENEMA HISTORY: Assess for rectovaginal fistula. COMPARISON: CT abdomen and pelvis 02/27/2024 TECHNIQUE: Water soluble-contrast enema was performed after insertion of a rectal tube.  Spot and overhead images were obtained. Contrast: RECTAL:  400 ml of OMNIPAQUE 300 Fluoroscopy radiation summary: Fluoroscopy time: 1:30 (min:sec). Air kerma: 80.5 mGy. RESULT: : No dilated loops of bowel.  Bilateral pelvic tubal ligation clips.  Calcified uterine fibroids. Contrast refluxes to the level of the mid sigmoid colon.  There is no communication with the vagina.  Filling defects in the opacified colon consistent with feces. Examination ended due to patient discomfort and difficulty retaining the contrast material. The study was performed by CHANCE Bryson, under the supervision of Dr. Kaiser, who was present for the critical portion of the exam.   Images associated with this study were submitted for interpretation and  reviewed by Dr. Kaiser. ===========    IMPRESSION: NO DEMONSTRATED RECTOVAGINAL FISTULA. : [facility]   Transcribe Date/Time: Apr 12 2024  2:38P Dictated by : CHANCE SAL This examination was interpreted and the report reviewed and electronically signed by: MABEL KAISER MD on Apr 12 2024  3:25PM  EST    MR brain w and wo IV contrast    Result Date: 4/12/2024  * * *Final Report* * * DATE OF EXAM: Apr 12 2024  5:39AM   [facility]   0295  -  MRI BRAIN WO/W IVCON  / ACCESSION #  [id number] PROCEDURE REASON: Metastatic disease evaluation      * * * * Physician Interpretation * * * *  EXAMINATION:  MRI BRAIN WO/W IVCON CLINICAL HISTORY: Gamma knife protocol preop localization exam. TECHNIQUE:  Limited Gamma knife protocol coronal T1 pre and post contrast, axial postcontrast T1, axial T2 sequence. Contrast:  11 mL [facility] IV COMPARISON: MRI of yesterday RESULT: Mass Lesion/ Mass Effect:    Peripherally enhancing cystic and solid right parietal mass measuring approximately 3 x 3 x 3 cm with more heterogenous solid components along its lateral margin.  Moderate surrounding vasogenic edema and mild locoregional mass effect.  No midline shift. No other abnormal enhancing brain lesions. Chronic Change:  Unchanged burden of supra and infratentorial chronic small vessel change. Parenchyma:   No significant volume loss for age. Ventricles:     Normal caliber and morphology. Vasculature:    Major intracranial arterial structures, and dural venous sinuses show typical flow void, suggesting patency by spin echo criteria. Other:  The visualized paranasal sinuses and mastoid air cells are clear.  The orbits and extracranial soft tissues are unremarkable.    IMPRESSION: Unchanged left parietal cystic and solid mass most compatible with metastasis.  No other enhancing brain lesions. : [facility]   Transcribe Date/Time: Apr 12 2024  6:13A Dictated by : ADIN DARBY MD This examination was interpreted and  the report reviewed and electronically signed by: ADIN DARBY MD on Apr 12 2024  6:30AM  EST    MR brain w and wo IV contrast    Result Date: 4/11/2024  * * *Final Report* * * DATE OF EXAM: Apr 10 2024 11:48PM   [facility]   0295  -  MRI BRAIN WO/W IVCON  / ACCESSION #  [id number] PROCEDURE REASON: Brain/CNS neoplasm, monitor      * * * * Physician Interpretation * * * *  EXAMINATION:  MRI BRAIN WO IVCON CLINICAL HISTORY: Brain CNS neoplasm. TECHNIQUE:  Routine brain MRI protocol without and with contrast including diffusion images. MQ:  MRBWOW_2 Contrast: None COMPARISON: CT brain 04/09/2024 RESULT: Truncated exam secondary to patient tolerance.  No postcontrast sequences are available. Acute Change:   There is no evidence of restricted diffusion to suggest an acute infarct.  There is some restricted diffusion around the periphery of the left parietal mass. Hemorrhage:    No evidence of prior parenchymal hemorrhage on the gradient echo images. Mass Lesion/ Mass Effect: 3 cm left parietal mass and moderate surrounding vasogenic edema. Chronic Change:  Scattered punctate foci of increased T2 and FLAIR signal are noted in the supratentorial and infratentorial white matter which is a nonspecific finding, but likely represents mild to moderate chronic microvascular ischemia. Parenchyma:   No significant volume loss for age. Ventricles:     Normal caliber and morphology. Skull Base:    Hypothalamic and pituitary region are grossly normal.   Craniocervical junction is normal. No significant marrow replacement process. Vasculature:    Major intracranial arterial structures, and dural venous sinuses show typical flow void, suggesting patency by spin echo criteria. Other:  No abnormal signal in the sinuses or mastoids.  The orbits and extracranial soft tissues are unremarkable.    IMPRESSION: Left parietal mass and surrounding vasogenic edema are unchanged from the CT of yesterday allowing for differences in modality. Truncated  exam secondary to patient tolerance with no postcontrast imaging limiting evaluation for additional metastatic disease. : VEENA   Transcribe Date/Time: Apr 11 2024 12:27A Dictated by : ADIN DARBY MD This examination was interpreted and the report reviewed and electronically signed by: ADIN DARBY MD on Apr 11 2024 12:40AM  EST    XR chest 1 view    Result Date: 4/10/2024  * * *Final Report* * * DATE OF EXAM: Apr 9 2024 11:00PM   [facility]   [id number]  -  XR CHEST 1V FRONTAL PORT  / ACCESSION #  [id number] PROCEDURE REASON: Shortness of breath      * * * * Physician Interpretation * * * *  EXAMINATION:  CHEST RADIOGRAPH (PORTABLE SINGLE VIEW AP) Exam Date/Time:  4/9/2024 11:00 PM Clinical History: Shortness of breath MQ:  XCPMC_6 Comparison:  Earlier the same day. RESULT: Lines, tubes, and devices:  Stable right-sided Port-A-Cath and esophageal stent.  Lungs and pleura:  No significant change of small left pleural effusion and associated basilar atelectasis.  Stable left parahilar opacity also related to atelectasis..  No pneumothorax.  Known metastatic lung nodules at the seen on prior chest CT.  No pneumothorax. Cardiomediastinal silhouette:  Stable cardiomediastinal silhouette. Other:  .    IMPRESSION: See result. : VEENA   Transcribe Date/Time: Apr 10 2024  7:11A Dictated by : SERENE HELM MD This examination was interpreted and the report reviewed and electronically signed by: SERENE HELM MD on Apr 10 2024  7:16AM  EST    CT angio chest w and wo IV contrast    Result Date: 4/9/2024  * * *Final Report* * * DATE OF EXAM: Apr 9 2024 10:53AM   [facility]   0540  -  CT CHEST W IVCON PE  / ACCESSION #  [id number] PROCEDURE REASON: Pulmonary embolism (PE) suspected, high prob      * * * * Physician Interpretation * * * * RESULT: EXAMINATION:  CHEST CT WITH CONTRAST (PULMONARY EMBOLISM PROTOCOL) CLINICAL HISTORY: Pulmonary embolus suspected Technique:  Spiral CT acquisition of the chest from the  thoracic inlet to the upper abdomen following IV contrast.  Axial 1 and 3 mm thick slices plus coronal and sagittal reformatted images. MQ:  CTCP_5 Contrast: mL Omnipaque 300 IV CT Radiation dose: Integrated Dose-length product (DLP) for this visit = mGy*cm CT Dose Reduction Employed: Automated exposure control(AEC) and iterative recon Comparison:  CT chest 2/27/2024 RESULT: Limitations:  Some respiratory motion. Evaluation for thromboembolic disease:      - Right heart chambers:  No thromboembolic disease.      - Main pulmonary arteries:  No thromboembolic disease.      - Lobar pulmonary arteries:  No thromboembolic disease.      - Segmental pulmonary arteries:  Limited due to motion in some areas.      - Subsegmental pulmonary arteries:  Limited due to motion in some areas      - Additional pulmonary artery findings:  The main pulmonary artery is normal in caliber. Lines, tubes, and devices:  Esophageal stent is situated in the mid/lower esophagus. Lung parenchyma and airways: Increased narrowing of the left main bronchus with occlusion of the left upper lobe bronchus and marked narrowing of the left lower lobe bronchus which is increased compared to prior. New lobar atelectasis of the left upper lobe/lingula. Numerous bilateral pulmonary metastases, the largest of which measures up to 2.2 cm are unchanged. Unable to assess the metastases in the atelectatic left upper lobe/lingula. Paramediastinal lung changes, presumably due to prior radiation therapy, similar to prior. There is again pulmonary emphysema. Pleural space:  Small left pleural effusion. Lower neck, lymph nodes, and mediastinum:  There is again esophageal stent. There is again soft tissue thickening along the left side of the mediastinum (series 8 image 97), which extends to the hilar region and causes worsened narrowing of the left main bronchus/left lower lobe bronchus and occlusion of the left upper lobe bronchus. The amount of soft tissue appears  more prominent compared to prior but is difficult to measure. Heart, pericardium, and thoracic vessels:  The heart is not enlarged.   There is a small pericardial effusion.  No thoracic aortic aneurysm.   Atheromatous calcification of the aorta. Bones and soft tissues:  Degenerative changes of the spine. Upper abdomen:  Water density lesion posterior right hepatic lobe. Localizer images: No additional findings.    IMPRESSION: Evaluation of some of the small peripheral pulmonary arteries is limited by respiratory motion. No definite CT evidence of pulmonary embolus otherwise. Increased amount of mediastinal soft tissue which encases the left-sided bronchi with occlusion of the left upper lobe bronchus and increased narrowing of the left main and left lower lobe bronchi. New complete atelectasis of the left upper lobe. The visualized bilateral pulmonary metastases are grossly unchanged. Small left pleural effusion. Small pericardial effusion. Transcribed Using Voice Recognition Transcribe Date/Time: Apr 9 2024 11:41A Dictated by: JO CORDOVA MD This examination was interpreted and the report reviewed and electronically signed by: JO CORDOVA MD on Apr 9 2024 12:07PM  EST    CT head wo IV contrast    Result Date: 4/9/2024  * * *Final Report* * * DATE OF EXAM: Apr 9 2024 10:52AM   [facility]   0504  -  CT BRAIN WO IVCON  / ACCESSION #  [id number] PROCEDURE REASON: Seizure, focal (Ped 0-18y)      * * * * Physician Interpretation * * * * RESULT: EXAMINATION: CT BRAIN WO IVCON CLINICAL HISTORY: Altered mental status, seizure TECHNIQUE:  Serial axial images without IV contrast were obtained from the vertex to the foramen magnum. MQ:  CTBWO_3 CT Radiation dose: Integrated Dose-Length Product (DLP) for this visit =   1187  mGy*cm CT Dose Reduction Employed: Automated exposure control(AEC) and iterative recon COMPARISON: Head CT 10/27/2023 RESULT: Post-operative change: None. Acute change: No evidence of an acute infarct or  other acute parenchymal process. Hemorrhage: No evidence of acute intracranial hemorrhage. ECASS hemorrhagic transformation score: Not Applicable Mass Lesion / Mass Effect: There is a new 3 cm left parietal mass with localized sulcal effacement and surrounding vasogenic edema. Chronic change: Scattered patchy foci of low attenuation are present within supratentorial white matter which is a nonspecific finding but likely represents mild microvascular ischemia. Parenchyma: There is mild generalized volume loss.  The brain parenchyma is otherwise within normal limits for age. Ventricles: Ventricular enlargement concordant with the degree of parenchymal volume loss. Paranasal sinuses and skull base: The visualized paranasal sinuses are grossly clear.   The skull base and imaged soft tissues are unremarkable. Localizer images: No additional findings.    IMPRESSION: Left parietal mass new from 10/27/2023, with surrounding vasogenic edema and localized mass effect. COMMUNICATION:  Communicated with DR ALFONZO ELIZABETH on 4/9/2024 11:12 AM  via verbal communication. Transcribed Using Voice Recognition Transcribe Date/Time: Apr 9 2024 11:05A Dictated by: NYDIA MADRIGAL MD This examination was interpreted and the report reviewed and electronically signed by: NYDIA MADRIGAL MD on Apr 9 2024 11:16AM  EST    XR chest 1 view    Result Date: 4/9/2024  * * *Final Report* * * DATE OF EXAM: Apr 9 2024  8:13AM   EUX   [id number]  -  XR CHEST 1V FRONTAL PORT  / ACCESSION #  [id number] PROCEDURE REASON: Shortness of breath      * * * * Physician Interpretation * * * * RESULT: EXAMINATION:  CHEST RADIOGRAPH (PORTABLE SINGLE VIEW AP) Exam Date/Time:  4/9/2024 8:13 AM CLINICAL HISTORY: Shortness of breath MQ:  XCPR_5 Comparison:  02/29/2024 RESULT: Lines, tubes, and devices:  Right IJ port, tip terminating at the cavoatrial junction.  The esophageal stent with no significant change in position, superior margin at the level of the clavicles.  Lungs and pleura:  Mild left basilar opacities, likely subsegmental atelectasis.  No substantial pleural effusion or pneumothorax. Cardiomediastinal silhouette:  Stable cardiomediastinal silhouette with masslike soft tissue prominence from the level of the aortic arch to the izabela.    IMPRESSION: See result. Transcribed Using Voice Recognition Transcribe Date/Time: Apr 9 2024  8:16A Dictated by: NYDIA MADRIGAL MD This examination was interpreted and the report reviewed and electronically signed by: NYDIA MADRIGAL MD on Apr 9 2024  8:20AM  EST              Assessment/Plan   Principal Problem:    Sepsis, due to unspecified organism, unspecified whether acute organ dysfunction present (Multi)  Active Problems:    Acute respiratory failure with hypoxia (Multi)    Pleural effusion, bilateral    Esophageal cancer (Multi)    Acute on chronic respiratory failure with hypoxia: Likely in the setting of left upper lobe collapse, advanced metastatic esophageal carcinoma,  Anticipate if remains 90% and above on 30% Vapotherm, can trial to low-flow oxygen in the next 24 hours  Left upper lobe collapse/postobstructive pneumonia  Continue with empiric antibiotics pending further cultures  Bronchodilators  Bilateral pleural effusions: Again I do not believe contributing significantly with regard to her oxygen data of breathing.  Even to slightly negative fluid balance  Poor prognosis.           LOS: 4 days       Aldair Tamayo MD  Pulmonary/Critical Care medicine

## 2024-05-11 NOTE — NURSING NOTE
Assumed care of patient, bedside shift report was received. Assessment completed, will cont to monitor and assess.

## 2024-05-11 NOTE — PROGRESS NOTES
HPI  Patient is more interactive today.  She appears less short of breath.  She still has paroxysms of cough.  She remains on high flow oxygen.    Vital signs in last 24 hours:  Temp:  [36.4 °C (97.5 °F)-36.7 °C (98.1 °F)] 36.5 °C (97.7 °F)  Heart Rate:  [] 116  Resp:  [16-33] 23  BP: (124-165)/() 124/87  FiO2 (%):  [30 %] 30 %    Intake/Output last 3 shifts:  I/O last 3 completed shifts:  In: 2310 (43 mL/kg) [NG/GT:1660; IV Piggyback:650]  Out: 1200 (22.3 mL/kg) [Urine:1200 (0.6 mL/kg/hr)]  Weight: 53.7 kg   Intake/Output this shift:  I/O this shift:  In: 430 [NG/GT:380; IV Piggyback:50]  Out: -     Physical Exam  Constitutional:       Appearance: She is ill-appearing.   HENT:      Head: Normocephalic and atraumatic.      Mouth/Throat:      Mouth: Mucous membranes are dry.   Eyes:      Extraocular Movements: Extraocular movements intact.      Pupils: Pupils are equal, round, and reactive to light.   Cardiovascular:      Rate and Rhythm: Normal rate and regular rhythm.      Pulses: Normal pulses.      Heart sounds: Normal heart sounds.   Pulmonary:      Effort: Pulmonary effort is normal.   Abdominal:      General: Bowel sounds are normal.   Musculoskeletal:         General: Normal range of motion.      Cervical back: Normal range of motion and neck supple.   Skin:     General: Skin is warm and dry.   Neurological:      General: No focal deficit present.      Mental Status: She is oriented to person, place, and time.   Psychiatric:         Mood and Affect: Mood normal.         Current Facility-Administered Medications   Medication Dose Route Frequency Provider Last Rate Last Admin    acetaminophen (Tylenol) tablet 650 mg  650 mg oral q4h PRN Bill E Hipps, PA-C   650 mg at 05/11/24 0442    Or    acetaminophen (Tylenol) oral liquid 650 mg  650 mg oral q4h PRN Bill E Hipps, PA-C   650 mg at 05/10/24 0804    Or    acetaminophen (Tylenol) suppository 650 mg  650 mg rectal q4h PRN Bill E Hipps, PA-C         acetylcysteine (Mucomyst) 200 mg/mL (20 %) nebulizer solution 600 mg  3 mL nebulization BID Samuel Tamayo MD        amLODIPine (Norvasc) tablet 10 mg  10 mg oral Daily Russell Deal MD   10 mg at 05/11/24 1010    artificial saliva (yerbas-lyt) aerosol,spray 5 mL  5 mL mucous membrane q1h PRN Alicia Whitehead, RUTH-CNP   5 mL at 05/10/24 0804    aspirin chewable tablet 81 mg  81 mg oral Once Bill PINK Hipps, PA-C        atorvastatin (Lipitor) tablet 20 mg  20 mg oral Nightly Bill E Hipps, PA-C   20 mg at 05/10/24 2141    brimonidine (AlphaGAN) 0.2 % ophthalmic solution 1 drop  1 drop Right Eye BID Bill E Hipps, PA-C   1 drop at 05/11/24 1015    dextrose 50 % injection 12.5 g  12.5 g intravenous q15 min PRN Bill E Cristo, PA-C        dextrose 50 % injection 25 g  25 g intravenous q15 min PRN Bill E Hipps, PA-C        enoxaparin (Lovenox) syringe 40 mg  40 mg subcutaneous Daily Bill Lemons PA-C   40 mg at 05/11/24 1011    pantoprazole (ProtoNix) EC tablet 40 mg  40 mg oral Daily Andrew Agosto PA-C        Or    esomeprazole (NexIUM) suspension 40 mg  40 mg nasoduodenal tube Daily Andrew Agosto PA-C        Or    pantoprazole (ProtoNix) injection 40 mg  40 mg intravenous Daily Andrew Agosto PA-C   40 mg at 05/11/24 1015    fentaNYL (Duragesic) 12 mcg/hr patch 1 patch  1 patch transdermal q72h Bill E Hipps, PA-C   1 patch at 05/10/24 1402    fentaNYL (Duragesic) 50 mcg/hr patch 1 patch  1 patch transdermal q72h Bill Lemons, PA-C   1 patch at 05/10/24 1402    [Held by provider] furosemide (Lasix) injection 40 mg  40 mg intravenous Daily Bill E Cristo, PA-C   40 mg at 05/07/24 0952    gabapentin (Neurontin) solution 250 mg  250 mg oral BID Andrew Agosto PA-C   250 mg at 05/11/24 1417    gabapentin (Neurontin) solution 500 mg  500 mg oral Nightly Andrew Agosto PA-C   500 mg at 05/10/24 2142    glucagon (Glucagen) injection 1 mg  1 mg intramuscular q15 min PRN Bill Lemons PA-C        glucagon (Glucagen)  injection 1 mg  1 mg intramuscular q15 min PRN Bill Gallos, PA-C        hydrocodone-homatropine (Hycodan) 5-1.5 mg/5 mL syrup 5 mL  5 mL g-tube q6h PRN RUTH Desai-CNP   5 mL at 05/11/24 0831    HYDROmorphone (Dilaudid) injection 1 mg  1 mg intravenous q4h PRN Sruthi Rodgers, APRN-CNP        hydrOXYzine HCL (Atarax) tablet 25 mg  25 mg j-tube q6h PRN Bill E Cleos, PA-C   25 mg at 05/11/24 0442    insulin lispro (HumaLOG) injection 0-5 Units  0-5 Units subcutaneous q4h Bill Lemons, PA-C   1 Units at 05/10/24 1229    ipratropium-albuteroL (Duo-Neb) 0.5-2.5 mg/3 mL nebulizer solution 3 mL  3 mL nebulization q4h while awake Bill Lemons, PA-C   3 mL at 05/11/24 1437    levETIRAcetam (Keppra) 100 mg/mL solution 750 mg  750 mg g-tube BID Bill Lemons, PA-C   750 mg at 05/11/24 1011    [Held by provider] lisinopril tablet 20 mg  20 mg oral Daily Bill Lemons, PA-C   20 mg at 05/07/24 0952    methocarbamol (Robaxin) tablet 500 mg  500 mg g-tube q8h PRN Bill E Cleos, PA-C   500 mg at 05/11/24 1059    ondansetron (Zofran) tablet 8 mg  8 mg g-tube q8h PRN Bill E Cristo, PA-C        oxygen (O2) therapy   inhalation Continuous - Inhalation Eric Alberto MD   30 L/min at 05/11/24 0828    oxygen (O2) therapy   inhalation Continuous PRN - O2/gases Andrew Agosto PA-C        piperacillin-tazobactam-dextrose (Zosyn) IV 3.375 g  3.375 g intravenous q6h Bill Lemons, PA-C   Stopped at 05/11/24 1043    polyethylene glycol (Glycolax, Miralax) packet 17 g  17 g oral Daily PRN RUTH Desai-CNP        psyllium (Metamucil) 3.4 gram packet 1 packet  1 packet oral Daily YOSVANY Desai   1 packet at 05/11/24 1012    sennosides-docusate sodium (Shivani-Colace) 8.6-50 mg per tablet 2 tablet  2 tablet g-tube BID YOSVANY Desai   2 tablet at 05/11/24 1012    thiamine (Vitamin B-1) tablet 100 mg  100 mg oral Daily Bill Lemons PA-C   100 mg at 05/11/24 1015    vancomycin (Vancocin) pharmacy to  dose - pharmacy monitoring   miscellaneous Daily PRN Bill Lemons PA-C        vancomycin (Xellia) 1 g in 200 mL (Xellia) IVPB 1 g  1 g intravenous q24h Bill Lemons PA-C   Stopped at 05/11/24 0040       Labs    Lab Results   Component Value Date    GLUCOSE 115 (H) 05/11/2024    CALCIUM 10.7 (H) 05/11/2024     05/11/2024    K 4.7 05/11/2024    CO2 22 (L) 05/11/2024     05/11/2024    BUN 28 (H) 05/11/2024    CREATININE 0.90 05/11/2024     Lab Results   Component Value Date    WBC 16.1 (H) 05/11/2024    HGB 9.9 (L) 05/11/2024    HCT 32.5 (L) 05/11/2024    MCV 82 05/11/2024     05/11/2024     Lab Results   Component Value Date    INR 1.0 12/29/2021    PROTIME 11.0 12/29/2021       Principal Problem:    Sepsis, due to unspecified organism, unspecified whether acute organ dysfunction present (Multi)  Active Problems:    Acute respiratory failure with hypoxia (Multi)    Pleural effusion, bilateral    Esophageal cancer (Multi)      Assessment and Plan  Acute hypoxic resp failure -continue to wean oxygen as tolerated.  Metastatic esophageal cancer with malignant effusions-supportive measures, palliative care following.  Prognosis is guarded.  Anxiety -medications as ordered.  Chronic pain -pain medications adjusted.     LOS: 4 days     Russell Deal MD  05/11/24  3:28 PM

## 2024-05-12 LAB
ANION GAP SERPL CALC-SCNC: 10 MMOL/L
B-LACTAMASE ORGANISM ISLT: POSITIVE
BACTERIA SPEC CULT: ABNORMAL
BASOPHILS # BLD AUTO: 0.04 X10*3/UL (ref 0–0.1)
BASOPHILS NFR BLD AUTO: 0.3 %
BUN SERPL-MCNC: 33 MG/DL (ref 8–25)
CALCIUM SERPL-MCNC: 10.5 MG/DL (ref 8.5–10.4)
CHLORIDE SERPL-SCNC: 100 MMOL/L (ref 97–107)
CO2 SERPL-SCNC: 23 MMOL/L (ref 24–31)
CREAT SERPL-MCNC: 1 MG/DL (ref 0.4–1.6)
EGFRCR SERPLBLD CKD-EPI 2021: 63 ML/MIN/1.73M*2
EOSINOPHIL # BLD AUTO: 0.08 X10*3/UL (ref 0–0.7)
EOSINOPHIL NFR BLD AUTO: 0.6 %
ERYTHROCYTE [DISTWIDTH] IN BLOOD BY AUTOMATED COUNT: 19 % (ref 11.5–14.5)
GLUCOSE BLD MANUAL STRIP-MCNC: 100 MG/DL (ref 74–99)
GLUCOSE BLD MANUAL STRIP-MCNC: 104 MG/DL (ref 74–99)
GLUCOSE BLD MANUAL STRIP-MCNC: 111 MG/DL (ref 74–99)
GLUCOSE BLD MANUAL STRIP-MCNC: 112 MG/DL (ref 74–99)
GLUCOSE BLD MANUAL STRIP-MCNC: 93 MG/DL (ref 74–99)
GLUCOSE BLD MANUAL STRIP-MCNC: 93 MG/DL (ref 74–99)
GLUCOSE SERPL-MCNC: 93 MG/DL (ref 65–99)
GRAM STN SPEC: ABNORMAL
GRAM STN SPEC: ABNORMAL
HCT VFR BLD AUTO: 31.9 % (ref 36–46)
HGB BLD-MCNC: 9.7 G/DL (ref 12–16)
IMM GRANULOCYTES # BLD AUTO: 0.54 X10*3/UL (ref 0–0.7)
IMM GRANULOCYTES NFR BLD AUTO: 3.8 % (ref 0–0.9)
LYMPHOCYTES # BLD AUTO: 1.5 X10*3/UL (ref 1.2–4.8)
LYMPHOCYTES NFR BLD AUTO: 10.5 %
MAGNESIUM SERPL-MCNC: 1.9 MG/DL (ref 1.6–3.1)
MCH RBC QN AUTO: 25.1 PG (ref 26–34)
MCHC RBC AUTO-ENTMCNC: 30.4 G/DL (ref 32–36)
MCV RBC AUTO: 82 FL (ref 80–100)
MONOCYTES # BLD AUTO: 1.46 X10*3/UL (ref 0.1–1)
MONOCYTES NFR BLD AUTO: 10.3 %
NEUTROPHILS # BLD AUTO: 10.61 X10*3/UL (ref 1.2–7.7)
NEUTROPHILS NFR BLD AUTO: 74.5 %
NRBC BLD-RTO: 0.5 /100 WBCS (ref 0–0)
PHOSPHATE SERPL-MCNC: 3.2 MG/DL (ref 2.5–4.5)
PLATELET # BLD AUTO: 375 X10*3/UL (ref 150–450)
POTASSIUM SERPL-SCNC: 4.3 MMOL/L (ref 3.4–5.1)
RBC # BLD AUTO: 3.87 X10*6/UL (ref 4–5.2)
SODIUM SERPL-SCNC: 133 MMOL/L (ref 133–145)
WBC # BLD AUTO: 14.2 X10*3/UL (ref 4.4–11.3)

## 2024-05-12 PROCEDURE — 2500000001 HC RX 250 WO HCPCS SELF ADMINISTERED DRUGS (ALT 637 FOR MEDICARE OP)

## 2024-05-12 PROCEDURE — 83735 ASSAY OF MAGNESIUM: CPT

## 2024-05-12 PROCEDURE — 85025 COMPLETE CBC W/AUTO DIFF WBC: CPT

## 2024-05-12 PROCEDURE — 9420000001 HC RT PATIENT EDUCATION 5 MIN

## 2024-05-12 PROCEDURE — 2500000001 HC RX 250 WO HCPCS SELF ADMINISTERED DRUGS (ALT 637 FOR MEDICARE OP): Performed by: NURSE PRACTITIONER

## 2024-05-12 PROCEDURE — 82374 ASSAY BLOOD CARBON DIOXIDE: CPT

## 2024-05-12 PROCEDURE — 82947 ASSAY GLUCOSE BLOOD QUANT: CPT

## 2024-05-12 PROCEDURE — 99233 SBSQ HOSP IP/OBS HIGH 50: CPT | Performed by: NURSE PRACTITIONER

## 2024-05-12 PROCEDURE — 2500000001 HC RX 250 WO HCPCS SELF ADMINISTERED DRUGS (ALT 637 FOR MEDICARE OP): Performed by: INTERNAL MEDICINE

## 2024-05-12 PROCEDURE — 94640 AIRWAY INHALATION TREATMENT: CPT

## 2024-05-12 PROCEDURE — 94660 CPAP INITIATION&MGMT: CPT

## 2024-05-12 PROCEDURE — 2500000004 HC RX 250 GENERAL PHARMACY W/ HCPCS (ALT 636 FOR OP/ED)

## 2024-05-12 PROCEDURE — 84100 ASSAY OF PHOSPHORUS: CPT

## 2024-05-12 PROCEDURE — 2500000004 HC RX 250 GENERAL PHARMACY W/ HCPCS (ALT 636 FOR OP/ED): Mod: JZ

## 2024-05-12 PROCEDURE — 2500000002 HC RX 250 W HCPCS SELF ADMINISTERED DRUGS (ALT 637 FOR MEDICARE OP, ALT 636 FOR OP/ED)

## 2024-05-12 PROCEDURE — 94664 DEMO&/EVAL PT USE INHALER: CPT

## 2024-05-12 PROCEDURE — 2500000005 HC RX 250 GENERAL PHARMACY W/O HCPCS: Performed by: INTERNAL MEDICINE

## 2024-05-12 PROCEDURE — 2500000004 HC RX 250 GENERAL PHARMACY W/ HCPCS (ALT 636 FOR OP/ED): Performed by: INTERNAL MEDICINE

## 2024-05-12 PROCEDURE — 2060000001 HC INTERMEDIATE ICU ROOM DAILY

## 2024-05-12 RX ORDER — AMOXICILLIN AND CLAVULANATE POTASSIUM 875; 125 MG/1; MG/1
1 TABLET, FILM COATED ORAL EVERY 12 HOURS SCHEDULED
Status: DISCONTINUED | OUTPATIENT
Start: 2024-05-12 | End: 2024-05-15 | Stop reason: HOSPADM

## 2024-05-12 RX ORDER — SIMETHICONE 80 MG
80 TABLET,CHEWABLE ORAL ONCE
Status: COMPLETED | OUTPATIENT
Start: 2024-05-12 | End: 2024-05-12

## 2024-05-12 RX ORDER — SIMETHICONE 80 MG
80 TABLET,CHEWABLE ORAL 4 TIMES DAILY PRN
Status: DISCONTINUED | OUTPATIENT
Start: 2024-05-12 | End: 2024-05-15 | Stop reason: HOSPADM

## 2024-05-12 RX ORDER — ESCITALOPRAM OXALATE 10 MG/1
5 TABLET ORAL DAILY
Status: DISCONTINUED | OUTPATIENT
Start: 2024-05-12 | End: 2024-05-15 | Stop reason: HOSPADM

## 2024-05-12 RX ORDER — ESCITALOPRAM OXALATE 10 MG/1
10 TABLET ORAL DAILY
Status: DISCONTINUED | OUTPATIENT
Start: 2024-05-19 | End: 2024-05-15 | Stop reason: HOSPADM

## 2024-05-12 RX ADMIN — PIPERACILLIN SODIUM AND TAZOBACTAM SODIUM 3.38 G: 3; .375 INJECTION, SOLUTION INTRAVENOUS at 10:29

## 2024-05-12 RX ADMIN — ESCITALOPRAM OXALATE 5 MG: 10 TABLET ORAL at 12:20

## 2024-05-12 RX ADMIN — Medication 5 ML: at 21:00

## 2024-05-12 RX ADMIN — HYDROXYZINE HYDROCHLORIDE 25 MG: 25 TABLET, FILM COATED ORAL at 12:21

## 2024-05-12 RX ADMIN — ATORVASTATIN CALCIUM 20 MG: 20 TABLET, FILM COATED ORAL at 21:00

## 2024-05-12 RX ADMIN — AMOXICILLIN AND CLAVULANATE POTASSIUM 1 TABLET: 875; 125 TABLET, FILM COATED ORAL at 21:00

## 2024-05-12 RX ADMIN — Medication 4 L/MIN: at 08:00

## 2024-05-12 RX ADMIN — Medication 100 MG: at 10:15

## 2024-05-12 RX ADMIN — FUROSEMIDE 40 MG: 10 INJECTION, SOLUTION INTRAMUSCULAR; INTRAVENOUS at 10:11

## 2024-05-12 RX ADMIN — IPRATROPIUM BROMIDE AND ALBUTEROL SULFATE 3 ML: 2.5; .5 SOLUTION RESPIRATORY (INHALATION) at 07:29

## 2024-05-12 RX ADMIN — PIPERACILLIN SODIUM AND TAZOBACTAM SODIUM 3.38 G: 3; .375 INJECTION, SOLUTION INTRAVENOUS at 16:10

## 2024-05-12 RX ADMIN — PIPERACILLIN SODIUM AND TAZOBACTAM SODIUM 3.38 G: 3; .375 INJECTION, SOLUTION INTRAVENOUS at 03:50

## 2024-05-12 RX ADMIN — LEVETIRACETAM 750 MG: 100 SOLUTION ORAL at 10:15

## 2024-05-12 RX ADMIN — DOCUSATE SODIUM 50 MG AND SENNOSIDES 8.6 MG 2 TABLET: 8.6; 5 TABLET, FILM COATED ORAL at 10:15

## 2024-05-12 RX ADMIN — HYDROMORPHONE HYDROCHLORIDE 1 MG: 1 INJECTION, SOLUTION INTRAMUSCULAR; INTRAVENOUS; SUBCUTANEOUS at 20:58

## 2024-05-12 RX ADMIN — ENOXAPARIN SODIUM 40 MG: 40 INJECTION SUBCUTANEOUS at 10:14

## 2024-05-12 RX ADMIN — ACETYLCYSTEINE 600 MG: 200 INHALANT RESPIRATORY (INHALATION) at 19:22

## 2024-05-12 RX ADMIN — IPRATROPIUM BROMIDE AND ALBUTEROL SULFATE 3 ML: 2.5; .5 SOLUTION RESPIRATORY (INHALATION) at 11:56

## 2024-05-12 RX ADMIN — HYDROXYZINE HYDROCHLORIDE 25 MG: 25 TABLET, FILM COATED ORAL at 21:01

## 2024-05-12 RX ADMIN — HYDROXYZINE HYDROCHLORIDE 25 MG: 25 TABLET, FILM COATED ORAL at 04:33

## 2024-05-12 RX ADMIN — Medication 4 L/MIN: at 20:00

## 2024-05-12 RX ADMIN — BRIMONIDINE TARTRATE 1 DROP: 2 SOLUTION/ DROPS OPHTHALMIC at 21:00

## 2024-05-12 RX ADMIN — IPRATROPIUM BROMIDE AND ALBUTEROL SULFATE 3 ML: 2.5; .5 SOLUTION RESPIRATORY (INHALATION) at 19:22

## 2024-05-12 RX ADMIN — HYDROMORPHONE HYDROCHLORIDE 1 MG: 1 INJECTION, SOLUTION INTRAMUSCULAR; INTRAVENOUS; SUBCUTANEOUS at 10:11

## 2024-05-12 RX ADMIN — GABAPENTIN 250 MG: 250 SOLUTION ORAL at 17:15

## 2024-05-12 RX ADMIN — METHOCARBAMOL 500 MG: 500 TABLET ORAL at 21:00

## 2024-05-12 RX ADMIN — VANCOMYCIN 1 G: 1 INJECTION, SOLUTION INTRAVENOUS at 00:38

## 2024-05-12 RX ADMIN — IPRATROPIUM BROMIDE AND ALBUTEROL SULFATE 3 ML: 2.5; .5 SOLUTION RESPIRATORY (INHALATION) at 16:31

## 2024-05-12 RX ADMIN — GABAPENTIN 500 MG: 250 SOLUTION ORAL at 21:00

## 2024-05-12 RX ADMIN — HYDROMORPHONE HYDROCHLORIDE 1 MG: 1 INJECTION, SOLUTION INTRAMUSCULAR; INTRAVENOUS; SUBCUTANEOUS at 04:33

## 2024-05-12 RX ADMIN — LEVETIRACETAM 750 MG: 100 SOLUTION ORAL at 20:59

## 2024-05-12 RX ADMIN — HYDROMORPHONE HYDROCHLORIDE 1 MG: 1 INJECTION, SOLUTION INTRAMUSCULAR; INTRAVENOUS; SUBCUTANEOUS at 16:10

## 2024-05-12 RX ADMIN — AMLODIPINE BESYLATE 10 MG: 10 TABLET ORAL at 10:15

## 2024-05-12 RX ADMIN — SIMETHICONE 80 MG: 80 TABLET, CHEWABLE ORAL at 12:20

## 2024-05-12 RX ADMIN — PSYLLIUM HUSK 1 PACKET: 3.4 POWDER ORAL at 10:15

## 2024-05-12 RX ADMIN — BRIMONIDINE TARTRATE 1 DROP: 2 SOLUTION/ DROPS OPHTHALMIC at 10:16

## 2024-05-12 RX ADMIN — GABAPENTIN 250 MG: 250 SOLUTION ORAL at 11:09

## 2024-05-12 RX ADMIN — PANTOPRAZOLE SODIUM 40 MG: 40 TABLET, DELAYED RELEASE ORAL at 10:15

## 2024-05-12 ASSESSMENT — COGNITIVE AND FUNCTIONAL STATUS - GENERAL
CLIMB 3 TO 5 STEPS WITH RAILING: TOTAL
HELP NEEDED FOR BATHING: TOTAL
CLIMB 3 TO 5 STEPS WITH RAILING: TOTAL
DRESSING REGULAR UPPER BODY CLOTHING: TOTAL
MOBILITY SCORE: 12
TOILETING: TOTAL
MOBILITY SCORE: 12
MOVING TO AND FROM BED TO CHAIR: A LOT
STANDING UP FROM CHAIR USING ARMS: A LOT
DRESSING REGULAR LOWER BODY CLOTHING: TOTAL
PERSONAL GROOMING: TOTAL
STANDING UP FROM CHAIR USING ARMS: A LOT
TURNING FROM BACK TO SIDE WHILE IN FLAT BAD: A LITTLE
MOVING FROM LYING ON BACK TO SITTING ON SIDE OF FLAT BED WITH BEDRAILS: A LITTLE
DRESSING REGULAR UPPER BODY CLOTHING: TOTAL
HELP NEEDED FOR BATHING: TOTAL
MOVING TO AND FROM BED TO CHAIR: A LOT
TURNING FROM BACK TO SIDE WHILE IN FLAT BAD: A LITTLE
PERSONAL GROOMING: TOTAL
TOILETING: TOTAL
DRESSING REGULAR LOWER BODY CLOTHING: TOTAL
MOVING FROM LYING ON BACK TO SITTING ON SIDE OF FLAT BED WITH BEDRAILS: A LITTLE
WALKING IN HOSPITAL ROOM: TOTAL
WALKING IN HOSPITAL ROOM: TOTAL
DAILY ACTIVITIY SCORE: 9
DAILY ACTIVITIY SCORE: 9

## 2024-05-12 ASSESSMENT — PAIN - FUNCTIONAL ASSESSMENT
PAIN_FUNCTIONAL_ASSESSMENT: 0-10

## 2024-05-12 ASSESSMENT — PAIN DESCRIPTION - DESCRIPTORS
DESCRIPTORS: ACHING;BURNING
DESCRIPTORS: BURNING
DESCRIPTORS: ACHING;TENDER

## 2024-05-12 ASSESSMENT — PAIN SCALES - GENERAL
PAINLEVEL_OUTOF10: 8
PAINLEVEL_OUTOF10: 0 - NO PAIN

## 2024-05-12 ASSESSMENT — PAIN DESCRIPTION - ORIENTATION: ORIENTATION: MID;UPPER

## 2024-05-12 ASSESSMENT — PAIN DESCRIPTION - LOCATION: LOCATION: BACK

## 2024-05-12 NOTE — PROGRESS NOTES
Debbi Segura is a 63 y.o. female on day 5 of admission presenting with Sepsis, due to unspecified organism, unspecified whether acute organ dysfunction present (Multi).    Subjective   Interval History:       Afebrile, no chills  On low-flow oxygen  No cough or chest pain  No nausea vomiting  Review of Systems    Objective   Range of Vitals (last 24 hours)  Heart Rate:  []   Temp:  [36.1 °C (97 °F)-37 °C (98.6 °F)]   Resp:  [16-26]   BP: ()/()   Weight:  [53.7 kg (118 lb 6.2 oz)-57.9 kg (127 lb 10.3 oz)]   SpO2:  [92 %-99 %]   Daily Weight  05/12/24 : 57.9 kg (127 lb 10.3 oz)    Body mass index is 20.61 kg/m².    Physical Exam  Constitutional:       General: She is awake.      Appearance: She is awake, alert  HENT:      Head: Normocephalic and atraumatic.      Right Ear: External ear normal.      Left Ear: External ear normal.      Nose: Nose normal.   Eyes:      General: No scleral icterus.     Extraocular Movements: Extraocular movements intact.   Cardiovascular:      Rate and Rhythm: Tachycardia present.      Heart sounds: Normal heart sounds, S1 normal and S2 normal.   Pulmonary:      Breath sounds: Decreased breath sounds present.   Abdominal:      General: Bowel sounds are normal.      Palpations: Abdomen is soft.   Musculoskeletal:      Cervical back: Normal range of motion and neck supple.      Right lower leg: No edema.      Left lower leg: No edema.   Skin:     General: Skin is warm and dry.   Neurological:      Mental Status: She is alert.   Psychiatric:         Behavior: Behavior normal. Behavior is cooperative.     Antibiotics  aspirin chewable tablet 324 mg  famotidine PF (Pepcid) injection 20 mg  oxygen (O2) therapy  sodium chloride 0.9 % bolus 1,698 mL  vancomycin 1.5 g in 300 mL (Xellia) IVPB 1.5 g  furosemide (Lasix) injection 40 mg  iohexol (OMNIPaque) 350 mg iodine/mL solution 75 mL  furosemide (Lasix) injection 40 mg  aspirin chewable tablet 81 mg  acetaminophen (Tylenol)  tablet 650 mg  acetaminophen (Tylenol) oral liquid 650 mg  acetaminophen (Tylenol) suppository 650 mg  enoxaparin (Lovenox) syringe 40 mg  polyethylene glycol (Glycolax, Miralax) packet 17 g  amLODIPine (Norvasc) tablet 10 mg  atorvastatin (Lipitor) tablet 20 mg  brimonidine (AlphaGAN) 0.2 % ophthalmic solution 1 drop  lisinopril tablet 20 mg  thiamine (Vitamin B-1) tablet 100 mg  piperacillin-tazobactam-dextrose (Zosyn) IV 3.375 g      methocarbamol (Robaxin) tablet  ondansetron (Zofran) tablet    pantoprazole (ProtoNix) EC tablet  hydrOXYzine (Atarax) tablet  levETIRAcetam (Keppra) 100 mg/mL solution 750 mg  hydrOXYzine HCL (Atarax) tablet 25 mg  methocarbamol (Robaxin) tablet 500 mg  ondansetron (Zofran) tablet 8 mg  sennosides (Senokot) tablet 17.2 mg  pantoprazole (ProtoNix) EC tablet 40 mg  ipratropium-albuteroL (Duo-Neb) 0.5-2.5 mg/3 mL nebulizer solution 3 mL  acetylcysteine (Mucomyst) 200 mg/mL (20 %) nebulizer solution 600 mg  fentaNYL (Duragesic) 50 mcg/hr patch 1 patch  fentaNYL (Duragesic) 12 mcg/hr patch 1 patch  gabapentin (Neurontin) solution 250 mg  gabapentin (Neurontin) solution 500 mg  methocarbamol (Robaxin) tablet 500 mg  vancomycin (Vancocin) pharmacy to dose - pharmacy monitoring  vancomycin (Xellia) 1 g in 200 mL (Xellia) IVPB 1 g  enoxaparin (Lovenox) syringe 40 mg  oxygen (O2) therapy  albumin human 5 % infusion 12.5 g  glucagon (Glucagen) injection 1 mg  dextrose 50 % injection 25 g  glucagon (Glucagen) injection 1 mg  dextrose 50 % injection 12.5 g  insulin lispro (HumaLOG) injection 0-5 Units  potassium chloride (Klor-Con) packet 20 mEq  pantoprazole (ProtoNix) EC tablet 40 mg  esomeprazole (NexIUM) suspension 40 mg  pantoprazole (ProtoNix) injection 40 mg  norepinephrine (Levophed) 8 mg in dextrose 5% 250 mL (0.032 mg/mL) infusion (premix)  methylPREDNISolone sod succinate (SOLU-Medrol) 40 mg/mL injection 40 mg  hydrocodone-homatropine (Hycodan) 5-1.5 mg/5 mL syrup 5 mL  artificial  "saliva (yerbas-lyt) aerosol,spray 5 mL  sennosides-docusate sodium (Shivani-Colace) 8.6-50 mg per tablet 2 tablet  lidocaine-prilocaine (Emla) cream  HYDROmorphone (Dilaudid) injection 0.2 mg  potassium phosphates 15 mmol in sodium chloride 0.9% 250 mL IV  magnesium sulfate IV 2 g  oxygen (O2) therapy  midodrine (Proamatine) tablet 10 mg  oxygen (O2) therapy  oxygen (O2) therapy  oxygen (O2) therapy  polyethylene glycol (Glycolax, Miralax) packet 17 g  psyllium (Metamucil) 3.4 gram packet 1 packet  HYDROmorphone (Dilaudid) injection 1 mg  acetylcysteine (Mucomyst) 200 mg/mL (20 %) nebulizer solution 600 mg  HYDROmorphone (Dilaudid) injection 1 mg  oxygen (O2) therapy      Relevant Results  Labs  Results from last 72 hours   Lab Units 05/12/24  0339 05/11/24  0413 05/10/24  0453   WBC AUTO x10*3/uL 14.2* 16.1* 14.6*   HEMOGLOBIN g/dL 9.7* 9.9* 9.2*   HEMATOCRIT % 31.9* 32.5* 30.2*   PLATELETS AUTO x10*3/uL 375 395 362   NEUTROS PCT AUTO % 74.5 89.2 90.1   LYMPHS PCT AUTO % 10.5 4.2 3.9   MONOS PCT AUTO % 10.3 4.8 5.1   EOS PCT AUTO % 0.6 0.0 0.0     Results from last 72 hours   Lab Units 05/12/24  0339 05/11/24  0413 05/10/24  0453   SODIUM mmol/L 133 135 136   POTASSIUM mmol/L 4.3 4.7 4.3   CHLORIDE mmol/L 100 101 102   CO2 mmol/L 23* 22* 22*   BUN mg/dL 33* 28* 24   CREATININE mg/dL 1.00 0.90 0.90   GLUCOSE mg/dL 93 115* 134*   CALCIUM mg/dL 10.5* 10.7* 10.8*   ANION GAP mmol/L 10 12 12   EGFR mL/min/1.73m*2 63 72 72   PHOSPHORUS mg/dL 3.2 2.3* 2.2*         Estimated Creatinine Clearance: 51.9 mL/min (by C-G formula based on SCr of 1 mg/dL).  No results found for: \"CRP\"  Microbiology  Susceptibility data from last 14 days.  Collected Specimen Info Organism Amoxicillin/Clavulanate Ampicillin Ampicillin/Sulbactam Cefazolin Cefazolin (uncomplicated UTIs only) Ceftriaxone Ciprofloxacin Gentamicin Nitrofurantoin Piperacillin/Tazobactam   05/08/24 Tissue/Biopsy from Wound/Tissue Staphylococcus aureus               Mixed " Gram-Positive and Gram-Negative Bacteria             05/07/24 Urine from Clean Catch/Voided Klebsiella pneumoniae/variicola (1)  S  R  S  R  S  S  R  S  R  S     Klebsiella pneumoniae/variicola (2)  S  R  S  S  S   I  S  R  S     Collected Specimen Info Organism Trimethoprim/Sulfamethoxazole   05/08/24 Tissue/Biopsy from Wound/Tissue Staphylococcus aureus      Mixed Gram-Positive and Gram-Negative Bacteria    05/07/24 Urine from Clean Catch/Voided Klebsiella pneumoniae/variicola (1)  S     Klebsiella pneumoniae/variicola (2)  S     Imaging  XR chest 1 view    Result Date: 5/9/2024  Interpreted By:  Russell Rucker, STUDY: XR CHEST 1 VIEW;  5/9/2024 8:37 am   INDICATION: Signs/Symptoms:hypoxia, eval pleural effusions.   COMPARISON: Most recent prior is from 05/07/2024. CT scan from 05/07/2024.   ACCESSION NUMBER(S): [id number]   ORDERING CLINICIAN: JESI LEON   TECHNIQUE: Single AP portable view of the chest was obtained.   FINDINGS: MEDIASTINUM/ LUNGS/ REGINE: Stable esophageal stent. Stable right-sided central venous MediPort. Progression of small right pleural effusion. Stable cardiomegaly. There are diffuse infiltrative opacities throughout the right lung. Stable collapse of the left upper lobe with hyper aeration of the left lower lobe. Persistent mild haziness at the left lung base. No blunting of the left lateral costophrenic sulcus.   BONES: No lytic or blastic destructive bone lesion.   UPPER ABDOMEN: Grossly intact.       Cardiomegaly.   Small right pleural effusion, mildly progressed.   Grossly stable infiltrative densities throughout the right lung.   Stable collapse of the left upper lobe with stable hyper aeration of the left lung. Mild stable infiltrate/atelectasis at the left lung base.   Stable esophageal stent. Stable right-sided central venous MediPort.   MACRO: None   Signed by: Russell Rucker 5/9/2024 10:37 AM Dictation workstation:   [id number]    XR chest 1 view    Result Date:  5/8/2024  Interpreted By:  Russell Escalante, STUDY: XR CHEST 1 VIEW;  5/7/2024 10:18 pm   INDICATION: Signs/Symptoms:hypoxemia.   COMPARISON: CXR 05/07/2024, CT chest 05/07/2024   ACCESSION NUMBER(S): [id number]   ORDERING CLINICIAN: SERA SMITH   FINDINGS: There is worsening consolidation within the right upper lobe, right middle lobe. Similar consolidation in the right lower lobe. There is redemonstration of a prominent left basilar opacity representing sub pulmonic effusion. There is redemonstration of complete left upper lobe collapse and left hilar mass representing locally invasive esophageal cancer and malignant lymphadenopathy. No pneumothorax.       Please see above.     MACRO: None.   Signed by: Russell Escalante 5/8/2024 1:28 AM Dictation workstation:   [id number]    CT chest w IV contrast    Result Date: 5/7/2024  Interpreted By:  Chaitanya Lovell, STUDY: CT CHEST W IV CONTRAST;  5/7/2024 4:25 am   INDICATION: Signs/Symptoms:pleural effusion.   COMPARISON: 05/13/2022   ACCESSION NUMBER(S): [id number]   ORDERING CLINICIAN: JENN NEWMAN   TECHNIQUE: Helical data acquisition of the chest was obtained without intravenous contrast. Images were reformatted in axial, coronal, and sagittal planes.   FINDINGS: LOWER NECK AND CHEST WALL:  Right chest port catheter.   MEDIASTINUM/REGINE:No lymphadenopathy. Metallic esophageal stent in place. Trace debris within the stent lumen. Confluent soft tissue density within the posterior paratracheal and paraesophageal mediastinum may reflect confluence of adenopathy or locally advanced malignancy.   CARDIOVASCULAR:  Cardiac chamber size within normal limits. Small pericardial effusion. Right chest port catheter tip terminating at the SVC/RA junction. Aortic caliber normal. Normal caliber of main pulmonary artery. Scattered coronary atherosclerotic calcification.   LUNGS, AIRWAYS, AND PLEURA:  Severe focal narrowing of the left mainstem bronchus. There  is complete occlusion/filling of the majority of the left lower lobar and left lower lobe segmental bronchi. Moderate bilateral pleural effusions. Moderate emphysema. Patchy ground-glass opacities throughout the right lung and dependent right lower lobe consolidation may reflect an infectious/inflammatory process. Somewhat nodular interlobular septal thickening at the medial right lung base may reflect lymphangitic carcinomatosis. There are spiculated pulmonary nodules within the left upper lobe measuring 2.1 cm, right upper lobe measuring 1.3 cm, and posteroinferior right lower lobe measuring 1.5 cm as well as the superior segment of the right lower lobe measuring 1.1 cm. There is complete collapse of the left upper lobe and complete filling of the left upper lobar bronchi.   MUSCULOSKELETAL: No acute osseous abnormality or suspicious osseous lesions. Mild multilevel spinal degenerative change.   UPPER ABDOMEN: Unremarkable.       1. Esophageal stent in place with soft tissue attenuation material in the posterior mediastinum surrounding the esophagus and central airways, likely reflecting known locally advanced esophageal squamous cell carcinoma with possible additional superimposed confluent adenopathy. There is complete narrowing/filling of the left upper lobe airway with collapse/consolidation of the left upper lobe. There is partial filling/narrowing of the left lower lobe airway with extensive material filling the central bronchi of the left lower lobe. Extensive patchy ground-glass/consolidative opacities within the right lung concerning for an infectious/inflammatory process. 2. Moderate bilateral pleural effusions. 3. Spiculated bilateral pulmonary nodules, likely metastatic. 4. Small pericardial effusion. 5. Somewhat nodular interlobular septal thickening at the medial right lung base may reflect lymphangitic carcinomatosis.   Signed by: Chaitanya Lovell 5/7/2024 5:02 AM Dictation workstation:    [id number]    XR chest 1 view    Result Date: 5/7/2024  Interpreted By:  Russell Escalante, STUDY: XR CHEST 1 VIEW;  5/7/2024 2:29 am   INDICATION: Signs/Symptoms:Chest Pain.   COMPARISON: 04/06/2017   ACCESSION NUMBER(S): [id number]   ORDERING CLINICIAN: JENN NEWMAN   FINDINGS: There is an esophageal stent. There is a right chest port terminating over the cavoatrial junction.   There is a large left pleural effusion. There is new bilateral hilar enlargement and nodularity likely representing lymphadenopathy. There is left hemithorax volume loss. There is airspace disease at the left lung base. There is diffuse peribronchovascular and interstitial prominence. No pneumothorax.       Multiple cardiopulmonary abnormalities including probable pulmonary edema, large left pleural effusion, bilateral hilar and mediastinal lymphadenopathy and probable airway obstruction on the left resulting in volume loss of the left hemithorax. CT chest with contrast is recommended for further evaluation.   Esophageal stent consistent with esophageal cancer.   MACRO: None.   Signed by: Russell Escalante 5/7/2024 2:37 AM Dictation workstation:   [id number]    CT BRAIN STEREOLOCAL WO IVCON    Result Date: 4/16/2024  * * *Final Report* * * DATE OF EXAM: Apr 16 2024 11:40AM   [facility]   0503  -  CT BRAIN STEREOLOCAL WO IVCON  / ACCESSION #  [id number] PROCEDURE REASON: Metastasis to brain (HCC)      * * * * Physician Interpretation * * * *  EXAMINATION:  CT BRAIN STEREOLOCAL WO IVCON HISTORY:  Metastasis to brain TECHNIQUE: CT head without contrast. M: CTBWO_3 CT Dose-Length Product (DLP): 1208  mGy*cm CT Dose Reduction Employed: Automated exposure control (AEC) COMPARISON:  CT brain 04/13/2024.  MRI brain 04/12/2024. RESULT: Acute change:  No evidence of an acute intracranial process. Hemorrhage:  No evidence of acute intracranial hemorrhage. Mass Lesion / Mass Effect: Again noted is the low attenuating mass lesion in the left  medial parietal lobe with confluent surrounding vasogenic edema, intracranial metastasis, better delineated on the prior MR brain 04/12/2024.  Otherwise, no midline shift or herniation. Chronic change:  Patchy nonspecific supratentorial white matter changes likely reflecting chronic microvascular ischemia. Parenchyma:  Mild generalized parenchymal volume loss. Ventricles:  Commensurate with volume loss. Other:  The calvarium, skull base, imaged paranasal sinuses, mastoids, orbits and extracranial soft tissues are unremarkable.  Stereotactic frame is in place.  (topogram) images:  No additional findings.    IMPRESSION: Localization exam.  No acute intracranial process. : VEENA   Transcribe Date/Time: Apr 16 2024 11:46A Dictated by : KAREY AVELAR DO This examination was interpreted and the report reviewed and electronically signed by: KAREY AVELAR DO on Apr 16 2024 11:49AM  EST    CT BRAIN STEREOLOCAL WO IVCON    Result Date: 4/13/2024  * * *Final Report* * * DATE OF EXAM: Apr 13 2024  2:31PM   [facility] [place] [facility] [place]  CT BRAIN STEREOLOCAL WO IVCON  / ACCESSION #  [id number] PROCEDURE REASON: Other (document in comments)      * * * * Physician Interpretation * * * *  EXAMINATION:  CT STEREOLOCALIZATION BRAIN WITH CONTRAST HISTORY:  63 year old female with left parietal mass, preoperative stereo localization exam. TECHNIQUE: CT head high-resolution stereotactic localization without contrast. M: CTBWO_3 CT Dose-Length Product (DLP): 1336  mGy*cm CT Dose Reduction Employed: Automated exposure control (AEC) COMPARISON:  MR brain with and without contrast 04/12/2024. RESULT: The patient's known left parietal mass lesion is better demonstrated on the 04/12/2024 MRI with and without contrast.  There is surrounding vasogenic edema.  Mild local mass effect with partial effacement of the left occipital horn and atrium of the left lateral ventricle. No acute infarct or hemorrhage.  No midline shift or abnormal  extra-axial fluid collection.  Scattered white matter hypoattenuation, nonspecific, however likely representing sequela of prior chronic microvascular ischemia.  Mild to moderate generalized parenchymal volume loss with commensurate prominence of the cortical sulci and ventricles.  Partial left lateral ventricular effacement, as above.  No hydrocephalus or ventricular trapping. Paranasal sinuses are clear.  Mastoid air cells and middle ear cavities are clear bilaterally.  Bilateral orbits are within normal limits.   Overlying soft tissues demonstrate no focal abnormality.  No destructive calvarial lesion.  Patient is edentulous.    IMPRESSION: Stereotactic localization examination. Known left parietal lesion is better demonstrated on the 04/12/2024 MRI with and without contrast. No acute hemorrhage or large territorial infarct. : PSCB   Transcribe Date/Time: Apr 13 2024  2:33P Dictated by : RAJ DELGADO MD This examination was interpreted and the report reviewed and electronically signed by: ROBERT HAMPTON MD on Apr 13 2024  2:50PM  EST     Assessment/Plan   Shock, possibly septic, off pressors  Acute hypoxic respiratory failure-differentials include lung collapse, pleural effusion, infection, on low-flow oxygen  Abnormal CT chest-pleural effusion, left-sided lung collapse, possible pneumonia  Infected PEG tube site-wound culture growing MSSA  Klebsiella urinary tract infection  Metastatic esophageal cancer     Discontinue vancomycin  Discontinue Zosyn  Augmentin-total of 14 days  Local care of PEG tube site  Oxygen as needed  Supportive care  Monitor temperature and WBC      Dylon Blackmon MD

## 2024-05-12 NOTE — NURSING NOTE
Anxious over SNF, states that [facility] is unable to take care of her, wishes to find a new facility.

## 2024-05-12 NOTE — PROGRESS NOTES
Palliative Care Progress Note    Date of Admission: 5/7/2024    Patient is a 63 y.o. female admitted with Sepsis, due to unspecified organism, unspecified whether acute organ dysfunction present (Multi). Now in SDU on 4 liters nasal canula.    Mental/Cognitive Status: anxious    Respiratory Status: on 4 liters, says breathing feels a little better, but still having some sob    Pain Assessment:generalized     Pertinent Symptoms: no nausea/vomiting. Says she is feeling like she has a lot of gas. Last BM was 5/10    Diet/Nutrition: peg    Bowel Regimen: senna and prn miralax    Patient's current condition/Anticipated Prognosis: poor.  Family/Healthcare Proxy involvement: son Rio.    Scheduled medications  acetylcysteine, 3 mL, nebulization, BID  amLODIPine, 10 mg, oral, Daily  aspirin, 81 mg, oral, Once  atorvastatin, 20 mg, oral, Nightly  brimonidine, 1 drop, Right Eye, BID  enoxaparin, 40 mg, subcutaneous, Daily  [START ON 5/19/2024] escitalopram, 10 mg, oral, Daily  escitalopram, 5 mg, oral, Daily  pantoprazole, 40 mg, oral, Daily   Or  esomeprazole, 40 mg, nasoduodenal tube, Daily   Or  pantoprazole, 40 mg, intravenous, Daily  fentaNYL, 1 patch, transdermal, q72h  fentaNYL, 1 patch, transdermal, q72h  furosemide, 40 mg, intravenous, Daily  gabapentin, 250 mg, oral, BID  gabapentin, 500 mg, oral, Nightly  insulin lispro, 0-5 Units, subcutaneous, q4h  ipratropium-albuteroL, 3 mL, nebulization, q4h while awake  levETIRAcetam, 750 mg, g-tube, BID  lisinopril, 20 mg, oral, Daily  oxygen, , inhalation, Continuous - Inhalation  piperacillin-tazobactam, 3.375 g, intravenous, q6h  psyllium, 1 packet, oral, Daily  sennosides-docusate sodium, 2 tablet, g-tube, BID  thiamine, 100 mg, oral, Daily  vancomycin (Xellia) 1 g in 200 mL, 1 g, intravenous, q24h      Continuous medications     PRN medications  PRN medications: acetaminophen **OR** acetaminophen **OR** acetaminophen, artificial saliva (yerbas-lyt), dextrose, dextrose,  glucagon, glucagon, hydrocodone-homatropine, HYDROmorphone, hydrOXYzine HCL, methocarbamol, ondansetron, oxygen, polyethylene glycol, vancomycin     Results for orders placed or performed during the hospital encounter of 05/07/24 (from the past 24 hour(s))   POCT GLUCOSE   Result Value Ref Range    POCT Glucose 105 (H) 74 - 99 mg/dL   POCT GLUCOSE   Result Value Ref Range    POCT Glucose 99 74 - 99 mg/dL   POCT GLUCOSE   Result Value Ref Range    POCT Glucose 104 (H) 74 - 99 mg/dL   CBC and Auto Differential   Result Value Ref Range    WBC 14.2 (H) 4.4 - 11.3 x10*3/uL    nRBC 0.5 (H) 0.0 - 0.0 /100 WBCs    RBC 3.87 (L) 4.00 - 5.20 x10*6/uL    Hemoglobin 9.7 (L) 12.0 - 16.0 g/dL    Hematocrit 31.9 (L) 36.0 - 46.0 %    MCV 82 80 - 100 fL    MCH 25.1 (L) 26.0 - 34.0 pg    MCHC 30.4 (L) 32.0 - 36.0 g/dL    RDW 19.0 (H) 11.5 - 14.5 %    Platelets 375 150 - 450 x10*3/uL    Neutrophils % 74.5 40.0 - 80.0 %    Immature Granulocytes %, Automated 3.8 (H) 0.0 - 0.9 %    Lymphocytes % 10.5 13.0 - 44.0 %    Monocytes % 10.3 2.0 - 10.0 %    Eosinophils % 0.6 0.0 - 6.0 %    Basophils % 0.3 0.0 - 2.0 %    Neutrophils Absolute 10.61 (H) 1.20 - 7.70 x10*3/uL    Immature Granulocytes Absolute, Automated 0.54 0.00 - 0.70 x10*3/uL    Lymphocytes Absolute 1.50 1.20 - 4.80 x10*3/uL    Monocytes Absolute 1.46 (H) 0.10 - 1.00 x10*3/uL    Eosinophils Absolute 0.08 0.00 - 0.70 x10*3/uL    Basophils Absolute 0.04 0.00 - 0.10 x10*3/uL   Basic Metabolic Panel   Result Value Ref Range    Glucose 93 65 - 99 mg/dL    Sodium 133 133 - 145 mmol/L    Potassium 4.3 3.4 - 5.1 mmol/L    Chloride 100 97 - 107 mmol/L    Bicarbonate 23 (L) 24 - 31 mmol/L    Urea Nitrogen 33 (H) 8 - 25 mg/dL    Creatinine 1.00 0.40 - 1.60 mg/dL    eGFR 63 >60 mL/min/1.73m*2    Calcium 10.5 (H) 8.5 - 10.4 mg/dL    Anion Gap 10 <=19 mmol/L   Magnesium   Result Value Ref Range    Magnesium 1.90 1.60 - 3.10 mg/dL   Phosphorus   Result Value Ref Range    Phosphorus 3.2 2.5 - 4.5  mg/dL   POCT GLUCOSE   Result Value Ref Range    POCT Glucose 93 74 - 99 mg/dL   POCT GLUCOSE   Result Value Ref Range    POCT Glucose 93 74 - 99 mg/dL        XR chest 1 view  Result Date: 5/9/2024  Interpreted By:  Russell Rucker, STUDY: XR CHEST 1 VIEW;  5/9/2024 8:37 am   INDICATION: Signs/Symptoms:hypoxia, eval pleural effusions.   COMPARISON: Most recent prior is from 05/07/2024. CT scan from 05/07/2024.   ACCESSION NUMBER(S): [id number]   ORDERING CLINICIAN: JESI LEON   TECHNIQUE: Single AP portable view of the chest was obtained.   FINDINGS: MEDIASTINUM/ LUNGS/ REGINE: Stable esophageal stent. Stable right-sided central venous MediPort. Progression of small right pleural effusion. Stable cardiomegaly. There are diffuse infiltrative opacities throughout the right lung. Stable collapse of the left upper lobe with hyper aeration of the left lower lobe. Persistent mild haziness at the left lung base. No blunting of the left lateral costophrenic sulcus.   BONES: No lytic or blastic destructive bone lesion.   UPPER ABDOMEN: Grossly intact.       Cardiomegaly.   Small right pleural effusion, mildly progressed.   Grossly stable infiltrative densities throughout the right lung.   Stable collapse of the left upper lobe with stable hyper aeration of the left lung. Mild stable infiltrate/atelectasis at the left lung base.   Stable esophageal stent. Stable right-sided central venous MediPort.   MACRO: None   Signed by: Russell Rucker 5/9/2024 10:37 AM Dictation workstation:   [id number]    XR chest 1 view  Result Date: 5/8/2024  FINDINGS: There is worsening consolidation within the right upper lobe, right middle lobe. Similar consolidation in the right lower lobe. There is redemonstration of a prominent left basilar opacity representing sub pulmonic effusion. There is redemonstration of complete left upper lobe collapse and left hilar mass representing locally invasive esophageal cancer and malignant lymphadenopathy.  No pneumothorax.       Please see above.     MACRO: None.   Signed by: Russell Escalante 5/8/2024 1:28 AM Dictation workstation:   [id number]    CT chest w IV contrast  Result Date: 5/7/2024  FINDINGS: LOWER NECK AND CHEST WALL:  Right chest port catheter.   MEDIASTINUM/REGINE:No lymphadenopathy. Metallic esophageal stent in place. Trace debris within the stent lumen. Confluent soft tissue density within the posterior paratracheal and paraesophageal mediastinum may reflect confluence of adenopathy or locally advanced malignancy.   CARDIOVASCULAR:  Cardiac chamber size within normal limits. Small pericardial effusion. Right chest port catheter tip terminating at the SVC/RA junction. Aortic caliber normal. Normal caliber of main pulmonary artery. Scattered coronary atherosclerotic calcification.   LUNGS, AIRWAYS, AND PLEURA:  Severe focal narrowing of the left mainstem bronchus. There is complete occlusion/filling of the majority of the left lower lobar and left lower lobe segmental bronchi. Moderate bilateral pleural effusions. Moderate emphysema. Patchy ground-glass opacities throughout the right lung and dependent right lower lobe consolidation may reflect an infectious/inflammatory process. Somewhat nodular interlobular septal thickening at the medial right lung base may reflect lymphangitic carcinomatosis. There are spiculated pulmonary nodules within the left upper lobe measuring 2.1 cm, right upper lobe measuring 1.3 cm, and posteroinferior right lower lobe measuring 1.5 cm as well as the superior segment of the right lower lobe measuring 1.1 cm. There is complete collapse of the left upper lobe and complete filling of the left upper lobar bronchi.   MUSCULOSKELETAL: No acute osseous abnormality or suspicious osseous lesions. Mild multilevel spinal degenerative change.   UPPER ABDOMEN: Unremarkable.       1. Esophageal stent in place with soft tissue attenuation material in the posterior mediastinum surrounding  the esophagus and central airways, likely reflecting known locally advanced esophageal squamous cell carcinoma with possible additional superimposed confluent adenopathy. There is complete narrowing/filling of the left upper lobe airway with collapse/consolidation of the left upper lobe. There is partial filling/narrowing of the left lower lobe airway with extensive material filling the central bronchi of the left lower lobe. Extensive patchy ground-glass/consolidative opacities within the right lung concerning for an infectious/inflammatory process. 2. Moderate bilateral pleural effusions. 3. Spiculated bilateral pulmonary nodules, likely metastatic. 4. Small pericardial effusion. 5. Somewhat nodular interlobular septal thickening at the medial right lung base may reflect lymphangitic carcinomatosis.   Signed by: Chaitanya Lovell 5/7/2024 5:02 AM Dictation workstation:   [id number]    XR chest 1 view  Result Date: 5/7/2024  Interpreted By:  Russell Escalante, STUDY: XR CHEST 1 VIEW;  5/7/2024 2:29 am   INDICATION: Signs/Symptoms:Chest Pain.   COMPARISON: 04/06/2017   ACCESSION NUMBER(S): [id number]   ORDERING CLINICIAN: JENN NEWMAN   FINDINGS: There is an esophageal stent. There is a right chest port terminating over the cavoatrial junction.   There is a large left pleural effusion. There is new bilateral hilar enlargement and nodularity likely representing lymphadenopathy. There is left hemithorax volume loss. There is airspace disease at the left lung base. There is diffuse peribronchovascular and interstitial prominence. No pneumothorax.       Multiple cardiopulmonary abnormalities including probable pulmonary edema, large left pleural effusion, bilateral hilar and mediastinal lymphadenopathy and probable airway obstruction on the left resulting in volume loss of the left hemithorax. CT chest with contrast is recommended for further evaluation.   Esophageal stent consistent with esophageal  cancer.   MACRO: None.   Signed by: Russell Ava 5/7/2024 2:37 AM Dictation workstation:   [id number]       Vitals:    05/12/24 0730   BP: 93/56   Pulse:    Resp: 16   Temp: 36.1 °C (97 °F)   SpO2: 97%       Physical Exam  Vitals and nursing note reviewed.   Constitutional:       General: She is not in acute distress.     Appearance: She is ill-appearing.   HENT:      Head: Normocephalic and atraumatic.      Mouth/Throat:      Mouth: Mucous membranes are dry.      Pharynx: Oropharynx is clear.   Eyes:      General: No scleral icterus.     Extraocular Movements: Extraocular movements intact.   Neck:      Comments: Symmetrical neck; cervical rotation decreased  Cardiovascular:      Rate and Rhythm: Regular rhythm. Tachycardia present.      Pulses: Normal pulses.      Heart sounds: Normal heart sounds.   Pulmonary:      Effort: No respiratory distress.      Breath sounds: No wheezing or rales.      Comments: Lungs are clear but diminished, noted conversational dyspnea, now on 4 liters NC  Abdominal:      General: Abdomen is flat. There is no distension.      Palpations: Abdomen is soft.      Tenderness: There is no abdominal tenderness. There is no guarding.   Musculoskeletal:      Right lower leg: No edema.      Left lower leg: No edema.   Skin:     General: Skin is warm and dry.      Findings: No rash.   Neurological:      General: No focal deficit present.      Mental Status: She is alert and oriented to person, place, and time.   Psychiatric:         Mood and Affect: Mood normal.         Behavior: Behavior normal.         Thought Content: Thought content normal.         Judgment: Judgment normal.         Assessment/Plan   Sepsis, due to unspecified organism, unspecified whether acute organ dysfunction present (Multi)   IMP:    Acute hypoxic respiratory failure - 2/2 large left pleural effusion. Bilat lung nodules noted on chest CT likely metastatic disease. Pulm consulted recommending thoracentesis.    Metastatic esophageal cancer - mets to brain and now lungs. Prognosis poor  Anxiety disorder - chronic, on Vistaril outpatient, add citalopram (would favor duloxetine but cannot give via peg)  Brain metastatic disease on Keppra  Chronic pain - generalized resume home regimen with fentanyl Duragesic patch, nhi, prn tylenol and prn methocarbamol      Palliative Care  DNRCCA/DNI  Capable  Son Rio is only child, stated POA but we do not have this documentation. Regardless he is legal surrogate if needed.  Patient and son met with Hospice a few weeks ago when brain mets were identified. She was not ready to shift goals of care and wanted to proceed with radiation therapy at that time.      Main issue with patient has been achieving adequate control of pain and anxiety which has been limiting ability to engage in GOC discussion with the patient and son. Dilaudid frequency increased to q4 prn yesterday. This seems to be helping. She would also benefit from a basal med to control her anxiety as this is ongoing problem and not new. Would favor duloxetine to help with mood and pain control, but cannot give DR capsule via peg. Will start on low dose of Celexa and titrate up as michelle. Will check ECG.    Advance Directives Info: Patient would not like information  Discharge Planning: will need either discharge back to LTC or hospice IPU  Palliative Care Team will continue to follow patient.    Sruthi Rodgers, RUTH-CNP

## 2024-05-12 NOTE — CARE PLAN
The patient's goals for the shift include breath better    The clinical goals for the shift include maintain pain control    Over the shift, the patient did not make progress toward the following goals. Barriers to progression include call for pain medication. Recommendations to address these barriers include call bell in reach.

## 2024-05-12 NOTE — CARE PLAN
The patient's goals for the shift include breath better    The clinical goals for the shift include maintain pain control    Over the shift, the patient did not make progress toward the following goals. Barriers to progression include . Recommendations to address these barriers include .

## 2024-05-12 NOTE — PROGRESS NOTES
HPI  Patient has been weaned down to nasal cannula 4 L.  She has persistent pain.    Vital signs in last 24 hours:  Temp:  [36.1 °C (97 °F)-37 °C (98.6 °F)] 36.1 °C (97 °F)  Heart Rate:  [] 97  Resp:  [16-23] 16  BP: ()/() 93/56  FiO2 (%):  [30 %-44 %] 36 %    Intake/Output last 3 shifts:  I/O last 3 completed shifts:  In: 4188 (72.3 mL/kg) [NG/GT:3388; IV Piggyback:800]  Out: 2000 (34.5 mL/kg) [Urine:2000 (1 mL/kg/hr)]  Weight: 57.9 kg   Intake/Output this shift:  I/O this shift:  In: 50 [IV Piggyback:50]  Out: -     Physical Exam  Constitutional:       Appearance: She is ill-appearing.   HENT:      Head: Normocephalic and atraumatic.      Mouth/Throat:      Mouth: Mucous membranes are dry.   Eyes:      Extraocular Movements: Extraocular movements intact.      Pupils: Pupils are equal, round, and reactive to light.   Cardiovascular:      Rate and Rhythm: Normal rate and regular rhythm.      Pulses: Normal pulses.      Heart sounds: Normal heart sounds.   Pulmonary:      Effort: Pulmonary effort is normal.   Abdominal:      General: Bowel sounds are normal.   Musculoskeletal:         General: Normal range of motion.      Cervical back: Normal range of motion and neck supple.   Skin:     General: Skin is warm and dry.   Neurological:      General: No focal deficit present.      Mental Status: She is oriented to person, place, and time.   Psychiatric:         Mood and Affect: Mood normal.         Current Facility-Administered Medications   Medication Dose Route Frequency Provider Last Rate Last Admin    acetaminophen (Tylenol) tablet 650 mg  650 mg oral q4h PRN Andrew Agosto PA-C   650 mg at 05/11/24 0442    Or    acetaminophen (Tylenol) oral liquid 650 mg  650 mg oral q4h PRN Andrew Agosto PA-C   650 mg at 05/11/24 2152    Or    acetaminophen (Tylenol) suppository 650 mg  650 mg rectal q4h PRN Andrew Agosto PA-C        acetylcysteine (Mucomyst) 200 mg/mL (20 %) nebulizer solution 600 mg  3  mL nebulization BID Russell Deal MD        amLODIPine (Norvasc) tablet 10 mg  10 mg oral Daily Andrew Agosto PA-C   10 mg at 05/12/24 1015    artificial saliva (yerbas-lyt) aerosol,spray 5 mL  5 mL mucous membrane q1h PRN Andrew Agosto PA-C   5 mL at 05/11/24 2241    aspirin chewable tablet 81 mg  81 mg oral Once Andrew Agosto PA-C        atorvastatin (Lipitor) tablet 20 mg  20 mg oral Nightly Andrew Agosto PA-C   20 mg at 05/11/24 2153    brimonidine (AlphaGAN) 0.2 % ophthalmic solution 1 drop  1 drop Right Eye BID Andrew Agosto PA-C   1 drop at 05/12/24 1016    dextrose 50 % injection 12.5 g  12.5 g intravenous q15 min PRN Andrew Agosto PA-C        dextrose 50 % injection 25 g  25 g intravenous q15 min PRN Andrew Agosto PA-C        enoxaparin (Lovenox) syringe 40 mg  40 mg subcutaneous Daily Andrew Agosto PA-C   40 mg at 05/12/24 1014    [START ON 5/19/2024] escitalopram (Lexapro) tablet 10 mg  10 mg oral Daily YOSVANY Le        escitalopram (Lexapro) tablet 5 mg  5 mg oral Daily YOSVANY Le        pantoprazole (ProtoNix) EC tablet 40 mg  40 mg oral Daily Andrew Agosto PA-C   40 mg at 05/12/24 1015    Or    esomeprazole (NexIUM) suspension 40 mg  40 mg nasoduodenal tube Daily Andrew Agosto PA-C        Or    pantoprazole (ProtoNix) injection 40 mg  40 mg intravenous Daily Andrew Agosto PA-C   40 mg at 05/11/24 1015    fentaNYL (Duragesic) 12 mcg/hr patch 1 patch  1 patch transdermal q72h Andrew Agosto PA-C   1 patch at 05/10/24 1402    fentaNYL (Duragesic) 50 mcg/hr patch 1 patch  1 patch transdermal q72h Andrew Agosto PA-C   1 patch at 05/10/24 1402    furosemide (Lasix) injection 40 mg  40 mg intravenous Daily Russell Deal MD   40 mg at 05/12/24 1011    gabapentin (Neurontin) solution 250 mg  250 mg oral BID Andrew Agosto PA-C   250 mg at 05/12/24 1109    gabapentin (Neurontin) solution 500 mg  500 mg oral Nightly Andrew Agosto,  JG   500 mg at 05/11/24 2153    glucagon (Glucagen) injection 1 mg  1 mg intramuscular q15 min PRN Andrew Agosto PA-C        glucagon (Glucagen) injection 1 mg  1 mg intramuscular q15 min PRN Andrew Agosto PA-C        hydrocodone-homatropine (Hycodan) 5-1.5 mg/5 mL syrup 5 mL  5 mL g-tube q6h PRN Andrew Agosto PA-C   5 mL at 05/11/24 2233    HYDROmorphone (Dilaudid) injection 1 mg  1 mg intravenous q4h PRN Russell Deal MD   1 mg at 05/12/24 1011    hydrOXYzine HCL (Atarax) tablet 25 mg  25 mg j-tube q6h PRN Andrew Agosto PA-C   25 mg at 05/12/24 0433    insulin lispro (HumaLOG) injection 0-5 Units  0-5 Units subcutaneous q4h Andrew Agosto PA-C   1 Units at 05/10/24 1229    ipratropium-albuteroL (Duo-Neb) 0.5-2.5 mg/3 mL nebulizer solution 3 mL  3 mL nebulization q4h while awake Andrew Agosto PA-C   3 mL at 05/12/24 0729    levETIRAcetam (Keppra) 100 mg/mL solution 750 mg  750 mg g-tube BID Andrew Agosto PA-C   750 mg at 05/12/24 1015    lisinopril tablet 20 mg  20 mg oral Daily Russell Deal MD   20 mg at 05/07/24 0952    methocarbamol (Robaxin) tablet 500 mg  500 mg g-tube q8h PRN Andrew Agosto PA-C   500 mg at 05/11/24 1059    ondansetron (Zofran) tablet 8 mg  8 mg g-tube q8h PRN Andrew Agosto PA-C   8 mg at 05/11/24 2233    oxygen (O2) therapy   inhalation Continuous PRN - O2/gases Andrew Agosto PA-C        oxygen (O2) therapy   inhalation Continuous - Inhalation Russell Deal MD   4 L/min at 05/12/24 0800    piperacillin-tazobactam-dextrose (Zosyn) IV 3.375 g  3.375 g intravenous q6h Andrew Agosto PA-C   Stopped at 05/12/24 1059    polyethylene glycol (Glycolax, Miralax) packet 17 g  17 g oral Daily PRN Andrew Agosto PA-C        psyllium (Metamucil) 3.4 gram packet 1 packet  1 packet oral Daily Andrew Agosto PA-C   1 packet at 05/12/24 1015    sennosides-docusate sodium (Shivani-Colace) 8.6-50 mg per tablet 2 tablet  2 tablet g-tube BID Andrew Agosto PA-C   2  tablet at 05/12/24 1015    simethicone (Mylicon) chewable tablet 80 mg  80 mg oral Once RUTH Le-LAUREN        simethicone (Mylicon) chewable tablet 80 mg  80 mg oral 4x daily PRN RUTH Le-CNP        thiamine (Vitamin B-1) tablet 100 mg  100 mg oral Daily Andrew Agosto PA-C   100 mg at 05/12/24 1015    vancomycin (Vancocin) pharmacy to dose - pharmacy monitoring   miscellaneous Daily PRN Andrew Agosto PA-C        vancomycin (Xellia) 1 g in 200 mL (Xellia) IVPB 1 g  1 g intravenous q24h Andrew Agosto PA-C   Stopped at 05/12/24 0138       Labs    Lab Results   Component Value Date    GLUCOSE 93 05/12/2024    CALCIUM 10.5 (H) 05/12/2024     05/12/2024    K 4.3 05/12/2024    CO2 23 (L) 05/12/2024     05/12/2024    BUN 33 (H) 05/12/2024    CREATININE 1.00 05/12/2024     Lab Results   Component Value Date    WBC 14.2 (H) 05/12/2024    HGB 9.7 (L) 05/12/2024    HCT 31.9 (L) 05/12/2024    MCV 82 05/12/2024     05/12/2024     Lab Results   Component Value Date    INR 1.0 12/29/2021    PROTIME 11.0 12/29/2021       Principal Problem:    Sepsis, due to unspecified organism, unspecified whether acute organ dysfunction present (Multi)  Active Problems:    Acute respiratory failure with hypoxia (Multi)    Pleural effusion, bilateral    Esophageal cancer (Multi)      Assessment and Plan  Acute hypoxic resp failure -continue to wean oxygen as tolerated.  Metastatic esophageal cancer with malignant effusions-continue with optimization of pain control.  Anxiety -will start on citalopram.  Chronic pain -pain medications adjusted.    Discharge planning.     LOS: 5 days     Russell Deal MD  05/12/24  11:46 AM

## 2024-05-12 NOTE — CONSULTS
Vancomycin Dosing by Pharmacy- Cessation of Therapy    Consult to pharmacy for vancomycin dosing has been discontinued by the prescriber, pharmacy will sign off at this time.    Please call pharmacy if there are further questions or re-enter a consult if vancomycin is resumed.     Claudia Hale, SuadD

## 2024-05-12 NOTE — CARE PLAN
Problem: Respiratory  Goal: Clear secretions with interventions this shift  Outcome: Progressing  Goal: Minimize anxiety/maximize coping throughout shift  Outcome: Progressing  Goal: Minimal/no exertional discomfort or dyspnea this shift  Outcome: Progressing  Goal: No signs of respiratory distress (eg. Use of accessory muscles. Peds grunting)  Outcome: Progressing  Goal: Patent airway maintained this shift  Outcome: Progressing  Goal: Tolerate mechanical ventilation evidenced by VS/agitation level this shift  Outcome: Progressing  Goal: Tolerate pulmonary toileting this shift  Outcome: Progressing

## 2024-05-13 ENCOUNTER — APPOINTMENT (OUTPATIENT)
Dept: RADIOLOGY | Facility: HOSPITAL | Age: 64
End: 2024-05-13
Payer: MEDICAID

## 2024-05-13 LAB
ANION GAP SERPL CALC-SCNC: 9 MMOL/L
BASOPHILS # BLD MANUAL: 0.29 X10*3/UL (ref 0–0.1)
BASOPHILS NFR BLD MANUAL: 2 %
BUN SERPL-MCNC: 37 MG/DL (ref 8–25)
CALCIUM SERPL-MCNC: 9.9 MG/DL (ref 8.5–10.4)
CHLORIDE SERPL-SCNC: 98 MMOL/L (ref 97–107)
CO2 SERPL-SCNC: 26 MMOL/L (ref 24–31)
CREAT SERPL-MCNC: 0.9 MG/DL (ref 0.4–1.6)
DACRYOCYTES BLD QL SMEAR: ABNORMAL
EGFRCR SERPLBLD CKD-EPI 2021: 72 ML/MIN/1.73M*2
EOSINOPHIL # BLD MANUAL: 0.29 X10*3/UL (ref 0–0.7)
EOSINOPHIL NFR BLD MANUAL: 2 %
ERYTHROCYTE [DISTWIDTH] IN BLOOD BY AUTOMATED COUNT: 19.8 % (ref 11.5–14.5)
GLUCOSE BLD MANUAL STRIP-MCNC: 105 MG/DL (ref 74–99)
GLUCOSE BLD MANUAL STRIP-MCNC: 112 MG/DL (ref 74–99)
GLUCOSE BLD MANUAL STRIP-MCNC: 120 MG/DL (ref 74–99)
GLUCOSE BLD MANUAL STRIP-MCNC: 83 MG/DL (ref 74–99)
GLUCOSE BLD MANUAL STRIP-MCNC: 83 MG/DL (ref 74–99)
GLUCOSE BLD MANUAL STRIP-MCNC: 99 MG/DL (ref 74–99)
GLUCOSE SERPL-MCNC: 82 MG/DL (ref 65–99)
HCT VFR BLD AUTO: 33.4 % (ref 36–46)
HGB BLD-MCNC: 10.3 G/DL (ref 12–16)
HYPOCHROMIA BLD QL SMEAR: ABNORMAL
IMM GRANULOCYTES # BLD AUTO: 0.9 X10*3/UL (ref 0–0.7)
IMM GRANULOCYTES NFR BLD AUTO: 6.3 % (ref 0–0.9)
LYMPHOCYTES # BLD MANUAL: 1.29 X10*3/UL (ref 1.2–4.8)
LYMPHOCYTES NFR BLD MANUAL: 9 %
MAGNESIUM SERPL-MCNC: 1.9 MG/DL (ref 1.6–3.1)
MCH RBC QN AUTO: 24.9 PG (ref 26–34)
MCHC RBC AUTO-ENTMCNC: 30.8 G/DL (ref 32–36)
MCV RBC AUTO: 81 FL (ref 80–100)
METAMYELOCYTES # BLD MANUAL: 0.14 X10*3/UL
METAMYELOCYTES NFR BLD MANUAL: 1 %
MONOCYTES # BLD MANUAL: 0.57 X10*3/UL (ref 0.1–1)
MONOCYTES NFR BLD MANUAL: 4 %
MYELOCYTES # BLD MANUAL: 0.29 X10*3/UL
MYELOCYTES NFR BLD MANUAL: 2 %
NEUTROPHILS # BLD MANUAL: 11.29 X10*3/UL (ref 1.2–7.7)
NEUTS BAND # BLD MANUAL: 0.14 X10*3/UL (ref 0–0.7)
NEUTS BAND NFR BLD MANUAL: 1 %
NEUTS SEG # BLD MANUAL: 11.15 X10*3/UL (ref 1.2–7)
NEUTS SEG NFR BLD MANUAL: 78 %
NRBC BLD-RTO: 0.4 /100 WBCS (ref 0–0)
OVALOCYTES BLD QL SMEAR: ABNORMAL
PHOSPHATE SERPL-MCNC: 3 MG/DL (ref 2.5–4.5)
PLATELET # BLD AUTO: 428 X10*3/UL (ref 150–450)
PLATELET CLUMP BLD QL SMEAR: PRESENT
POLYCHROMASIA BLD QL SMEAR: ABNORMAL
POTASSIUM SERPL-SCNC: 4.4 MMOL/L (ref 3.4–5.1)
RBC # BLD AUTO: 4.13 X10*6/UL (ref 4–5.2)
RBC MORPH BLD: ABNORMAL
SCHISTOCYTES BLD QL SMEAR: ABNORMAL
SODIUM SERPL-SCNC: 133 MMOL/L (ref 133–145)
TOTAL CELLS COUNTED BLD: 100
VARIANT LYMPHS # BLD MANUAL: 0.14 X10*3/UL (ref 0–0.5)
VARIANT LYMPHS NFR BLD: 1 %
WBC # BLD AUTO: 14.3 X10*3/UL (ref 4.4–11.3)

## 2024-05-13 PROCEDURE — 2500000001 HC RX 250 WO HCPCS SELF ADMINISTERED DRUGS (ALT 637 FOR MEDICARE OP)

## 2024-05-13 PROCEDURE — 2500000005 HC RX 250 GENERAL PHARMACY W/O HCPCS: Performed by: INTERNAL MEDICINE

## 2024-05-13 PROCEDURE — 2500000001 HC RX 250 WO HCPCS SELF ADMINISTERED DRUGS (ALT 637 FOR MEDICARE OP): Performed by: INTERNAL MEDICINE

## 2024-05-13 PROCEDURE — 85027 COMPLETE CBC AUTOMATED: CPT

## 2024-05-13 PROCEDURE — 71045 X-RAY EXAM CHEST 1 VIEW: CPT

## 2024-05-13 PROCEDURE — 82947 ASSAY GLUCOSE BLOOD QUANT: CPT

## 2024-05-13 PROCEDURE — 9420000001 HC RT PATIENT EDUCATION 5 MIN

## 2024-05-13 PROCEDURE — 84100 ASSAY OF PHOSPHORUS: CPT

## 2024-05-13 PROCEDURE — 82374 ASSAY BLOOD CARBON DIOXIDE: CPT

## 2024-05-13 PROCEDURE — 2500000002 HC RX 250 W HCPCS SELF ADMINISTERED DRUGS (ALT 637 FOR MEDICARE OP, ALT 636 FOR OP/ED): Performed by: INTERNAL MEDICINE

## 2024-05-13 PROCEDURE — 94640 AIRWAY INHALATION TREATMENT: CPT

## 2024-05-13 PROCEDURE — 2500000004 HC RX 250 GENERAL PHARMACY W/ HCPCS (ALT 636 FOR OP/ED): Performed by: NURSE PRACTITIONER

## 2024-05-13 PROCEDURE — 99232 SBSQ HOSP IP/OBS MODERATE 35: CPT | Performed by: NURSE PRACTITIONER

## 2024-05-13 PROCEDURE — 85007 BL SMEAR W/DIFF WBC COUNT: CPT

## 2024-05-13 PROCEDURE — 83735 ASSAY OF MAGNESIUM: CPT

## 2024-05-13 PROCEDURE — 2500000004 HC RX 250 GENERAL PHARMACY W/ HCPCS (ALT 636 FOR OP/ED)

## 2024-05-13 PROCEDURE — C9113 INJ PANTOPRAZOLE SODIUM, VIA: HCPCS

## 2024-05-13 PROCEDURE — 71045 X-RAY EXAM CHEST 1 VIEW: CPT | Performed by: RADIOLOGY

## 2024-05-13 PROCEDURE — 2060000001 HC INTERMEDIATE ICU ROOM DAILY

## 2024-05-13 PROCEDURE — 2500000002 HC RX 250 W HCPCS SELF ADMINISTERED DRUGS (ALT 637 FOR MEDICARE OP, ALT 636 FOR OP/ED)

## 2024-05-13 PROCEDURE — 2500000004 HC RX 250 GENERAL PHARMACY W/ HCPCS (ALT 636 FOR OP/ED): Performed by: INTERNAL MEDICINE

## 2024-05-13 PROCEDURE — 2500000001 HC RX 250 WO HCPCS SELF ADMINISTERED DRUGS (ALT 637 FOR MEDICARE OP): Performed by: NURSE PRACTITIONER

## 2024-05-13 RX ORDER — ONDANSETRON HYDROCHLORIDE 2 MG/ML
4 INJECTION, SOLUTION INTRAVENOUS ONCE
Status: COMPLETED | OUTPATIENT
Start: 2024-05-13 | End: 2024-05-13

## 2024-05-13 RX ORDER — IPRATROPIUM BROMIDE AND ALBUTEROL SULFATE 2.5; .5 MG/3ML; MG/3ML
3 SOLUTION RESPIRATORY (INHALATION)
Status: DISCONTINUED | OUTPATIENT
Start: 2024-05-13 | End: 2024-05-15 | Stop reason: HOSPADM

## 2024-05-13 RX ADMIN — HYDROMORPHONE HYDROCHLORIDE 1 MG: 1 INJECTION, SOLUTION INTRAMUSCULAR; INTRAVENOUS; SUBCUTANEOUS at 17:07

## 2024-05-13 RX ADMIN — IPRATROPIUM BROMIDE AND ALBUTEROL SULFATE 3 ML: 2.5; .5 SOLUTION RESPIRATORY (INHALATION) at 19:20

## 2024-05-13 RX ADMIN — GABAPENTIN 500 MG: 250 SOLUTION ORAL at 21:17

## 2024-05-13 RX ADMIN — IPRATROPIUM BROMIDE AND ALBUTEROL SULFATE 3 ML: 2.5; .5 SOLUTION RESPIRATORY (INHALATION) at 07:16

## 2024-05-13 RX ADMIN — HYDROMORPHONE HYDROCHLORIDE 1 MG: 1 INJECTION, SOLUTION INTRAMUSCULAR; INTRAVENOUS; SUBCUTANEOUS at 13:14

## 2024-05-13 RX ADMIN — HYDROMORPHONE HYDROCHLORIDE 1 MG: 1 INJECTION, SOLUTION INTRAMUSCULAR; INTRAVENOUS; SUBCUTANEOUS at 03:37

## 2024-05-13 RX ADMIN — ONDANSETRON 4 MG: 2 INJECTION INTRAMUSCULAR; INTRAVENOUS at 12:00

## 2024-05-13 RX ADMIN — ATORVASTATIN CALCIUM 20 MG: 20 TABLET, FILM COATED ORAL at 21:16

## 2024-05-13 RX ADMIN — LISINOPRIL 20 MG: 20 TABLET ORAL at 09:39

## 2024-05-13 RX ADMIN — HYDROMORPHONE HYDROCHLORIDE 1 MG: 1 INJECTION, SOLUTION INTRAMUSCULAR; INTRAVENOUS; SUBCUTANEOUS at 20:46

## 2024-05-13 RX ADMIN — PANTOPRAZOLE SODIUM 40 MG: 40 INJECTION, POWDER, FOR SOLUTION INTRAVENOUS at 09:35

## 2024-05-13 RX ADMIN — PSYLLIUM HUSK 1 PACKET: 3.4 POWDER ORAL at 09:33

## 2024-05-13 RX ADMIN — Medication 4 L/MIN: at 20:00

## 2024-05-13 RX ADMIN — BRIMONIDINE TARTRATE 1 DROP: 2 SOLUTION/ DROPS OPHTHALMIC at 21:16

## 2024-05-13 RX ADMIN — Medication 5 ML: at 09:35

## 2024-05-13 RX ADMIN — Medication 100 MG: at 09:33

## 2024-05-13 RX ADMIN — LEVETIRACETAM 750 MG: 100 SOLUTION ORAL at 21:16

## 2024-05-13 RX ADMIN — LEVETIRACETAM 750 MG: 100 SOLUTION ORAL at 09:32

## 2024-05-13 RX ADMIN — ACETYLCYSTEINE 600 MG: 200 INHALANT RESPIRATORY (INHALATION) at 07:16

## 2024-05-13 RX ADMIN — Medication 3 L/MIN: at 08:00

## 2024-05-13 RX ADMIN — HYDROXYZINE HYDROCHLORIDE 25 MG: 25 TABLET, FILM COATED ORAL at 21:16

## 2024-05-13 RX ADMIN — AMLODIPINE BESYLATE 10 MG: 10 TABLET ORAL at 09:35

## 2024-05-13 RX ADMIN — FENTANYL 1 PATCH: 50 PATCH TRANSDERMAL at 13:24

## 2024-05-13 RX ADMIN — FENTANYL 1 PATCH: 12 PATCH TRANSDERMAL at 13:33

## 2024-05-13 RX ADMIN — HYDROMORPHONE HYDROCHLORIDE 1 MG: 1 INJECTION, SOLUTION INTRAMUSCULAR; INTRAVENOUS; SUBCUTANEOUS at 08:51

## 2024-05-13 RX ADMIN — AMOXICILLIN AND CLAVULANATE POTASSIUM 1 TABLET: 875; 125 TABLET, FILM COATED ORAL at 21:16

## 2024-05-13 RX ADMIN — ENOXAPARIN SODIUM 40 MG: 40 INJECTION SUBCUTANEOUS at 09:33

## 2024-05-13 RX ADMIN — ESCITALOPRAM OXALATE 5 MG: 10 TABLET ORAL at 09:36

## 2024-05-13 RX ADMIN — AMOXICILLIN AND CLAVULANATE POTASSIUM 1 TABLET: 875; 125 TABLET, FILM COATED ORAL at 09:33

## 2024-05-13 RX ADMIN — BRIMONIDINE TARTRATE 1 DROP: 2 SOLUTION/ DROPS OPHTHALMIC at 09:35

## 2024-05-13 RX ADMIN — DOCUSATE SODIUM 50 MG AND SENNOSIDES 8.6 MG 2 TABLET: 8.6; 5 TABLET, FILM COATED ORAL at 09:33

## 2024-05-13 ASSESSMENT — PAIN DESCRIPTION - LOCATION
LOCATION: BACK
LOCATION: SHOULDER

## 2024-05-13 ASSESSMENT — COGNITIVE AND FUNCTIONAL STATUS - GENERAL
PERSONAL GROOMING: TOTAL
HELP NEEDED FOR BATHING: TOTAL
TOILETING: TOTAL
DAILY ACTIVITIY SCORE: 6
EATING MEALS: TOTAL
CLIMB 3 TO 5 STEPS WITH RAILING: TOTAL
DAILY ACTIVITIY SCORE: 6
MOVING FROM LYING ON BACK TO SITTING ON SIDE OF FLAT BED WITH BEDRAILS: A LITTLE
DRESSING REGULAR UPPER BODY CLOTHING: TOTAL
TURNING FROM BACK TO SIDE WHILE IN FLAT BAD: A LITTLE
HELP NEEDED FOR BATHING: TOTAL
WALKING IN HOSPITAL ROOM: TOTAL
PERSONAL GROOMING: TOTAL
DRESSING REGULAR UPPER BODY CLOTHING: TOTAL
MOBILITY SCORE: 12
DRESSING REGULAR LOWER BODY CLOTHING: TOTAL
MOVING TO AND FROM BED TO CHAIR: A LOT
TOILETING: TOTAL
EATING MEALS: TOTAL
STANDING UP FROM CHAIR USING ARMS: A LOT
DRESSING REGULAR LOWER BODY CLOTHING: TOTAL

## 2024-05-13 ASSESSMENT — PAIN - FUNCTIONAL ASSESSMENT
PAIN_FUNCTIONAL_ASSESSMENT: 0-10

## 2024-05-13 ASSESSMENT — PAIN SCALES - GENERAL
PAINLEVEL_OUTOF10: 9
PAINLEVEL_OUTOF10: 0 - NO PAIN
PAINLEVEL_OUTOF10: 0 - NO PAIN
PAINLEVEL_OUTOF10: 8
PAINLEVEL_OUTOF10: 0 - NO PAIN
PAINLEVEL_OUTOF10: 8
PAINLEVEL_OUTOF10: 0 - NO PAIN
PAINLEVEL_OUTOF10: 8
PAINLEVEL_OUTOF10: 9
PAINLEVEL_OUTOF10: 4

## 2024-05-13 ASSESSMENT — PAIN DESCRIPTION - ORIENTATION
ORIENTATION: OTHER (COMMENT)
ORIENTATION: RIGHT
ORIENTATION: MID

## 2024-05-13 NOTE — PROGRESS NOTES
Debbi Segura is a 63 y.o. female on day 6 of admission presenting with Sepsis, due to unspecified organism, unspecified whether acute organ dysfunction present (Multi).    Subjective   Interval History:   Afebrile, no chills  Complains of generalized body aches  No chest pain  No significant cough        Review of Systems   All other systems reviewed and are negative.      Objective   Range of Vitals (last 24 hours)  Heart Rate:  []   Temp:  [36.2 °C (97.2 °F)-36.8 °C (98.2 °F)]   Resp:  [14-16]   BP: ()/(54-73)   Weight:  [53 kg (116 lb 13.5 oz)]   SpO2:  [93 %-97 %]   Daily Weight  05/13/24 : 53 kg (116 lb 13.5 oz)    Body mass index is 18.87 kg/m².    Physical Exam  Constitutional:       General: She is awake.      Appearance: She is awake, alert  HENT:      Head: Normocephalic and atraumatic.      Right Ear: External ear normal.      Left Ear: External ear normal.      Nose: Nose normal.   Eyes:      General: No scleral icterus.     Extraocular Movements: Extraocular movements intact.   Cardiovascular:      Rate and Rhythm: Tachycardia present.      Heart sounds: Normal heart sounds, S1 normal and S2 normal.   Pulmonary:      Breath sounds: Decreased breath sounds present.   Abdominal:      General: Bowel sounds are normal.      Palpations: Abdomen is soft.   Musculoskeletal:      Cervical back: Normal range of motion and neck supple.      Right lower leg: No edema.      Left lower leg: No edema.   Skin:     General: Skin is warm and dry.   Neurological:      Mental Status: She is alert.   Psychiatric:         Behavior: Behavior normal. Behavior is cooperative.     Antibiotics  aspirin chewable tablet 324 mg  famotidine PF (Pepcid) injection 20 mg  oxygen (O2) therapy  sodium chloride 0.9 % bolus 1,698 mL  vancomycin 1.5 g in 300 mL (Xellia) IVPB 1.5 g  furosemide (Lasix) injection 40 mg  iohexol (OMNIPaque) 350 mg iodine/mL solution 75 mL  furosemide (Lasix) injection 40 mg  aspirin chewable  tablet 81 mg  acetaminophen (Tylenol) tablet 650 mg  acetaminophen (Tylenol) oral liquid 650 mg  acetaminophen (Tylenol) suppository 650 mg  enoxaparin (Lovenox) syringe 40 mg  polyethylene glycol (Glycolax, Miralax) packet 17 g  amLODIPine (Norvasc) tablet 10 mg  atorvastatin (Lipitor) tablet 20 mg  brimonidine (AlphaGAN) 0.2 % ophthalmic solution 1 drop  lisinopril tablet 20 mg  thiamine (Vitamin B-1) tablet 100 mg  piperacillin-tazobactam-dextrose (Zosyn) IV 3.375 g      methocarbamol (Robaxin) tablet  ondansetron (Zofran) tablet    pantoprazole (ProtoNix) EC tablet  hydrOXYzine (Atarax) tablet  levETIRAcetam (Keppra) 100 mg/mL solution 750 mg  hydrOXYzine HCL (Atarax) tablet 25 mg  methocarbamol (Robaxin) tablet 500 mg  ondansetron (Zofran) tablet 8 mg  sennosides (Senokot) tablet 17.2 mg  pantoprazole (ProtoNix) EC tablet 40 mg  ipratropium-albuteroL (Duo-Neb) 0.5-2.5 mg/3 mL nebulizer solution 3 mL  acetylcysteine (Mucomyst) 200 mg/mL (20 %) nebulizer solution 600 mg  fentaNYL (Duragesic) 50 mcg/hr patch 1 patch  fentaNYL (Duragesic) 12 mcg/hr patch 1 patch  gabapentin (Neurontin) solution 250 mg  gabapentin (Neurontin) solution 500 mg  methocarbamol (Robaxin) tablet 500 mg  vancomycin (Vancocin) pharmacy to dose - pharmacy monitoring  vancomycin (Xellia) 1 g in 200 mL (Xellia) IVPB 1 g  enoxaparin (Lovenox) syringe 40 mg  oxygen (O2) therapy  albumin human 5 % infusion 12.5 g  glucagon (Glucagen) injection 1 mg  dextrose 50 % injection 25 g  glucagon (Glucagen) injection 1 mg  dextrose 50 % injection 12.5 g  insulin lispro (HumaLOG) injection 0-5 Units  potassium chloride (Klor-Con) packet 20 mEq  pantoprazole (ProtoNix) EC tablet 40 mg  esomeprazole (NexIUM) suspension 40 mg  pantoprazole (ProtoNix) injection 40 mg  norepinephrine (Levophed) 8 mg in dextrose 5% 250 mL (0.032 mg/mL) infusion (premix)  methylPREDNISolone sod succinate (SOLU-Medrol) 40 mg/mL injection 40 mg  hydrocodone-homatropine (Hycodan)  5-1.5 mg/5 mL syrup 5 mL  artificial saliva (yerbas-lyt) aerosol,spray 5 mL  sennosides-docusate sodium (Shivani-Colace) 8.6-50 mg per tablet 2 tablet  lidocaine-prilocaine (Emla) cream  HYDROmorphone (Dilaudid) injection 0.2 mg  potassium phosphates 15 mmol in sodium chloride 0.9% 250 mL IV  magnesium sulfate IV 2 g  oxygen (O2) therapy  midodrine (Proamatine) tablet 10 mg  oxygen (O2) therapy  oxygen (O2) therapy  oxygen (O2) therapy  polyethylene glycol (Glycolax, Miralax) packet 17 g  psyllium (Metamucil) 3.4 gram packet 1 packet  HYDROmorphone (Dilaudid) injection 1 mg  acetylcysteine (Mucomyst) 200 mg/mL (20 %) nebulizer solution 600 mg  HYDROmorphone (Dilaudid) injection 1 mg  oxygen (O2) therapy  escitalopram (Lexapro) tablet 5 mg  escitalopram (Lexapro) tablet 10 mg  simethicone (Mylicon) chewable tablet 80 mg  simethicone (Mylicon) chewable tablet 80 mg  amoxicillin-pot clavulanate (Augmentin) 875-125 mg per tablet 1 tablet  oxygen (O2) therapy      Relevant Results  Labs  Results from last 72 hours   Lab Units 05/13/24 0418 05/12/24 0339 05/11/24 0413   WBC AUTO x10*3/uL 14.3* 14.2* 16.1*   HEMOGLOBIN g/dL 10.3* 9.7* 9.9*   HEMATOCRIT % 33.4* 31.9* 32.5*   PLATELETS AUTO x10*3/uL 428 375 395   NEUTROS PCT AUTO %  --  74.5 89.2   LYMPHO PCT MAN % 9.0  --   --    LYMPHS PCT AUTO %  --  10.5 4.2   MONO PCT MAN % 4.0  --   --    MONOS PCT AUTO %  --  10.3 4.8   EOSINO PCT MAN % 2.0  --   --    EOS PCT AUTO %  --  0.6 0.0     Results from last 72 hours   Lab Units 05/13/24 0418 05/12/24 0339 05/11/24 0413   SODIUM mmol/L 133 133 135   POTASSIUM mmol/L 4.4 4.3 4.7   CHLORIDE mmol/L 98 100 101   CO2 mmol/L 26 23* 22*   BUN mg/dL 37* 33* 28*   CREATININE mg/dL 0.90 1.00 0.90   GLUCOSE mg/dL 82 93 115*   CALCIUM mg/dL 9.9 10.5* 10.7*   ANION GAP mmol/L 9 10 12   EGFR mL/min/1.73m*2 72 63 72   PHOSPHORUS mg/dL 3.0 3.2 2.3*         Estimated Creatinine Clearance: 52.8 mL/min (by C-G formula based on SCr of 0.9  "mg/dL).  No results found for: \"CRP\"  Microbiology  Susceptibility data from last 14 days.  Collected Specimen Info Organism Amoxicillin/Clavulanate Ampicillin Ampicillin/Sulbactam Cefazolin Cefazolin (uncomplicated UTIs only) Ceftriaxone Ciprofloxacin Clindamycin Erythromycin Gentamicin Nitrofurantoin Oxacillin   05/08/24 Tissue/Biopsy from Wound/Tissue Methicillin Susceptible Staphylococcus aureus (MSSA)         S  S    S     Mixed Anaerobic Bacteria                 Mixed Gram-Positive and Gram-Negative Bacteria               05/07/24 Urine from Clean Catch/Voided Klebsiella pneumoniae/variicola (1)  S  R  S  R  S  S  R    S  R      Klebsiella pneumoniae/variicola (2)  S  R  S  S  S   I    S  R      Collected Specimen Info Organism Piperacillin/Tazobactam Tetracycline Trimethoprim/Sulfamethoxazole Vancomycin   05/08/24 Tissue/Biopsy from Wound/Tissue Methicillin Susceptible Staphylococcus aureus (MSSA)   S  S  S     Mixed Anaerobic Bacteria         Mixed Gram-Positive and Gram-Negative Bacteria       05/07/24 Urine from Clean Catch/Voided Klebsiella pneumoniae/variicola (1)  S   S      Klebsiella pneumoniae/variicola (2)  S   S      Imaging  XR chest 1 view    Result Date: 5/9/2024  Interpreted By:  Russell Rucker, STUDY: XR CHEST 1 VIEW;  5/9/2024 8:37 am   INDICATION: Signs/Symptoms:hypoxia, eval pleural effusions.   COMPARISON: Most recent prior is from 05/07/2024. CT scan from 05/07/2024.   ACCESSION NUMBER(S): [id number]   ORDERING CLINICIAN: JESI LEON   TECHNIQUE: Single AP portable view of the chest was obtained.   FINDINGS: MEDIASTINUM/ LUNGS/ REGINE: Stable esophageal stent. Stable right-sided central venous MediPort. Progression of small right pleural effusion. Stable cardiomegaly. There are diffuse infiltrative opacities throughout the right lung. Stable collapse of the left upper lobe with hyper aeration of the left lower lobe. Persistent mild haziness at the left lung base. No blunting of the left " lateral costophrenic sulcus.   BONES: No lytic or blastic destructive bone lesion.   UPPER ABDOMEN: Grossly intact.       Cardiomegaly.   Small right pleural effusion, mildly progressed.   Grossly stable infiltrative densities throughout the right lung.   Stable collapse of the left upper lobe with stable hyper aeration of the left lung. Mild stable infiltrate/atelectasis at the left lung base.   Stable esophageal stent. Stable right-sided central venous MediPort.   MACRO: None   Signed by: Russell Rucker 5/9/2024 10:37 AM Dictation workstation:   [id number]    XR chest 1 view    Result Date: 5/8/2024  Interpreted By:  Russell Escalante, STUDY: XR CHEST 1 VIEW;  5/7/2024 10:18 pm   INDICATION: Signs/Symptoms:hypoxemia.   COMPARISON: CXR 05/07/2024, CT chest 05/07/2024   ACCESSION NUMBER(S): [id number]   ORDERING CLINICIAN: SERA SMITH   FINDINGS: There is worsening consolidation within the right upper lobe, right middle lobe. Similar consolidation in the right lower lobe. There is redemonstration of a prominent left basilar opacity representing sub pulmonic effusion. There is redemonstration of complete left upper lobe collapse and left hilar mass representing locally invasive esophageal cancer and malignant lymphadenopathy. No pneumothorax.       Please see above.     MACRO: None.   Signed by: Russell Escalante 5/8/2024 1:28 AM Dictation workstation:   [id number]    CT chest w IV contrast    Result Date: 5/7/2024  Interpreted By:  Chaitanya Lovell, STUDY: CT CHEST W IV CONTRAST;  5/7/2024 4:25 am   INDICATION: Signs/Symptoms:pleural effusion.   COMPARISON: 05/13/2022   ACCESSION NUMBER(S): [id number]   ORDERING CLINICIAN: JENN NEWMAN   TECHNIQUE: Helical data acquisition of the chest was obtained without intravenous contrast. Images were reformatted in axial, coronal, and sagittal planes.   FINDINGS: LOWER NECK AND CHEST WALL:  Right chest port catheter.   MEDIASTINUM/REGINE:No  lymphadenopathy. Metallic esophageal stent in place. Trace debris within the stent lumen. Confluent soft tissue density within the posterior paratracheal and paraesophageal mediastinum may reflect confluence of adenopathy or locally advanced malignancy.   CARDIOVASCULAR:  Cardiac chamber size within normal limits. Small pericardial effusion. Right chest port catheter tip terminating at the SVC/RA junction. Aortic caliber normal. Normal caliber of main pulmonary artery. Scattered coronary atherosclerotic calcification.   LUNGS, AIRWAYS, AND PLEURA:  Severe focal narrowing of the left mainstem bronchus. There is complete occlusion/filling of the majority of the left lower lobar and left lower lobe segmental bronchi. Moderate bilateral pleural effusions. Moderate emphysema. Patchy ground-glass opacities throughout the right lung and dependent right lower lobe consolidation may reflect an infectious/inflammatory process. Somewhat nodular interlobular septal thickening at the medial right lung base may reflect lymphangitic carcinomatosis. There are spiculated pulmonary nodules within the left upper lobe measuring 2.1 cm, right upper lobe measuring 1.3 cm, and posteroinferior right lower lobe measuring 1.5 cm as well as the superior segment of the right lower lobe measuring 1.1 cm. There is complete collapse of the left upper lobe and complete filling of the left upper lobar bronchi.   MUSCULOSKELETAL: No acute osseous abnormality or suspicious osseous lesions. Mild multilevel spinal degenerative change.   UPPER ABDOMEN: Unremarkable.       1. Esophageal stent in place with soft tissue attenuation material in the posterior mediastinum surrounding the esophagus and central airways, likely reflecting known locally advanced esophageal squamous cell carcinoma with possible additional superimposed confluent adenopathy. There is complete narrowing/filling of the left upper lobe airway with collapse/consolidation of the left  upper lobe. There is partial filling/narrowing of the left lower lobe airway with extensive material filling the central bronchi of the left lower lobe. Extensive patchy ground-glass/consolidative opacities within the right lung concerning for an infectious/inflammatory process. 2. Moderate bilateral pleural effusions. 3. Spiculated bilateral pulmonary nodules, likely metastatic. 4. Small pericardial effusion. 5. Somewhat nodular interlobular septal thickening at the medial right lung base may reflect lymphangitic carcinomatosis.   Signed by: Chaitanya Lovell 5/7/2024 5:02 AM Dictation workstation:   [id number]    XR chest 1 view    Result Date: 5/7/2024  Interpreted By:  Russell Escalante, STUDY: XR CHEST 1 VIEW;  5/7/2024 2:29 am   INDICATION: Signs/Symptoms:Chest Pain.   COMPARISON: 04/06/2017   ACCESSION NUMBER(S): [id number]   ORDERING CLINICIAN: JENN NEWMAN   FINDINGS: There is an esophageal stent. There is a right chest port terminating over the cavoatrial junction.   There is a large left pleural effusion. There is new bilateral hilar enlargement and nodularity likely representing lymphadenopathy. There is left hemithorax volume loss. There is airspace disease at the left lung base. There is diffuse peribronchovascular and interstitial prominence. No pneumothorax.       Multiple cardiopulmonary abnormalities including probable pulmonary edema, large left pleural effusion, bilateral hilar and mediastinal lymphadenopathy and probable airway obstruction on the left resulting in volume loss of the left hemithorax. CT chest with contrast is recommended for further evaluation.   Esophageal stent consistent with esophageal cancer.   MACRO: None.   Signed by: Russell Escalante 5/7/2024 2:37 AM Dictation workstation:   [id number]    CT BRAIN STEREOLOCAL WO IVCON    Result Date: 4/16/2024  * * *Final Report* * * DATE OF EXAM: Apr 16 2024 11:40AM   [facility]   0503  -  CT BRAIN STEREOLOCAL WO IVCON  /  ACCESSION #  [id number] PROCEDURE REASON: Metastasis to brain (HCC)      * * * * Physician Interpretation * * * *  EXAMINATION:  CT BRAIN STEREOLOCAL WO IVCON HISTORY:  Metastasis to brain TECHNIQUE: CT head without contrast. M: CTBWO_3 CT Dose-Length Product (DLP): 1208  mGy*cm CT Dose Reduction Employed: Automated exposure control (AEC) COMPARISON:  CT brain 04/13/2024.  MRI brain 04/12/2024. RESULT: Acute change:  No evidence of an acute intracranial process. Hemorrhage:  No evidence of acute intracranial hemorrhage. Mass Lesion / Mass Effect: Again noted is the low attenuating mass lesion in the left medial parietal lobe with confluent surrounding vasogenic edema, intracranial metastasis, better delineated on the prior MR brain 04/12/2024.  Otherwise, no midline shift or herniation. Chronic change:  Patchy nonspecific supratentorial white matter changes likely reflecting chronic microvascular ischemia. Parenchyma:  Mild generalized parenchymal volume loss. Ventricles:  Commensurate with volume loss. Other:  The calvarium, skull base, imaged paranasal sinuses, mastoids, orbits and extracranial soft tissues are unremarkable.  Stereotactic frame is in place.  (topogram) images:  No additional findings.    IMPRESSION: Localization exam.  No acute intracranial process. : PSCB   Transcribe Date/Time: Apr 16 2024 11:46A Dictated by : KAREY AVELAR DO This examination was interpreted and the report reviewed and electronically signed by: KAREY AVELAR DO on Apr 16 2024 11:49AM  EST    CT BRAIN STEREOLOCAL WO IVCON    Result Date: 4/13/2024  * * *Final Report* * * DATE OF EXAM: Apr 13 2024  2:31PM   [facility]   0503  -  CT BRAIN STEREOLOCAL WO IVCON  / ACCESSION #  [id number] PROCEDURE REASON: Other (document in comments)      * * * * Physician Interpretation * * * *  EXAMINATION:  CT STEREOLOCALIZATION BRAIN WITH CONTRAST HISTORY:  63 year old female with left parietal mass, preoperative stereo localization  exam. TECHNIQUE: CT head high-resolution stereotactic localization without contrast. M: CTBWO_3 CT Dose-Length Product (DLP): 1336  mGy*cm CT Dose Reduction Employed: Automated exposure control (AEC) COMPARISON:  MR brain with and without contrast 04/12/2024. RESULT: The patient's known left parietal mass lesion is better demonstrated on the 04/12/2024 MRI with and without contrast.  There is surrounding vasogenic edema.  Mild local mass effect with partial effacement of the left occipital horn and atrium of the left lateral ventricle. No acute infarct or hemorrhage.  No midline shift or abnormal extra-axial fluid collection.  Scattered white matter hypoattenuation, nonspecific, however likely representing sequela of prior chronic microvascular ischemia.  Mild to moderate generalized parenchymal volume loss with commensurate prominence of the cortical sulci and ventricles.  Partial left lateral ventricular effacement, as above.  No hydrocephalus or ventricular trapping. Paranasal sinuses are clear.  Mastoid air cells and middle ear cavities are clear bilaterally.  Bilateral orbits are within normal limits.   Overlying soft tissues demonstrate no focal abnormality.  No destructive calvarial lesion.  Patient is edentulous.    IMPRESSION: Stereotactic localization examination. Known left parietal lesion is better demonstrated on the 04/12/2024 MRI with and without contrast. No acute hemorrhage or large territorial infarct. : PSCB   Transcribe Date/Time: Apr 13 2024  2:33P Dictated by : RAJ DELGADO MD This examination was interpreted and the report reviewed and electronically signed by: ROBERT HAMPTON MD on Apr 13 2024  2:50PM  EST     Assessment/Plan   Shock, possibly septic, off pressors  Acute hypoxic respiratory failure-differentials include lung collapse, pleural effusion, infection, on low-flow oxygen  Abnormal CT chest-pleural effusion, left-sided lung collapse, possible pneumonia  Infected  PEG tube site-wound culture growing MSSA  Klebsiella urinary tract infection  Metastatic esophageal cancer       Augmentin-total of 14 days  Local care of PEG tube site  Oxygen as needed  Supportive care  Monitor temperature and WBC      Dylon Blackmon MD

## 2024-05-13 NOTE — PROGRESS NOTES
Debbi Segura is a 63 y.o. female on day 6 of admission presenting with Sepsis, due to unspecified organism, unspecified whether acute organ dysfunction present (Multi).      Subjective   C/o ongoing nausea, not tolerating TF. Appears weak.        Objective     Last Recorded Vitals  /71 (BP Location: Left arm, Patient Position: Lying)   Pulse 94   Temp 36.8 °C (98.2 °F) (Temporal)   Resp 16   Wt 53 kg (116 lb 13.5 oz)   SpO2 94%   Intake/Output last 3 Shifts:    Intake/Output Summary (Last 24 hours) at 5/13/2024 1151  Last data filed at 5/13/2024 0400  Gross per 24 hour   Intake 2340 ml   Output 950 ml   Net 1390 ml       Admission Weight  Weight: 56.6 kg (124 lb 12.5 oz) (05/07/24 0159)    Daily Weight  05/13/24 : 53 kg (116 lb 13.5 oz)    Image Results  XR chest 1 view  Narrative: Interpreted By:  Russell Rucker,   STUDY:  XR CHEST 1 VIEW;  5/9/2024 8:37 am      INDICATION:  Signs/Symptoms:hypoxia, eval pleural effusions.      COMPARISON:  Most recent prior is from 05/07/2024. CT scan from 05/07/2024.      ACCESSION NUMBER(S):  [id number]      ORDERING CLINICIAN:  JESI LEON      TECHNIQUE:  Single AP portable view of the chest was obtained.      FINDINGS:  MEDIASTINUM/ LUNGS/ REGINE:  Stable esophageal stent. Stable right-sided central venous MediPort.  Progression of small right pleural effusion. Stable cardiomegaly.  There are diffuse infiltrative opacities throughout the right lung.  Stable collapse of the left upper lobe with hyper aeration of the  left lower lobe. Persistent mild haziness at the left lung base. No  blunting of the left lateral costophrenic sulcus.      BONES:  No lytic or blastic destructive bone lesion.      UPPER ABDOMEN:  Grossly intact.      Impression: Cardiomegaly.      Small right pleural effusion, mildly progressed.      Grossly stable infiltrative densities throughout the right lung.      Stable collapse of the left upper lobe with stable hyper aeration of  the left  lung. Mild stable infiltrate/atelectasis at the left lung  base.      Stable esophageal stent. Stable right-sided central venous MediPort.      MACRO:  None      Signed by: Russell Rucker 5/9/2024 10:37 AM  Dictation workstation:   [id number]      Expand All Collapse All    HPI  Patient has been weaned down to nasal cannula 4 L.  She has persistent pain.     Vital signs in last 24 hours:  Temp:  [36.1 °C (97 °F)-37 °C (98.6 °F)] 36.1 °C (97 °F)  Heart Rate:  [] 97  Resp:  [16-23] 16  BP: ()/() 93/56  FiO2 (%):  [30 %-44 %] 36 %     Intake/Output last 3 shifts:  I/O last 3 completed shifts:  In: 4188 (72.3 mL/kg) [NG/GT:3388; IV Piggyback:800]  Out: 2000 (34.5 mL/kg) [Urine:2000 (1 mL/kg/hr)]  Weight: 57.9 kg   Intake/Output this shift:  I/O this shift:  In: 50 [IV Piggyback:50]  Out: -      Physical Exam  Constitutional:       Appearance: She is ill-appearing.   HENT:      Head: Normocephalic and atraumatic.      Mouth/Throat:      Mouth: Mucous membranes are dry.   Eyes:      Extraocular Movements: Extraocular movements intact.      Pupils: Pupils are equal, round, and reactive to light.   Cardiovascular:      Rate and Rhythm: Tachycardia.      Pulses: Normal pulses.      Heart sounds: Normal heart sounds.   Pulmonary:      Effort: Pulmonary effort is normal.   Abdominal:      General: Bowel sounds are normal.   Musculoskeletal:         General: Normal range of motion.      Cervical back: Normal range of motion and neck supple.   Skin:     General: Skin is warm and dry.   Neurological:      General: No focal deficit present.      Mental Status: She is oriented to person, place, and time.   Psychiatric:         Mood and Affect: Mood normal.           Assessment/Plan      Acute hypoxic resp failure -continue to wean oxygen as tolerated. Pulm following, poor prognosis d/t the below. Atbs as ordered, ID following.   Metastatic esophageal cancer with malignant effusions-Pall med following. Goals of  care conversations ongoing.   Anxiety -Regimen as ordered, monitor for effectiveness.   Chronic pain -pain medications as ordered, c/o ongoing pain.   Hypotension-DC IV lasix, dc norvasc, parameters on ACE placed. Patient appears dehydrated/malnourished.      DC planning for back to NH vs hospice pending further d/w pall med.     Patient still not completely on board with hospice at this time.        Greta Jain, APRN-CNP

## 2024-05-13 NOTE — CARE PLAN
Problem: Respiratory  Goal: Minimize anxiety/maximize coping throughout shift  Outcome: Progressing  Goal: Minimal/no exertional discomfort or dyspnea this shift  Outcome: Progressing  Goal: No signs of respiratory distress (eg. Use of accessory muscles. Peds grunting)  Outcome: Progressing  Goal: Tolerate pulmonary toileting this shift  Outcome: Progressing  Goal: Verbalize decreased shortness of breath this shift  Outcome: Progressing

## 2024-05-13 NOTE — PROGRESS NOTES
Pulmonary Progress Note    Debbi Segura is a 63 y.o. female on day 6 of admission presenting with Sepsis, due to unspecified organism, unspecified whether acute organ dysfunction present (Multi).    Subjective   Groggy from the dilaudid    Objective   Vital Signs      5/12/2024    11:56 AM 5/12/2024     4:50 PM 5/12/2024     7:44 PM 5/12/2024    11:22 PM 5/13/2024     4:33 AM 5/13/2024     5:36 AM 5/13/2024     7:00 AM   Vitals   Systolic 93 85 103 99 95  108   Diastolic 65 54 71 73 66  71   Heart Rate   108 110 94     Temp 36.3 °C (97.3 °F) 36.4 °C (97.5 °F) 36.7 °C (98.1 °F) 36.6 °C (97.9 °F) 36.2 °C (97.2 °F)  36.8 °C (98.2 °F)   Resp 16 16 14 16 14  16   Weight (lb)      116.84    BMI      18.87 kg/m2    BSA (m2)      1.57 m2        Oxygen Therapy  SpO2: 94 %  Medical Gas Therapy: Supplemental oxygen  O2 Delivery Method: Nasal cannula    FiO2 (%):  [3 %-36 %] 3 %    Intake/Output previous 24 hours:    Intake/Output Summary (Last 24 hours) at 5/13/2024 1009  Last data filed at 5/13/2024 0400  Gross per 24 hour   Intake 2390 ml   Output 1850 ml   Net 540 ml       Physical Exam  Vitals reviewed.   Constitutional:       Appearance: Normal appearance.   HENT:      Head: Normocephalic and atraumatic.      Mouth/Throat:      Mouth: Mucous membranes are moist.   Eyes:      Extraocular Movements: Extraocular movements intact.      Pupils: Pupils are equal, round, and reactive to light.   Cardiovascular:      Rate and Rhythm: Normal rate and regular rhythm.   Pulmonary:      Effort: Pulmonary effort is normal.      Breath sounds: Normal breath sounds and air entry.   Abdominal:      General: Abdomen is flat. Bowel sounds are normal.      Palpations: Abdomen is soft.   Musculoskeletal:         General: Normal range of motion.      Cervical back: Normal range of motion and neck supple.   Skin:     General: Skin is warm and dry.   Neurological:      General: No focal deficit present.      Mental Status: She is alert and  oriented to person, place, and time. Mental status is at baseline.       ...  Lines and Tubes:  Peripheral IV 05/08/24 Right;Anterior Forearm (Active)   Placement Date/Time: 05/08/24 0000   Orientation: Right;Anterior  Location: Forearm  Site Prep: Alcohol   Number of days: 5       Implantable Port Right Chest Single lumen port (Active)   Earliest Known Present: 04/16/24   Placed by External Staff?: Other hospital  Orientation: Right  Implantable Port Location: Chest  Port Type: Single lumen port   Number of days: 27       Gastrostomy/Enterostomy PEG-jejunostomy LUQ (Active)   No placement date or time found.   Type: PEG-jejunostomy  Location: LUQ   Number of days:        External Urinary Catheter Female (Active)   Placement Date/Time: 05/08/24 0000   Hand Hygiene Completed: Yes  External Catheter Type: Female   Number of days: 5         Scheduled medications  acetylcysteine, 3 mL, nebulization, BID  amLODIPine, 10 mg, oral, Daily  amoxicillin-pot clavulanate, 1 tablet, oral, q12h JAIDEN  aspirin, 81 mg, oral, Once  atorvastatin, 20 mg, oral, Nightly  brimonidine, 1 drop, Right Eye, BID  enoxaparin, 40 mg, subcutaneous, Daily  [START ON 5/19/2024] escitalopram, 10 mg, oral, Daily  escitalopram, 5 mg, oral, Daily  pantoprazole, 40 mg, oral, Daily   Or  esomeprazole, 40 mg, nasoduodenal tube, Daily   Or  pantoprazole, 40 mg, intravenous, Daily  fentaNYL, 1 patch, transdermal, q72h  fentaNYL, 1 patch, transdermal, q72h  furosemide, 40 mg, intravenous, Daily  gabapentin, 250 mg, oral, BID  gabapentin, 500 mg, oral, Nightly  insulin lispro, 0-5 Units, subcutaneous, q4h  ipratropium-albuteroL, 3 mL, nebulization, q4h while awake  levETIRAcetam, 750 mg, g-tube, BID  lisinopril, 20 mg, oral, Daily  oxygen, , inhalation, Continuous - Inhalation  psyllium, 1 packet, oral, Daily  sennosides-docusate sodium, 2 tablet, g-tube, BID  thiamine, 100 mg, oral, Daily      Continuous medications     PRN medications  PRN medications:  acetaminophen **OR** acetaminophen **OR** acetaminophen, artificial saliva (yerbas-lyt), dextrose, dextrose, glucagon, glucagon, hydrocodone-homatropine, HYDROmorphone, hydrOXYzine HCL, methocarbamol, ondansetron, oxygen, polyethylene glycol, simethicone    Relevant Results  Results from last 7 days   Lab Units 05/13/24 0418 05/12/24  0339 05/11/24  0413   WBC AUTO x10*3/uL 14.3* 14.2* 16.1*   HEMOGLOBIN g/dL 10.3* 9.7* 9.9*   HEMATOCRIT % 33.4* 31.9* 32.5*   PLATELETS AUTO x10*3/uL 428 375 395      Results from last 7 days   Lab Units 05/13/24 0418 05/12/24 0339 05/11/24  0413   SODIUM mmol/L 133 133 135   POTASSIUM mmol/L 4.4 4.3 4.7   CHLORIDE mmol/L 98 100 101   CO2 mmol/L 26 23* 22*   BUN mg/dL 37* 33* 28*   CREATININE mg/dL 0.90 1.00 0.90   GLUCOSE mg/dL 82 93 115*   CALCIUM mg/dL 9.9 10.5* 10.7*      Results from last 7 days   Lab Units 05/08/24  0440   POCT PH, ARTERIAL pH 7.47*   POCT PCO2, ARTERIAL mm Hg 36*   POCT PO2, ARTERIAL mm Hg 68*   POCT HCO3 CALCULATED, ARTERIAL mmol/L 26.2*   POCT BASE EXCESS, ARTERIAL mmol/L 2.5     XR chest 1 view 05/09/2024    Narrative  Interpreted By:  Russell Rucker,  STUDY:  XR CHEST 1 VIEW;  5/9/2024 8:37 am    INDICATION:  Signs/Symptoms:hypoxia, eval pleural effusions.    COMPARISON:  Most recent prior is from 05/07/2024. CT scan from 05/07/2024.    ACCESSION NUMBER(S):  [id number]    ORDERING CLINICIAN:  JESI LEON    TECHNIQUE:  Single AP portable view of the chest was obtained.    FINDINGS:  MEDIASTINUM/ LUNGS/ REGINE:  Stable esophageal stent. Stable right-sided central venous MediPort.  Progression of small right pleural effusion. Stable cardiomegaly.  There are diffuse infiltrative opacities throughout the right lung.  Stable collapse of the left upper lobe with hyper aeration of the  left lower lobe. Persistent mild haziness at the left lung base. No  blunting of the left lateral costophrenic sulcus.    BONES:  No lytic or blastic destructive bone  lesion.    UPPER ABDOMEN:  Grossly intact.    Impression  Cardiomegaly.    Small right pleural effusion, mildly progressed.    Grossly stable infiltrative densities throughout the right lung.    Stable collapse of the left upper lobe with stable hyper aeration of  the left lung. Mild stable infiltrate/atelectasis at the left lung  base.    Stable esophageal stent. Stable right-sided central venous MediPort.    MACRO:  None    Signed by: Russell Rucker 5/9/2024 10:37 AM  Dictation workstation:   [id number]      Patient Active Problem List   Diagnosis    Abscess of labia    Accessory skin tags    Age-related nuclear cataract of right eye    Anorexia    Anxiety and depression    Blepharitis of both eyes    Blurred vision, right eye    Breast mass, right    CKD (chronic kidney disease)    Claudication (CMS-HCC)    Colitis    Combined form of age-related cataract, left eye    Combined form of age-related cataract, right eye    Diarrhea    Dry eyes, bilateral    Dyslipidemia    Eczema    Elevated IOP    Fatigue    Hair changes    Hot flashes    HTN (hypertension)    Hypercalcemia    Hypokalemia    Ischemic optic neuropathy, bilateral    Left knee pain    Low-tension glaucoma, bilateral, severe stage    Magnesium deficiency    Neuropathy    Allergic rhinitis due to allergen    Onychomycosis of toenail    Peripheral vascular disease (CMS-HCC)    Palpitations    Primary hyperparathyroidism (Multi)    Primary open angle glaucoma of both eyes, severe stage    Refraction error    Skin mole    Uterine leiomyoma    Vaginal discharge    Vaginitis    Vitamin D deficiency, unspecified    Alcohol use disorder, mild    Abdominal pain, right upper quadrant    Heartburn symptom    Numbness and tingling of foot    Sepsis, due to unspecified organism, unspecified whether acute organ dysfunction present (Multi)    Acute respiratory failure with hypoxia (Multi)    Pleural effusion, bilateral    Esophageal cancer (Multi)     Assessment/Plan      Principal Problem:    Sepsis, due to unspecified organism, unspecified whether acute organ dysfunction present (Multi)  Active Problems:    Acute respiratory failure with hypoxia (Multi)    Pleural effusion, bilateral    Esophageal cancer (Multi)     Acute on chronic respiratory failure with hypoxia: Likely in the setting of left upper lobe collapse, advanced metastatic esophageal carcinoma,  Stable on nasal canula  Left upper lobe collapse/postobstructive pneumonia  Oral antibiotics   Bronchodilators  Bilateral pleural effusions:   Repeat chest xray  Poor prognosis.     But stable for rehab / snf    Jose Cool MD  [facility]

## 2024-05-13 NOTE — PROGRESS NOTES
Music Therapy Note    Debbi Segura was referred by     Therapy Session  Referral Type: New referral this admission  Visit Type: Follow-up visit  Session Start Time: 1328  Session End Time: 1401  Intervention Delivery: In-person  Conflict of Service: None  Family Present for Session: None  Number of staff members present: 2     Pre-assessment  Unable to Assess Reason: Outcomes not applicable  Mood/Affect: Calm, Appropriate  Verbalized Emotional State: Gratitude         Treatment/Interventions  Areas of Focus: Coping, Normalization, Emotional support  Music Therapy Interventions: Empathic listening/validating emotions  Interruption: No  Patient Fell Asleep at End of Session: No    Post-assessment  Unable to Assess Reason: Outcomes not evaluated  Mood/Affect: Calm, Appropriate  Verbalized Emotional State: Gratitude, Enjoyment  Continue Visiting: Yes  Total Session Time (min): 33 minutes    Narrative  Assessment Detail: Pt found laying in bed. Pt stated excitement for services and gratitude for previous visit.  Plan: To improve coping and normalization through music intervention  Intervention: MT held space for pt to discuss many topics including her family, health, and future plans  Evaluation: Pt maintained a positive affect throughout session. Two nurses were in majority of session for care and pt stated feeling grateful that MT was there with her. Pt shared feeling less anxious than when she was in the ICU.  Follow-up: Will follow up throuhgout admission; proper closure made in case of discharge    Education Documentation  No documentation found.

## 2024-05-13 NOTE — CARE PLAN
The patient's goals for the shift include breath better    The clinical goals for the shift include control pain    Over the shift, the patient did not make progress toward the following goals. Barriers to progression include chronic pain. Recommendations to address these barriers include medicate as prescribed, reposition and use alternative ways to divert from pain.

## 2024-05-13 NOTE — NURSING NOTE
Upon rounding right chest mediport is accessed with current dressing dry and intact. Brisk blood return and flushes easily, clamped and Curos cap intact.

## 2024-05-13 NOTE — PROGRESS NOTES
05/13/24 1040   Discharge Planning   Patient expects to be discharged to: Return to [facility]

## 2024-05-13 NOTE — PROGRESS NOTES
Palliative Care Progress Note    Date of Admission: 5/7/2024    Patient is a 63 y.o. female admitted with Sepsis, due to unspecified organism, unspecified whether acute organ dysfunction present (Multi). Now in SDU on 4 liters nasal canula. Nausea and belching during administration of am meds via PEG. Zofran IV x 1, hold TF and water flushes x 1 hr. No dyspnea. Pain rated 6/10 L lung, worsened with nausea/reflux. Remains anxious.       Scheduled medications  amoxicillin-pot clavulanate, 1 tablet, oral, q12h JAIDEN  aspirin, 81 mg, oral, Once  atorvastatin, 20 mg, oral, Nightly  brimonidine, 1 drop, Right Eye, BID  enoxaparin, 40 mg, subcutaneous, Daily  [START ON 5/19/2024] escitalopram, 10 mg, oral, Daily  escitalopram, 5 mg, oral, Daily  pantoprazole, 40 mg, oral, Daily   Or  esomeprazole, 40 mg, nasoduodenal tube, Daily   Or  pantoprazole, 40 mg, intravenous, Daily  fentaNYL, 1 patch, transdermal, q72h  fentaNYL, 1 patch, transdermal, q72h  gabapentin, 250 mg, oral, BID  gabapentin, 500 mg, oral, Nightly  insulin lispro, 0-5 Units, subcutaneous, q4h  ipratropium-albuteroL, 3 mL, nebulization, TID  levETIRAcetam, 750 mg, g-tube, BID  lisinopril, 20 mg, oral, Daily  oxygen, , inhalation, Continuous - Inhalation  psyllium, 1 packet, oral, Daily  sennosides-docusate sodium, 2 tablet, g-tube, BID  thiamine, 100 mg, oral, Daily      Continuous medications     PRN medications  PRN medications: acetaminophen **OR** acetaminophen **OR** acetaminophen, artificial saliva (yerbas-lyt), dextrose, dextrose, glucagon, glucagon, hydrocodone-homatropine, HYDROmorphone, hydrOXYzine HCL, methocarbamol, ondansetron, oxygen, polyethylene glycol, simethicone     Results for orders placed or performed during the hospital encounter of 05/07/24 (from the past 24 hour(s))   POCT GLUCOSE   Result Value Ref Range    POCT Glucose 112 (H) 74 - 99 mg/dL   POCT GLUCOSE   Result Value Ref Range    POCT Glucose 111 (H) 74 - 99 mg/dL   POCT GLUCOSE    Result Value Ref Range    POCT Glucose 83 74 - 99 mg/dL   CBC and Auto Differential   Result Value Ref Range    WBC 14.3 (H) 4.4 - 11.3 x10*3/uL    nRBC 0.4 (H) 0.0 - 0.0 /100 WBCs    RBC 4.13 4.00 - 5.20 x10*6/uL    Hemoglobin 10.3 (L) 12.0 - 16.0 g/dL    Hematocrit 33.4 (L) 36.0 - 46.0 %    MCV 81 80 - 100 fL    MCH 24.9 (L) 26.0 - 34.0 pg    MCHC 30.8 (L) 32.0 - 36.0 g/dL    RDW 19.8 (H) 11.5 - 14.5 %    Platelets 428 150 - 450 x10*3/uL    Immature Granulocytes %, Automated 6.3 (H) 0.0 - 0.9 %    Immature Granulocytes Absolute, Automated 0.90 (H) 0.00 - 0.70 x10*3/uL   Basic Metabolic Panel   Result Value Ref Range    Glucose 82 65 - 99 mg/dL    Sodium 133 133 - 145 mmol/L    Potassium 4.4 3.4 - 5.1 mmol/L    Chloride 98 97 - 107 mmol/L    Bicarbonate 26 24 - 31 mmol/L    Urea Nitrogen 37 (H) 8 - 25 mg/dL    Creatinine 0.90 0.40 - 1.60 mg/dL    eGFR 72 >60 mL/min/1.73m*2    Calcium 9.9 8.5 - 10.4 mg/dL    Anion Gap 9 <=19 mmol/L   Magnesium   Result Value Ref Range    Magnesium 1.90 1.60 - 3.10 mg/dL   Phosphorus   Result Value Ref Range    Phosphorus 3.0 2.5 - 4.5 mg/dL   Manual Differential   Result Value Ref Range    Neutrophils %, Manual 78.0 40.0 - 80.0 %    Bands %, Manual 1.0 0.0 - 5.0 %    Lymphocytes %, Manual 9.0 13.0 - 44.0 %    Monocytes %, Manual 4.0 2.0 - 10.0 %    Eosinophils %, Manual 2.0 0.0 - 6.0 %    Basophils %, Manual 2.0 0.0 - 2.0 %    Atypical Lymphocytes %, Manual 1.0 0.0 - 2.0 %    Metamyelocytes %, Manual 1.0 0.0 - 0.0 %    Myelocytes %, Manual 2.0 0.0 - 0.0 %    Seg Neutrophils Absolute, Manual 11.15 (H) 1.20 - 7.00 x10*3/uL    Bands Absolute, Manual 0.14 0.00 - 0.70 x10*3/uL    Lymphocytes Absolute, Manual 1.29 1.20 - 4.80 x10*3/uL    Monocytes Absolute, Manual 0.57 0.10 - 1.00 x10*3/uL    Eosinophils Absolute, Manual 0.29 0.00 - 0.70 x10*3/uL    Basophils Absolute, Manual 0.29 (H) 0.00 - 0.10 x10*3/uL    Atypical Lymphs Absolute, Manual 0.14 0.00 - 0.50 x10*3/uL    Metamyelocytes  Absolute, Manual 0.14 0.00 - 0.00 x10*3/uL    Myelocytes Absolute, Manual 0.29 0.00 - 0.00 x10*3/uL    Total Cells Counted 100     Neutrophils Absolute, Manual 11.29 (H) 1.20 - 7.70 x10*3/uL    RBC Morphology See Below     Polychromasia Mild     Hypochromia Mild     RBC Fragments Few     Ovalocytes Few     Teardrop Cells Few     Clumped Platelets Present    POCT GLUCOSE   Result Value Ref Range    POCT Glucose 83 74 - 99 mg/dL   POCT GLUCOSE   Result Value Ref Range    POCT Glucose 99 74 - 99 mg/dL   POCT GLUCOSE   Result Value Ref Range    POCT Glucose 112 (H) 74 - 99 mg/dL        XR chest 1 view  Result Date: 5/9/2024  Interpreted By:  Russell Rucker, STUDY: XR CHEST 1 VIEW;  5/9/2024 8:37 am   INDICATION: Signs/Symptoms:hypoxia, eval pleural effusions.   COMPARISON: Most recent prior is from 05/07/2024. CT scan from 05/07/2024.   ACCESSION NUMBER(S): [id number]   ORDERING CLINICIAN: JESI LEON   TECHNIQUE: Single AP portable view of the chest was obtained.   FINDINGS: MEDIASTINUM/ LUNGS/ REGINE: Stable esophageal stent. Stable right-sided central venous MediPort. Progression of small right pleural effusion. Stable cardiomegaly. There are diffuse infiltrative opacities throughout the right lung. Stable collapse of the left upper lobe with hyper aeration of the left lower lobe. Persistent mild haziness at the left lung base. No blunting of the left lateral costophrenic sulcus.   BONES: No lytic or blastic destructive bone lesion.   UPPER ABDOMEN: Grossly intact.       Cardiomegaly.   Small right pleural effusion, mildly progressed.   Grossly stable infiltrative densities throughout the right lung.   Stable collapse of the left upper lobe with stable hyper aeration of the left lung. Mild stable infiltrate/atelectasis at the left lung base.   Stable esophageal stent. Stable right-sided central venous MediPort.   MACRO: None   Signed by: Russell Rucker 5/9/2024 10:37 AM Dictation workstation:   [id number]    XR  chest 1 view  Result Date: 5/8/2024  FINDINGS: There is worsening consolidation within the right upper lobe, right middle lobe. Similar consolidation in the right lower lobe. There is redemonstration of a prominent left basilar opacity representing sub pulmonic effusion. There is redemonstration of complete left upper lobe collapse and left hilar mass representing locally invasive esophageal cancer and malignant lymphadenopathy. No pneumothorax.       Please see above.     MACRO: None.   Signed by: Russell Escalante 5/8/2024 1:28 AM Dictation workstation:   [id number]    CT chest w IV contrast  Result Date: 5/7/2024  FINDINGS: LOWER NECK AND CHEST WALL:  Right chest port catheter.   MEDIASTINUM/REGINE:No lymphadenopathy. Metallic esophageal stent in place. Trace debris within the stent lumen. Confluent soft tissue density within the posterior paratracheal and paraesophageal mediastinum may reflect confluence of adenopathy or locally advanced malignancy.   CARDIOVASCULAR:  Cardiac chamber size within normal limits. Small pericardial effusion. Right chest port catheter tip terminating at the SVC/RA junction. Aortic caliber normal. Normal caliber of main pulmonary artery. Scattered coronary atherosclerotic calcification.   LUNGS, AIRWAYS, AND PLEURA:  Severe focal narrowing of the left mainstem bronchus. There is complete occlusion/filling of the majority of the left lower lobar and left lower lobe segmental bronchi. Moderate bilateral pleural effusions. Moderate emphysema. Patchy ground-glass opacities throughout the right lung and dependent right lower lobe consolidation may reflect an infectious/inflammatory process. Somewhat nodular interlobular septal thickening at the medial right lung base may reflect lymphangitic carcinomatosis. There are spiculated pulmonary nodules within the left upper lobe measuring 2.1 cm, right upper lobe measuring 1.3 cm, and posteroinferior right lower lobe measuring 1.5 cm as well as  the superior segment of the right lower lobe measuring 1.1 cm. There is complete collapse of the left upper lobe and complete filling of the left upper lobar bronchi.   MUSCULOSKELETAL: No acute osseous abnormality or suspicious osseous lesions. Mild multilevel spinal degenerative change.   UPPER ABDOMEN: Unremarkable.       1. Esophageal stent in place with soft tissue attenuation material in the posterior mediastinum surrounding the esophagus and central airways, likely reflecting known locally advanced esophageal squamous cell carcinoma with possible additional superimposed confluent adenopathy. There is complete narrowing/filling of the left upper lobe airway with collapse/consolidation of the left upper lobe. There is partial filling/narrowing of the left lower lobe airway with extensive material filling the central bronchi of the left lower lobe. Extensive patchy ground-glass/consolidative opacities within the right lung concerning for an infectious/inflammatory process. 2. Moderate bilateral pleural effusions. 3. Spiculated bilateral pulmonary nodules, likely metastatic. 4. Small pericardial effusion. 5. Somewhat nodular interlobular septal thickening at the medial right lung base may reflect lymphangitic carcinomatosis.   Signed by: Chaitanya Lovell 5/7/2024 5:02 AM Dictation workstation:   [id number]    XR chest 1 view  Result Date: 5/7/2024  Interpreted By:  Russell Escalante, STUDY: XR CHEST 1 VIEW;  5/7/2024 2:29 am   INDICATION: Signs/Symptoms:Chest Pain.   COMPARISON: 04/06/2017   ACCESSION NUMBER(S): [id number]   ORDERING CLINICIAN: JENN NEWMAN   FINDINGS: There is an esophageal stent. There is a right chest port terminating over the cavoatrial junction.   There is a large left pleural effusion. There is new bilateral hilar enlargement and nodularity likely representing lymphadenopathy. There is left hemithorax volume loss. There is airspace disease at the left lung base. There is  diffuse peribronchovascular and interstitial prominence. No pneumothorax.       Multiple cardiopulmonary abnormalities including probable pulmonary edema, large left pleural effusion, bilateral hilar and mediastinal lymphadenopathy and probable airway obstruction on the left resulting in volume loss of the left hemithorax. CT chest with contrast is recommended for further evaluation.   Esophageal stent consistent with esophageal cancer.   MACRO: None.   Signed by: Russell Escalante 5/7/2024 2:37 AM Dictation workstation:   [id number]       Vitals:    05/13/24 1300   BP: 102/65   Pulse:    Resp: 17   Temp: 36.4 °C (97.5 °F)   SpO2: 96%       Physical Exam  Vitals and nursing note reviewed.   Constitutional:       General: She is not in acute distress.     Appearance: She is ill-appearing.   HENT:      Head: Normocephalic and atraumatic.      Mouth/Throat:      Mouth: Mucous membranes are dry.      Pharynx: Oropharynx is clear.   Eyes:      General: No scleral icterus.     Extraocular Movements: Extraocular movements intact.   Neck:      Comments: Symmetrical neck; cervical rotation decreased  Cardiovascular:      Rate and Rhythm: Regular rhythm. Tachycardia present.      Pulses: Normal pulses.      Heart sounds: Normal heart sounds.   Pulmonary:      Effort: No respiratory distress.      Breath sounds: No wheezing or rales.      Comments: Lungs are clear but diminished, noted conversational dyspnea, now on 4 liters NC  Abdominal:      General: Abdomen is flat. There is no distension.      Palpations: Abdomen is soft.      Tenderness: There is no abdominal tenderness. There is no guarding.   Musculoskeletal:      Right lower leg: No edema.      Left lower leg: No edema.   Skin:     General: Skin is warm and dry.      Findings: No rash.   Neurological:      General: No focal deficit present.      Mental Status: She is alert and oriented to person, place, and time.   Psychiatric:         Mood and Affect: Mood normal.          Behavior: Behavior normal.         Thought Content: Thought content normal.         Judgment: Judgment normal.         Assessment/Plan   Sepsis, due to unspecified organism, unspecified whether acute organ dysfunction present (Multi)   IMP:    Acute hypoxic respiratory failure - 2/2 large left pleural effusion. Bilat lung nodules noted on chest CT likely metastatic disease. Pulm consulted recommending thoracentesis.   Metastatic esophageal cancer - mets to brain and now lungs. Prognosis poor  Anxiety disorder - chronic, on Vistaril outpatient, add citalopram (would favor duloxetine but cannot give via peg)  Brain metastatic disease on Keppra  Chronic pain - generalized resume home regimen with fentanyl Duragesic patch, nhi, prn tylenol and prn methocarbamol      Palliative Care  DNRCCA/DNI  Capable  Son Rio is only child, stated POA but we do not have this documentation. Regardless he is legal surrogate if needed.  Patient and son met with Hospice a few weeks ago when brain mets were identified. She was not ready to shift goals of care and wanted to proceed with radiation therapy at that time.      Main issue with patient has been achieving adequate control of pain and anxiety which has been limiting ability to engage in GOC discussion with the patient and son. Dilaudid frequency increased to q4 prn yesterday. This seems to be helping. She would also benefit from a basal med to control her anxiety as this is ongoing problem and not new. Would favor duloxetine to help with mood and pain control, but cannot give DR capsule via peg. Will start on low dose of Celexa and titrate up as michelle. Will check ECG.    5/13  No acute events overnight. Attempted to readdress goals of care with patient this morning, as she asked my why she was going back to [facility]. I reviewed options of hospice vs LTC, however patient started to experience nausea, reflux during our discussion and discussion was placed on hold due to  discomfort. I discussed case with attending service. Would recommend at least navigator involvement at this time given uncontrolled symptoms. Will need to readdress with patient once nausea resolved.     Total time with patient 30min.   Alicia Whitehead, RUTH-CNP

## 2024-05-13 NOTE — PROGRESS NOTES
"Nutrition Follow up Note    Nutrition Assessment      RN reports patient continues to tolerate tube feeds well. Patient appears to be in a lot of discomfort at time of visit.    Nutrition History:  Food and Nutrient History: PEG tube feeds     Food Allergies/Intolerances:   milk  GI Symptoms: None  Oral Problems: None    Anthropometrics:  Ht: 167.6 cm (5' 5.98\"), Wt: 53 kg (116 lb 13.5 oz), BMI: 18.87  IBW/kg (Dietitian Calculated): 59.09 kg  Percent of IBW: 83 %     Weight Change:  Daily Weight  05/13/24 : 53 kg (116 lb 13.5 oz)  12/21/22 : 59.4 kg (131 lb)  10/19/22 : 63.1 kg (139 lb 3 oz)  08/02/22 : 58.5 kg (129 lb)  05/27/22 : 60.8 kg (134 lb)  04/28/22 : 61.2 kg (135 lb)  04/08/22 : 61.2 kg (135 lb)  08/27/20 : 62.1 kg (137 lb)  06/16/20 : 59.9 kg (132 lb)     Nutrition Focused Physical Exam Findings:   Subcutaneous Fat Loss  Orbital Fat Pads: Severe (dark circles, hollowing and loose skin)  Buccal Fat Pads: Severe (hollow, sunken and narrow face)  Triceps: Severe (negligible fat tissue)  Ribs: Defer    Muscle Wasting  Temporalis: Severe (hollowed scooping depression)  Pectoralis (Clavicular Region): Severe (protruding prominent clavicle)  Deltoid/Trapezius: Severe (squared shoulders, acromion process prominent)  Interosseous: Severe (depressed area between thumb and forefinger)  Trapezius/Infraspinatus/Supraspinatus (Scapular Region): Defer  Quadriceps: Defer  Gastrocnemius: Severe (minimal muscle definition)     Nutrition Significant Labs:  Lab Results   Component Value Date    WBC 14.3 (H) 05/13/2024    HGB 10.3 (L) 05/13/2024    HCT 33.4 (L) 05/13/2024     05/13/2024    CHOL 164 04/28/2022    TRIG 158 (H) 06/16/2020    HDL 53.6 04/28/2022    ALT 9 05/07/2024    AST 14 05/07/2024     05/13/2024    K 4.4 05/13/2024    CL 98 05/13/2024    CREATININE 0.90 05/13/2024    BUN 37 (H) 05/13/2024    CO2 26 05/13/2024    TSH 1.46 08/02/2022    INR 1.0 12/29/2021    HGBA1C 4.9 04/28/2022     Nutrition " Specific Medications:  amoxicillin-pot clavulanate, 1 tablet, oral, q12h JAIDEN  aspirin, 81 mg, oral, Once  atorvastatin, 20 mg, oral, Nightly  brimonidine, 1 drop, Right Eye, BID  enoxaparin, 40 mg, subcutaneous, Daily  [START ON 5/19/2024] escitalopram, 10 mg, oral, Daily  escitalopram, 5 mg, oral, Daily  pantoprazole, 40 mg, oral, Daily   Or  esomeprazole, 40 mg, nasoduodenal tube, Daily   Or  pantoprazole, 40 mg, intravenous, Daily  fentaNYL, 1 patch, transdermal, q72h  fentaNYL, 1 patch, transdermal, q72h  gabapentin, 250 mg, oral, BID  gabapentin, 500 mg, oral, Nightly  insulin lispro, 0-5 Units, subcutaneous, q4h  ipratropium-albuteroL, 3 mL, nebulization, TID  levETIRAcetam, 750 mg, g-tube, BID  lisinopril, 20 mg, oral, Daily  oxygen, , inhalation, Continuous - Inhalation  psyllium, 1 packet, oral, Daily  sennosides-docusate sodium, 2 tablet, g-tube, BID  thiamine, 100 mg, oral, Daily         Dietary Orders (From admission, onward)       Start     Ordered    05/08/24 0935  Enteral feeding with NPO PEG (percutaneous endoscopic gastric); 80 (Start @40 mL/Hr and increase by 10 mL Q4H until goal); 100; Water; Tap water; Every 6 hours  Diet effective now        Question Answer Comment   Tube feeding formula: Product from home - please specify    Tube feed product from home: [employer] 1.0 (Hospital to provide)    Feeding route: PEG (percutaneous endoscopic gastric)    Tube feeding continuous rate (mL/hr): 80 Start @40 mL/Hr and increase by 10 mL Q4H until goal   Tube feeding flush (mL): 100    Flush type: Water    Water type: Tap water    Flush frequency: Every 6 hours        05/08/24 0937                   Estimated Needs:   Estimated Energy Needs  Total Energy Estimated Needs (kCal):  (4589-3295)  Total Estimated Energy Need per Day (kCal/kg):  (35-40)  Method for Estimating Needs: Actual Wt    Estimated Protein Needs  Total Protein Estimated Needs (g):  (59-98)  Total Protein Estimated Needs (g/kg):   (1.2-2.0)  Method for Estimating Needs: Actual Wt    Estimated Fluid Needs  Total Fluid Estimated Needs (mL):  (9650-2006)  Method for Estimating Needs: 1 mL/kcal      Nutrition Diagnosis   Nutrition Diagnosis:  Malnutrition Diagnosis  Patient has Malnutrition Diagnosis: Yes  Diagnosis Status: Ongoing  Malnutrition Diagnosis: Severe malnutrition related to chronic disease or condition  As Evidenced by: Severe subcutaneous fat and muscle wasting    Nutrition Diagnosis  Patient has Nutrition Diagnosis: Yes  Diagnosis Status (1): Resolved  Nutrition Diagnosis 1: Inadequate enteral nutrition infusion  Related to (1): decreased ability to consume sufficient energy  As Evidenced by (1): NPO     Nutrition Interventions/Recommendations   Nutrition Interventions and Recommendations:    Nutrition Prescription:  Individualized Nutrition Prescription Provided for : 7660-4380 calories,  gm protein to be provided via enteral nutrition    Nutrition Interventions:   Food and/or Nutrient Delivery Interventions  Interventions: Enteral intake  Enteral Intake: Other (Comment)  Goal: Provide as ordered    Education Documentation  No documentation found.         Nutrition Monitoring and Evaluation   Monitoring/Evaluation:   Food/Nutrient Related History Monitoring  Monitoring and Evaluation Plan: Enteral and parenteral nutrition intake  Enteral and Parenteral Nutrition Intake: Enteral nutrition formula/solution  Criteria: Monitoring for tube feed tolerance    Body Composition/Growth/Weight History  Monitoring and Evaluation Plan: Weight  Weight: Measured weight  Criteria: Patient will maintain/gain weight       Time Spent/Follow-up:   Follow Up  Time Spent (min): 20 minutes  Last Date of Nutrition Visit: 05/13/24  Nutrition Follow-Up Needed?: 3-5 days  Follow up Comment: 5/16/24

## 2024-05-14 LAB
ANION GAP SERPL CALC-SCNC: 6 MMOL/L
BASOPHILS # BLD MANUAL: 0 X10*3/UL (ref 0–0.1)
BASOPHILS NFR BLD MANUAL: 0 %
BUN SERPL-MCNC: 29 MG/DL (ref 8–25)
CALCIUM SERPL-MCNC: 10.7 MG/DL (ref 8.5–10.4)
CHLORIDE SERPL-SCNC: 97 MMOL/L (ref 97–107)
CO2 SERPL-SCNC: 26 MMOL/L (ref 24–31)
CREAT SERPL-MCNC: 0.8 MG/DL (ref 0.4–1.6)
EGFRCR SERPLBLD CKD-EPI 2021: 82 ML/MIN/1.73M*2
EOSINOPHIL # BLD MANUAL: 0.14 X10*3/UL (ref 0–0.7)
EOSINOPHIL NFR BLD MANUAL: 1 %
ERYTHROCYTE [DISTWIDTH] IN BLOOD BY AUTOMATED COUNT: 19.8 % (ref 11.5–14.5)
GLUCOSE BLD MANUAL STRIP-MCNC: 102 MG/DL (ref 74–99)
GLUCOSE BLD MANUAL STRIP-MCNC: 105 MG/DL (ref 74–99)
GLUCOSE BLD MANUAL STRIP-MCNC: 124 MG/DL (ref 74–99)
GLUCOSE BLD MANUAL STRIP-MCNC: 126 MG/DL (ref 74–99)
GLUCOSE BLD MANUAL STRIP-MCNC: 94 MG/DL (ref 74–99)
GLUCOSE BLD MANUAL STRIP-MCNC: 96 MG/DL (ref 74–99)
GLUCOSE SERPL-MCNC: 112 MG/DL (ref 65–99)
HCT VFR BLD AUTO: 32.4 % (ref 36–46)
HGB BLD-MCNC: 9.9 G/DL (ref 12–16)
HYPOCHROMIA BLD QL SMEAR: ABNORMAL
IMM GRANULOCYTES # BLD AUTO: 0.89 X10*3/UL (ref 0–0.7)
IMM GRANULOCYTES NFR BLD AUTO: 6.4 % (ref 0–0.9)
LYMPHOCYTES # BLD MANUAL: 1.67 X10*3/UL (ref 1.2–4.8)
LYMPHOCYTES NFR BLD MANUAL: 12 %
MAGNESIUM SERPL-MCNC: 2 MG/DL (ref 1.6–3.1)
MCH RBC QN AUTO: 25.1 PG (ref 26–34)
MCHC RBC AUTO-ENTMCNC: 30.6 G/DL (ref 32–36)
MCV RBC AUTO: 82 FL (ref 80–100)
METAMYELOCYTES # BLD MANUAL: 0.56 X10*3/UL
METAMYELOCYTES NFR BLD MANUAL: 4 %
MONOCYTES # BLD MANUAL: 0.97 X10*3/UL (ref 0.1–1)
MONOCYTES NFR BLD MANUAL: 7 %
MYELOCYTES # BLD MANUAL: 0.14 X10*3/UL
MYELOCYTES NFR BLD MANUAL: 1 %
NEUTROPHILS # BLD MANUAL: 10.43 X10*3/UL (ref 1.2–7.7)
NEUTS BAND # BLD MANUAL: 0.14 X10*3/UL (ref 0–0.7)
NEUTS BAND NFR BLD MANUAL: 1 %
NEUTS SEG # BLD MANUAL: 10.29 X10*3/UL (ref 1.2–7)
NEUTS SEG NFR BLD MANUAL: 74 %
NRBC BLD-RTO: 0.1 /100 WBCS (ref 0–0)
OVALOCYTES BLD QL SMEAR: ABNORMAL
PHOSPHATE SERPL-MCNC: 2.9 MG/DL (ref 2.5–4.5)
PLATELET # BLD AUTO: 458 X10*3/UL (ref 150–450)
PLATELET CLUMP BLD QL SMEAR: PRESENT
POLYCHROMASIA BLD QL SMEAR: ABNORMAL
POTASSIUM SERPL-SCNC: 4.7 MMOL/L (ref 3.4–5.1)
RBC # BLD AUTO: 3.95 X10*6/UL (ref 4–5.2)
RBC MORPH BLD: ABNORMAL
SCHISTOCYTES BLD QL SMEAR: ABNORMAL
SODIUM SERPL-SCNC: 129 MMOL/L (ref 133–145)
TOTAL CELLS COUNTED BLD: 100
WBC # BLD AUTO: 13.9 X10*3/UL (ref 4.4–11.3)

## 2024-05-14 PROCEDURE — 9420000001 HC RT PATIENT EDUCATION 5 MIN

## 2024-05-14 PROCEDURE — 2500000001 HC RX 250 WO HCPCS SELF ADMINISTERED DRUGS (ALT 637 FOR MEDICARE OP)

## 2024-05-14 PROCEDURE — 2500000002 HC RX 250 W HCPCS SELF ADMINISTERED DRUGS (ALT 637 FOR MEDICARE OP, ALT 636 FOR OP/ED): Performed by: INTERNAL MEDICINE

## 2024-05-14 PROCEDURE — 80048 BASIC METABOLIC PNL TOTAL CA: CPT

## 2024-05-14 PROCEDURE — 82947 ASSAY GLUCOSE BLOOD QUANT: CPT

## 2024-05-14 PROCEDURE — 83735 ASSAY OF MAGNESIUM: CPT

## 2024-05-14 PROCEDURE — 2500000004 HC RX 250 GENERAL PHARMACY W/ HCPCS (ALT 636 FOR OP/ED)

## 2024-05-14 PROCEDURE — 2500000001 HC RX 250 WO HCPCS SELF ADMINISTERED DRUGS (ALT 637 FOR MEDICARE OP): Performed by: NURSE PRACTITIONER

## 2024-05-14 PROCEDURE — 94640 AIRWAY INHALATION TREATMENT: CPT

## 2024-05-14 PROCEDURE — 2500000005 HC RX 250 GENERAL PHARMACY W/O HCPCS

## 2024-05-14 PROCEDURE — 2500000004 HC RX 250 GENERAL PHARMACY W/ HCPCS (ALT 636 FOR OP/ED): Performed by: INTERNAL MEDICINE

## 2024-05-14 PROCEDURE — 84100 ASSAY OF PHOSPHORUS: CPT

## 2024-05-14 PROCEDURE — 2500000006 HC RX 250 W HCPCS SELF ADMINISTERED DRUGS (ALT 637 FOR ALL PAYERS): Performed by: NURSE PRACTITIONER

## 2024-05-14 PROCEDURE — 85007 BL SMEAR W/DIFF WBC COUNT: CPT

## 2024-05-14 PROCEDURE — 2500000004 HC RX 250 GENERAL PHARMACY W/ HCPCS (ALT 636 FOR OP/ED): Performed by: NURSE PRACTITIONER

## 2024-05-14 PROCEDURE — 99233 SBSQ HOSP IP/OBS HIGH 50: CPT | Performed by: NURSE PRACTITIONER

## 2024-05-14 PROCEDURE — 2060000001 HC INTERMEDIATE ICU ROOM DAILY

## 2024-05-14 PROCEDURE — 99497 ADVNCD CARE PLAN 30 MIN: CPT | Performed by: NURSE PRACTITIONER

## 2024-05-14 PROCEDURE — 85027 COMPLETE CBC AUTOMATED: CPT

## 2024-05-14 PROCEDURE — 2500000001 HC RX 250 WO HCPCS SELF ADMINISTERED DRUGS (ALT 637 FOR MEDICARE OP): Performed by: INTERNAL MEDICINE

## 2024-05-14 PROCEDURE — 2500000005 HC RX 250 GENERAL PHARMACY W/O HCPCS: Performed by: INTERNAL MEDICINE

## 2024-05-14 RX ORDER — GABAPENTIN 250 MG/5ML
250 SOLUTION ORAL 2 TIMES DAILY
Start: 2024-05-14

## 2024-05-14 RX ORDER — ESCITALOPRAM OXALATE 10 MG/1
10 TABLET ORAL DAILY
Start: 2024-05-19

## 2024-05-14 RX ORDER — OXYCODONE HCL 5 MG/5 ML
10 SOLUTION, ORAL ORAL
Status: DISCONTINUED | OUTPATIENT
Start: 2024-05-14 | End: 2024-05-15 | Stop reason: HOSPADM

## 2024-05-14 RX ORDER — ESCITALOPRAM OXALATE 5 MG/1
5 TABLET ORAL DAILY
Start: 2024-05-15 | End: 2024-05-19

## 2024-05-14 RX ORDER — OXYCODONE HCL 5 MG/5 ML
10 SOLUTION, ORAL ORAL
Start: 2024-05-14

## 2024-05-14 RX ORDER — SUCRALFATE 1 G/10ML
1 SUSPENSION ORAL EVERY 6 HOURS SCHEDULED
Status: DISCONTINUED | OUTPATIENT
Start: 2024-05-14 | End: 2024-05-15 | Stop reason: HOSPADM

## 2024-05-14 RX ORDER — METHOCARBAMOL 500 MG/1
500 TABLET, FILM COATED ORAL EVERY 8 HOURS PRN
Start: 2024-05-14

## 2024-05-14 RX ORDER — SUCRALFATE 1 G/10ML
1 SUSPENSION ORAL EVERY 6 HOURS SCHEDULED
Start: 2024-05-14

## 2024-05-14 RX ORDER — AMOXICILLIN 250 MG
2 CAPSULE ORAL 2 TIMES DAILY
Start: 2024-05-14

## 2024-05-14 RX ORDER — FENTANYL 50 UG/1
1 PATCH TRANSDERMAL
Start: 2024-05-16 | End: 2024-06-15

## 2024-05-14 RX ORDER — GABAPENTIN 250 MG/5ML
500 SOLUTION ORAL NIGHTLY
Start: 2024-05-14

## 2024-05-14 RX ORDER — HYDROMORPHONE HYDROCHLORIDE 1 MG/ML
1 INJECTION, SOLUTION INTRAMUSCULAR; INTRAVENOUS; SUBCUTANEOUS EVERY 4 HOURS PRN
Status: DISCONTINUED | OUTPATIENT
Start: 2024-05-14 | End: 2024-05-15 | Stop reason: HOSPADM

## 2024-05-14 RX ORDER — AMOXICILLIN AND CLAVULANATE POTASSIUM 875; 125 MG/1; MG/1
1 TABLET, FILM COATED ORAL EVERY 12 HOURS SCHEDULED
Start: 2024-05-14 | End: 2024-05-21

## 2024-05-14 RX ORDER — PANTOPRAZOLE SODIUM 40 MG/1
40 TABLET, DELAYED RELEASE ORAL DAILY
Start: 2024-05-15

## 2024-05-14 RX ADMIN — AMOXICILLIN AND CLAVULANATE POTASSIUM 1 TABLET: 875; 125 TABLET, FILM COATED ORAL at 21:10

## 2024-05-14 RX ADMIN — SUCRALFATE 1 G: 1 SUSPENSION ORAL at 23:20

## 2024-05-14 RX ADMIN — HYDROMORPHONE HYDROCHLORIDE 1 MG: 1 INJECTION, SOLUTION INTRAMUSCULAR; INTRAVENOUS; SUBCUTANEOUS at 17:50

## 2024-05-14 RX ADMIN — BRIMONIDINE TARTRATE 1 DROP: 2 SOLUTION/ DROPS OPHTHALMIC at 21:10

## 2024-05-14 RX ADMIN — ENOXAPARIN SODIUM 40 MG: 40 INJECTION SUBCUTANEOUS at 10:36

## 2024-05-14 RX ADMIN — GABAPENTIN 250 MG: 250 SOLUTION ORAL at 10:35

## 2024-05-14 RX ADMIN — Medication 3 L/MIN: at 08:00

## 2024-05-14 RX ADMIN — GABAPENTIN 500 MG: 250 SOLUTION ORAL at 21:09

## 2024-05-14 RX ADMIN — OXYCODONE HYDROCHLORIDE 10 MG: 5 SOLUTION ORAL at 09:16

## 2024-05-14 RX ADMIN — HYDROMORPHONE HYDROCHLORIDE 1 MG: 1 INJECTION, SOLUTION INTRAMUSCULAR; INTRAVENOUS; SUBCUTANEOUS at 04:38

## 2024-05-14 RX ADMIN — HYDROMORPHONE HYDROCHLORIDE 1 MG: 1 INJECTION, SOLUTION INTRAMUSCULAR; INTRAVENOUS; SUBCUTANEOUS at 01:02

## 2024-05-14 RX ADMIN — HYDROMORPHONE HYDROCHLORIDE 1 MG: 1 INJECTION, SOLUTION INTRAMUSCULAR; INTRAVENOUS; SUBCUTANEOUS at 10:35

## 2024-05-14 RX ADMIN — SUCRALFATE 1 G: 1 SUSPENSION ORAL at 17:50

## 2024-05-14 RX ADMIN — IPRATROPIUM BROMIDE AND ALBUTEROL SULFATE 3 ML: 2.5; .5 SOLUTION RESPIRATORY (INHALATION) at 19:23

## 2024-05-14 RX ADMIN — OXYCODONE HYDROCHLORIDE 10 MG: 5 SOLUTION ORAL at 21:11

## 2024-05-14 RX ADMIN — ESCITALOPRAM OXALATE 5 MG: 10 TABLET ORAL at 10:36

## 2024-05-14 RX ADMIN — IPRATROPIUM BROMIDE AND ALBUTEROL SULFATE 3 ML: 2.5; .5 SOLUTION RESPIRATORY (INHALATION) at 08:05

## 2024-05-14 RX ADMIN — DOCUSATE SODIUM 50 MG AND SENNOSIDES 8.6 MG 2 TABLET: 8.6; 5 TABLET, FILM COATED ORAL at 13:57

## 2024-05-14 RX ADMIN — IPRATROPIUM BROMIDE AND ALBUTEROL SULFATE 3 ML: 2.5; .5 SOLUTION RESPIRATORY (INHALATION) at 12:56

## 2024-05-14 RX ADMIN — PSYLLIUM HUSK 1 PACKET: 3.4 POWDER ORAL at 10:36

## 2024-05-14 RX ADMIN — HYDROMORPHONE HYDROCHLORIDE 1 MG: 1 INJECTION, SOLUTION INTRAMUSCULAR; INTRAVENOUS; SUBCUTANEOUS at 23:20

## 2024-05-14 RX ADMIN — Medication 3 L/MIN: at 20:00

## 2024-05-14 RX ADMIN — Medication 100 MG: at 10:36

## 2024-05-14 RX ADMIN — AMOXICILLIN AND CLAVULANATE POTASSIUM 1 TABLET: 875; 125 TABLET, FILM COATED ORAL at 10:36

## 2024-05-14 RX ADMIN — BRIMONIDINE TARTRATE 1 DROP: 2 SOLUTION/ DROPS OPHTHALMIC at 10:34

## 2024-05-14 RX ADMIN — LEVETIRACETAM 750 MG: 100 SOLUTION ORAL at 10:36

## 2024-05-14 RX ADMIN — ONDANSETRON HYDROCHLORIDE 8 MG: 4 TABLET, FILM COATED ORAL at 21:11

## 2024-05-14 RX ADMIN — ESOMEPRAZOLE MAGNESIUM 40 MG: 40 GRANULE, DELAYED RELEASE ORAL at 10:36

## 2024-05-14 RX ADMIN — HYDROCODONE BITARTRATE AND HOMATROPINE METHYLBROMIDE 5 ML: 5; 1.5 SOLUTION ORAL at 21:11

## 2024-05-14 RX ADMIN — SUCRALFATE 1 G: 1 SUSPENSION ORAL at 11:11

## 2024-05-14 RX ADMIN — ATORVASTATIN CALCIUM 20 MG: 20 TABLET, FILM COATED ORAL at 21:10

## 2024-05-14 RX ADMIN — LEVETIRACETAM 750 MG: 100 SOLUTION ORAL at 21:10

## 2024-05-14 RX ADMIN — GABAPENTIN 250 MG: 250 SOLUTION ORAL at 14:03

## 2024-05-14 RX ADMIN — OXYCODONE HYDROCHLORIDE 10 MG: 5 SOLUTION ORAL at 14:12

## 2024-05-14 ASSESSMENT — COGNITIVE AND FUNCTIONAL STATUS - GENERAL
DRESSING REGULAR UPPER BODY CLOTHING: A LOT
MOVING FROM LYING ON BACK TO SITTING ON SIDE OF FLAT BED WITH BEDRAILS: A LITTLE
DAILY ACTIVITIY SCORE: 9
DAILY ACTIVITIY SCORE: 9
MOBILITY SCORE: 12
EATING MEALS: TOTAL
PERSONAL GROOMING: TOTAL
TURNING FROM BACK TO SIDE WHILE IN FLAT BAD: A LITTLE
DRESSING REGULAR LOWER BODY CLOTHING: A LOT
TOILETING: TOTAL
MOVING TO AND FROM BED TO CHAIR: A LOT
TURNING FROM BACK TO SIDE WHILE IN FLAT BAD: A LITTLE
CLIMB 3 TO 5 STEPS WITH RAILING: TOTAL
HELP NEEDED FOR BATHING: A LOT
DRESSING REGULAR LOWER BODY CLOTHING: A LOT
HELP NEEDED FOR BATHING: A LOT
MOBILITY SCORE: 12
EATING MEALS: TOTAL
STANDING UP FROM CHAIR USING ARMS: A LOT
WALKING IN HOSPITAL ROOM: TOTAL
CLIMB 3 TO 5 STEPS WITH RAILING: TOTAL
MOVING FROM LYING ON BACK TO SITTING ON SIDE OF FLAT BED WITH BEDRAILS: A LITTLE
PERSONAL GROOMING: TOTAL
DRESSING REGULAR UPPER BODY CLOTHING: A LOT
TOILETING: TOTAL
STANDING UP FROM CHAIR USING ARMS: A LOT
WALKING IN HOSPITAL ROOM: TOTAL
MOVING TO AND FROM BED TO CHAIR: A LOT

## 2024-05-14 ASSESSMENT — PAIN SCALES - GENERAL
PAINLEVEL_OUTOF10: 10 - WORST POSSIBLE PAIN
PAINLEVEL_OUTOF10: 10 - WORST POSSIBLE PAIN
PAINLEVEL_OUTOF10: 0 - NO PAIN
PAINLEVEL_OUTOF10: 5 - MODERATE PAIN
PAINLEVEL_OUTOF10: 8
PAINLEVEL_OUTOF10: 9
PAINLEVEL_OUTOF10: 8
PAINLEVEL_OUTOF10: 0 - NO PAIN
PAINLEVEL_OUTOF10: 3
PAINLEVEL_OUTOF10: 8
PAINLEVEL_OUTOF10: 9

## 2024-05-14 ASSESSMENT — PAIN DESCRIPTION - DESCRIPTORS
DESCRIPTORS: ACHING

## 2024-05-14 ASSESSMENT — PAIN DESCRIPTION - ORIENTATION: ORIENTATION: MID

## 2024-05-14 ASSESSMENT — PAIN DESCRIPTION - LOCATION
LOCATION: BACK
LOCATION: BACK

## 2024-05-14 NOTE — PROGRESS NOTES
Pulmonary Progress Note    Debbi Segura is a 63 y.o. female on day 7 of admission presenting with Sepsis, due to unspecified organism, unspecified whether acute organ dysfunction present (Multi).    Subjective   Awake today    Objective   Vital Signs      5/13/2024     1:00 PM 5/13/2024     4:35 PM 5/13/2024     8:46 PM 5/14/2024    12:00 AM 5/14/2024     4:00 AM 5/14/2024     6:00 AM 5/14/2024     8:45 AM   Vitals   Systolic 102 94 82 82 93  93   Diastolic 65 63 56 56 60  62   Heart Rate 108 108 111       Temp 36.4 °C (97.5 °F) 36.8 °C (98.2 °F) 37.7 °C (99.9 °F) 36 °C (96.8 °F) 36.6 °C (97.9 °F)  37 °C (98.6 °F)   Resp 17 16 16 16 18  17   Weight (lb)      112.43    BMI      18.16 kg/m2    BSA (m2)      1.54 m2        Oxygen Therapy  SpO2: 95 %  Medical Gas Therapy: Supplemental oxygen  O2 Delivery Method: Nasal cannula (3)    FiO2 (%):  [32 %-36 %] 32 %    Intake/Output previous 24 hours:    Intake/Output Summary (Last 24 hours) at 5/14/2024 1018  Last data filed at 5/13/2024 1900  Gross per 24 hour   Intake 1100 ml   Output 700 ml   Net 400 ml       Physical Exam  Vitals reviewed.   Constitutional:       Appearance: Normal appearance.   HENT:      Head: Normocephalic and atraumatic.      Mouth/Throat:      Mouth: Mucous membranes are moist.   Eyes:      Extraocular Movements: Extraocular movements intact.      Pupils: Pupils are equal, round, and reactive to light.   Cardiovascular:      Rate and Rhythm: Normal rate and regular rhythm.   Pulmonary:      Effort: Pulmonary effort is normal.      Breath sounds: Normal breath sounds and air entry.   Abdominal:      General: Abdomen is flat. Bowel sounds are normal.      Palpations: Abdomen is soft.   Musculoskeletal:         General: Normal range of motion.      Cervical back: Normal range of motion and neck supple.   Skin:     General: Skin is warm and dry.   Neurological:      General: No focal deficit present.      Mental Status: She is alert and oriented to  person, place, and time. Mental status is at baseline.       ...  Lines and Tubes:  Peripheral IV 05/08/24 Right;Anterior Forearm (Active)   Placement Date/Time: 05/08/24 0000   Orientation: Right;Anterior  Location: Forearm  Site Prep: Alcohol   Number of days: 5       Implantable Port Right Chest Single lumen port (Active)   Earliest Known Present: 04/16/24   Placed by External Staff?: Other hospital  Orientation: Right  Implantable Port Location: Chest  Port Type: Single lumen port   Number of days: 27       Gastrostomy/Enterostomy PEG-jejunostomy LUQ (Active)   No placement date or time found.   Type: PEG-jejunostomy  Location: LUQ   Number of days:        External Urinary Catheter Female (Active)   Placement Date/Time: 05/08/24 0000   Hand Hygiene Completed: Yes  External Catheter Type: Female   Number of days: 5         Scheduled medications  amoxicillin-pot clavulanate, 1 tablet, oral, q12h JAIDEN  aspirin, 81 mg, oral, Once  atorvastatin, 20 mg, oral, Nightly  brimonidine, 1 drop, Right Eye, BID  enoxaparin, 40 mg, subcutaneous, Daily  [START ON 5/19/2024] escitalopram, 10 mg, oral, Daily  escitalopram, 5 mg, oral, Daily  pantoprazole, 40 mg, oral, Daily   Or  esomeprazole, 40 mg, nasoduodenal tube, Daily   Or  pantoprazole, 40 mg, intravenous, Daily  fentaNYL, 1 patch, transdermal, q72h  fentaNYL, 1 patch, transdermal, q72h  gabapentin, 250 mg, oral, BID  gabapentin, 500 mg, oral, Nightly  insulin lispro, 0-5 Units, subcutaneous, q4h  ipratropium-albuteroL, 3 mL, nebulization, TID  levETIRAcetam, 750 mg, g-tube, BID  lisinopril, 20 mg, oral, Daily  oxygen, , inhalation, Continuous - Inhalation  psyllium, 1 packet, oral, Daily  sennosides-docusate sodium, 2 tablet, g-tube, BID  sucralfate, 1 g, oral, q6h JAIDEN  thiamine, 100 mg, oral, Daily      Continuous medications     PRN medications  PRN medications: acetaminophen **OR** acetaminophen **OR** acetaminophen, artificial saliva (yerbas-lyt), dextrose, dextrose,  glucagon, glucagon, hydrocodone-homatropine, HYDROmorphone, hydrOXYzine HCL, methocarbamol, ondansetron, oxyCODONE, oxygen, polyethylene glycol, simethicone    Relevant Results  Results from last 7 days   Lab Units 05/14/24  0409 05/13/24 0418 05/12/24  0339   WBC AUTO x10*3/uL 13.9* 14.3* 14.2*   HEMOGLOBIN g/dL 9.9* 10.3* 9.7*   HEMATOCRIT % 32.4* 33.4* 31.9*   PLATELETS AUTO x10*3/uL 458* 428 375      Results from last 7 days   Lab Units 05/14/24  0409 05/13/24 0418 05/12/24  0339   SODIUM mmol/L 129* 133 133   POTASSIUM mmol/L 4.7 4.4 4.3   CHLORIDE mmol/L 97 98 100   CO2 mmol/L 26 26 23*   BUN mg/dL 29* 37* 33*   CREATININE mg/dL 0.80 0.90 1.00   GLUCOSE mg/dL 112* 82 93   CALCIUM mg/dL 10.7* 9.9 10.5*      Results from last 7 days   Lab Units 05/08/24  0440   POCT PH, ARTERIAL pH 7.47*   POCT PCO2, ARTERIAL mm Hg 36*   POCT PO2, ARTERIAL mm Hg 68*   POCT HCO3 CALCULATED, ARTERIAL mmol/L 26.2*   POCT BASE EXCESS, ARTERIAL mmol/L 2.5     XR chest 1 view 05/09/2024    Narrative  Interpreted By:  Russell Rucker,  STUDY:  XR CHEST 1 VIEW;  5/9/2024 8:37 am    INDICATION:  Signs/Symptoms:hypoxia, eval pleural effusions.    COMPARISON:  Most recent prior is from 05/07/2024. CT scan from 05/07/2024.    ACCESSION NUMBER(S):  [id number]    ORDERING CLINICIAN:  JESI LEON    TECHNIQUE:  Single AP portable view of the chest was obtained.    FINDINGS:  MEDIASTINUM/ LUNGS/ REGINE:  Stable esophageal stent. Stable right-sided central venous MediPort.  Progression of small right pleural effusion. Stable cardiomegaly.  There are diffuse infiltrative opacities throughout the right lung.  Stable collapse of the left upper lobe with hyper aeration of the  left lower lobe. Persistent mild haziness at the left lung base. No  blunting of the left lateral costophrenic sulcus.    BONES:  No lytic or blastic destructive bone lesion.    UPPER ABDOMEN:  Grossly intact.    Impression  Cardiomegaly.    Small right pleural effusion,  mildly progressed.    Grossly stable infiltrative densities throughout the right lung.    Stable collapse of the left upper lobe with stable hyper aeration of  the left lung. Mild stable infiltrate/atelectasis at the left lung  base.    Stable esophageal stent. Stable right-sided central venous MediPort.    MACRO:  None    Signed by: Russell Rucker 5/9/2024 10:37 AM  Dictation workstation:   [id number]      Patient Active Problem List   Diagnosis    Abscess of labia    Accessory skin tags    Age-related nuclear cataract of right eye    Anorexia    Anxiety and depression    Blepharitis of both eyes    Blurred vision, right eye    Breast mass, right    CKD (chronic kidney disease)    Claudication (CMS-[facility])    Colitis    Combined form of age-related cataract, left eye    Combined form of age-related cataract, right eye    Diarrhea    Dry eyes, bilateral    Dyslipidemia    Eczema    Elevated IOP    Fatigue    Hair changes    Hot flashes    HTN (hypertension)    Hypercalcemia    Hypokalemia    Ischemic optic neuropathy, bilateral    Left knee pain    Low-tension glaucoma, bilateral, severe stage    Magnesium deficiency    Neuropathy    Allergic rhinitis due to allergen    Onychomycosis of toenail    Peripheral vascular disease (CMS-[facility])    Palpitations    Primary hyperparathyroidism (Multi)    Primary open angle glaucoma of both eyes, severe stage    Refraction error    Skin mole    Uterine leiomyoma    Vaginal discharge    Vaginitis    Vitamin D deficiency, unspecified    Alcohol use disorder, mild    Abdominal pain, right upper quadrant    Heartburn symptom    Numbness and tingling of foot    Sepsis, due to unspecified organism, unspecified whether acute organ dysfunction present (Multi)    Acute respiratory failure with hypoxia (Multi)    Pleural effusion, bilateral    Esophageal cancer (Multi)     Assessment/Plan     Principal Problem:    Sepsis, due to unspecified organism, unspecified whether acute organ  dysfunction present (Multi)  Active Problems:    Acute respiratory failure with hypoxia (Multi)    Pleural effusion, bilateral    Esophageal cancer (Multi)     Acute on chronic respiratory failure with hypoxia: Likely in the setting of left upper lobe collapse, advanced metastatic esophageal carcinoma,  Stable on nasal canula    Left upper lobe collapse/postobstructive pneumonia  Oral antibiotics   Bronchodilators    Pleural effusion:   Repeat chest xray with left hilar mass/adenopathy with left upper lobe atelectasis and  accompanying left pleural effusion. Stableepeat chest xray    Poor prognosis.     But stable for rehab / snf    Jose Cool MD  [facility]

## 2024-05-14 NOTE — PROGRESS NOTES
Debbi Segura is a 63 y.o. female on day 7 of admission presenting with Sepsis, due to unspecified organism, unspecified whether acute organ dysfunction present (Multi).      Subjective  Patient sleeping but arousable on exam  Endorses right shoulder pain  No chest pain, nausea or vomiting  T max 37.7    Objective        Last Recorded Vitals      5/13/2024     1:00 PM 5/13/2024     4:35 PM 5/13/2024     8:46 PM 5/14/2024    12:00 AM 5/14/2024     4:00 AM 5/14/2024     6:00 AM 5/14/2024     8:45 AM   Vitals   Systolic 102 94 82 82 93  93   Diastolic 65 63 56 56 60  62   Heart Rate 108 108 111       Temp 36.4 °C (97.5 °F) 36.8 °C (98.2 °F) 37.7 °C (99.9 °F) 36 °C (96.8 °F) 36.6 °C (97.9 °F)  37 °C (98.6 °F)   Resp 17 16 16 16 18  17   Weight (lb)      112.43    BMI      18.16 kg/m2    BSA (m2)      1.54 m2        Imaging  XR chest 1 view    Result Date: 5/13/2024  Interpreted By:  Kathleen Britton, STUDY: XR CHEST 1 VIEW 5/13/2024 11:18 am   INDICATION: Signs/Symptoms:pneumonia   COMPARISON: 05/09/2024   ACCESSION NUMBER(S): [id number]   ORDERING CLINICIAN: FIORELLA BAÑUELOS   TECHNIQUE: AP erect view of the chest   FINDINGS: The cardiac size is mildly enlarged with calcified plaque in the aortic arch. A stent is seen within the thoracic esophagus. There is central venous access catheter terminating in the SVC.   Right lung is clear. On the left, there is continued volume loss affecting the left upper lobe with accompanying left pleural effusion. Left hilar mass/adenopathy is seen.       Left hilar mass/adenopathy with left upper lobe atelectasis and accompanying left pleural effusion.   Right lung is grossly clear at this time however.   Stable positioning of esophageal stent.   Signed by: Kathleen Britton 5/13/2024 12:58 PM Dictation workstation:   [id number]       Relevant Results  Results for orders placed or performed during the hospital encounter of 05/07/24 (from the past 24 hour(s))   POCT GLUCOSE   Result Value Ref  Range    POCT Glucose 112 (H) 74 - 99 mg/dL   POCT GLUCOSE   Result Value Ref Range    POCT Glucose 105 (H) 74 - 99 mg/dL   POCT GLUCOSE   Result Value Ref Range    POCT Glucose 120 (H) 74 - 99 mg/dL   POCT GLUCOSE   Result Value Ref Range    POCT Glucose 105 (H) 74 - 99 mg/dL   POCT GLUCOSE   Result Value Ref Range    POCT Glucose 124 (H) 74 - 99 mg/dL   CBC and Auto Differential   Result Value Ref Range    WBC 13.9 (H) 4.4 - 11.3 x10*3/uL    nRBC 0.1 (H) 0.0 - 0.0 /100 WBCs    RBC 3.95 (L) 4.00 - 5.20 x10*6/uL    Hemoglobin 9.9 (L) 12.0 - 16.0 g/dL    Hematocrit 32.4 (L) 36.0 - 46.0 %    MCV 82 80 - 100 fL    MCH 25.1 (L) 26.0 - 34.0 pg    MCHC 30.6 (L) 32.0 - 36.0 g/dL    RDW 19.8 (H) 11.5 - 14.5 %    Platelets 458 (H) 150 - 450 x10*3/uL    Immature Granulocytes %, Automated 6.4 (H) 0.0 - 0.9 %    Immature Granulocytes Absolute, Automated 0.89 (H) 0.00 - 0.70 x10*3/uL   Basic Metabolic Panel   Result Value Ref Range    Glucose 112 (H) 65 - 99 mg/dL    Sodium 129 (L) 133 - 145 mmol/L    Potassium 4.7 3.4 - 5.1 mmol/L    Chloride 97 97 - 107 mmol/L    Bicarbonate 26 24 - 31 mmol/L    Urea Nitrogen 29 (H) 8 - 25 mg/dL    Creatinine 0.80 0.40 - 1.60 mg/dL    eGFR 82 >60 mL/min/1.73m*2    Calcium 10.7 (H) 8.5 - 10.4 mg/dL    Anion Gap 6 <=19 mmol/L   Magnesium   Result Value Ref Range    Magnesium 2.00 1.60 - 3.10 mg/dL   Phosphorus   Result Value Ref Range    Phosphorus 2.9 2.5 - 4.5 mg/dL   Manual Differential   Result Value Ref Range    Neutrophils %, Manual 74.0 40.0 - 80.0 %    Bands %, Manual 1.0 0.0 - 5.0 %    Lymphocytes %, Manual 12.0 13.0 - 44.0 %    Monocytes %, Manual 7.0 2.0 - 10.0 %    Eosinophils %, Manual 1.0 0.0 - 6.0 %    Basophils %, Manual 0.0 0.0 - 2.0 %    Metamyelocytes %, Manual 4.0 0.0 - 0.0 %    Myelocytes %, Manual 1.0 0.0 - 0.0 %    Seg Neutrophils Absolute, Manual 10.29 (H) 1.20 - 7.00 x10*3/uL    Bands Absolute, Manual 0.14 0.00 - 0.70 x10*3/uL    Lymphocytes Absolute, Manual 1.67 1.20 -  4.80 x10*3/uL    Monocytes Absolute, Manual 0.97 0.10 - 1.00 x10*3/uL    Eosinophils Absolute, Manual 0.14 0.00 - 0.70 x10*3/uL    Basophils Absolute, Manual 0.00 0.00 - 0.10 x10*3/uL    Metamyelocytes Absolute, Manual 0.56 0.00 - 0.00 x10*3/uL    Myelocytes Absolute, Manual 0.14 0.00 - 0.00 x10*3/uL    Total Cells Counted 100     Neutrophils Absolute, Manual 10.43 (H) 1.20 - 7.70 x10*3/uL    RBC Morphology See Below     Polychromasia Mild     Hypochromia Mild     RBC Fragments Few     Ovalocytes Few     Clumped Platelets Present    POCT GLUCOSE   Result Value Ref Range    POCT Glucose 96 74 - 99 mg/dL       Physical Exam  Constitutional:       General: She is awake.      Appearance: She is awake, alert  HENT:      Head: Normocephalic and atraumatic.      Right Ear: External ear normal.      Left Ear: External ear normal.      Nose: Nose normal.   Eyes:      General: No scleral icterus.     Extraocular Movements: Extraocular movements intact.   Cardiovascular:      Rate and Rhythm: Tachycardia present.      Heart sounds: Normal heart sounds, S1 normal and S2 normal.   Pulmonary:      Breath sounds: Decreased breath sounds present.   Abdominal:      General: Bowel sounds are normal.      Palpations: Abdomen is soft.   Musculoskeletal:      Cervical back: Normal range of motion and neck supple.      Right lower leg: No edema.      Left lower leg: No edema.   Skin:     General: Skin is warm and dry.   Neurological:      Mental Status: She is alert.   Psychiatric:         Behavior: Behavior normal. Behavior is cooperative.     Susceptibility data from last 90 days.  Collected Specimen Info Organism Amoxicillin/Clavulanate Ampicillin Ampicillin/Sulbactam Cefazolin Cefazolin (uncomplicated UTIs only) Ceftriaxone Ciprofloxacin Clindamycin Erythromycin Gentamicin Nitrofurantoin Oxacillin   05/08/24 Tissue/Biopsy from Wound/Tissue Methicillin Susceptible Staphylococcus aureus (MSSA)         S  S    S     Mixed Anaerobic  Bacteria                 Mixed Gram-Positive and Gram-Negative Bacteria               05/07/24 Urine from Clean Catch/Voided Klebsiella pneumoniae/variicola (1)  S  R  S  R  S  S  R    S  R      Klebsiella pneumoniae/variicola (2)  S  R  S  S  S   I    S  R      Collected Specimen Info Organism Piperacillin/Tazobactam Tetracycline Trimethoprim/Sulfamethoxazole Vancomycin   05/08/24 Tissue/Biopsy from Wound/Tissue Methicillin Susceptible Staphylococcus aureus (MSSA)   S  S  S     Mixed Anaerobic Bacteria         Mixed Gram-Positive and Gram-Negative Bacteria       05/07/24 Urine from Clean Catch/Voided Klebsiella pneumoniae/variicola (1)  S   S      Klebsiella pneumoniae/variicola (2)  S   S        Assessment/Plan   Shock, possibly septic, off pressors  Acute hypoxic respiratory failure-differentials include lung collapse, pleural effusion, infection, on low-flow oxygen  Abnormal CT chest-pleural effusion, left-sided lung collapse, possible pneumonia  Infected PEG tube site-wound culture growing MSSA  Klebsiella urinary tract infection  Metastatic esophageal cancer        Augmentin-total of 14 days  Local care of PEG tube site  Oxygen as needed  Supportive care  Monitor temperature and WBC      Total time spent caring for the patient today was 25 minutes.  This includes time spent before the visit reviewing the chart, time spent during the visit, and time spent after the visit on documentation.

## 2024-05-14 NOTE — PROGRESS NOTES
Debbi Segura is a 63 y.o. female on day 7 of admission presenting with Sepsis, due to unspecified organism, unspecified whether acute organ dysfunction present (Multi).      Subjective   Doing okay, still refusing hospice.        Objective     Last Recorded Vitals  BP 83/58 (BP Location: Left arm, Patient Position: Lying)   Pulse (!) 111   Temp 36.5 °C (97.7 °F) (Temporal)   Resp 18   Wt 51 kg (112 lb 7 oz)   SpO2 99%   Intake/Output last 3 Shifts:    Intake/Output Summary (Last 24 hours) at 5/14/2024 1339  Last data filed at 5/13/2024 1900  Gross per 24 hour   Intake 1100 ml   Output 700 ml   Net 400 ml       Admission Weight  Weight: 56.6 kg (124 lb 12.5 oz) (05/07/24 0159)    Daily Weight  05/14/24 : 51 kg (112 lb 7 oz)    Image Results  XR chest 1 view  Narrative: Interpreted By:  Kathleen Britton,   STUDY:  XR CHEST 1 VIEW 5/13/2024 11:18 am      INDICATION:  Signs/Symptoms:pneumonia      COMPARISON:  05/09/2024      ACCESSION NUMBER(S):  [id number]      ORDERING CLINICIAN:  FIORELLA BAÑUELOS      TECHNIQUE:  AP erect view of the chest      FINDINGS:  The cardiac size is mildly enlarged with calcified plaque in the  aortic arch. A stent is seen within the thoracic esophagus. There is  central venous access catheter terminating in the SVC.      Right lung is clear. On the left, there is continued volume loss  affecting the left upper lobe with accompanying left pleural  effusion. Left hilar mass/adenopathy is seen.      Impression: Left hilar mass/adenopathy with left upper lobe atelectasis and  accompanying left pleural effusion.      Right lung is grossly clear at this time however.      Stable positioning of esophageal stent.      Signed by: Kathleen Britton 5/13/2024 12:58 PM  Dictation workstation:   [id number]    Physical Exam  Constitutional:       Appearance: She is ill-appearing.   HENT:      Head: Normocephalic and atraumatic.      Mouth/Throat:      Mouth: Mucous membranes are dry.   Eyes:       Extraocular Movements: Extraocular movements intact.      Pupils: Pupils are equal, round, and reactive to light.   Cardiovascular:      Rate and Rhythm: Tachycardia.      Pulses: Normal pulses.      Heart sounds: Normal heart sounds.   Pulmonary:      Effort: Pulmonary effort is normal.   Abdominal:      General: Bowel sounds are normal.   Musculoskeletal:         General: Normal range of motion.      Cervical back: Normal range of motion and neck supple.   Skin:     General: Skin is warm and dry.   Neurological:      General: No focal deficit present.      Mental Status: She is oriented to person, place, and time.   Psychiatric:         Mood and Affect: Mood normal.              Assessment/Plan      Acute hypoxic resp failure -continue to wean oxygen as tolerated. Will need closely monitored at SNF.   Metastatic esophageal cancer with malignant effusions-Pall med following. Goals of care conversations ongoing. Appropriate for hospice.   Anxiety -Regimen as ordered.  Chronic pain -pain medications as ordered, c/o ongoing pain.   Hypotension-Will dc lisinopril.       DC planning for back to NH when arrangements completed. [facility].                    Russell Deal MD

## 2024-05-14 NOTE — PROGRESS NOTES
Palliative Care Progress Note    Date of Admission: 5/7/2024    Patient is a 63 y.o. female admitted with Sepsis, due to unspecified organism, unspecified whether acute organ dysfunction present (Multi). Currently on 3L NC, co increased L lung pain and GI discomfort today. Dilaudid IV assisting with pain control, but not lasting 4hrs. Dyspnea accompanies anxiety. Sister David) at bedside.       Scheduled medications  amoxicillin-pot clavulanate, 1 tablet, oral, q12h JAIDEN  aspirin, 81 mg, oral, Once  atorvastatin, 20 mg, oral, Nightly  brimonidine, 1 drop, Right Eye, BID  enoxaparin, 40 mg, subcutaneous, Daily  [START ON 5/19/2024] escitalopram, 10 mg, oral, Daily  escitalopram, 5 mg, oral, Daily  pantoprazole, 40 mg, oral, Daily   Or  esomeprazole, 40 mg, nasoduodenal tube, Daily   Or  pantoprazole, 40 mg, intravenous, Daily  fentaNYL, 1 patch, transdermal, q72h  fentaNYL, 1 patch, transdermal, q72h  gabapentin, 250 mg, oral, BID  gabapentin, 500 mg, oral, Nightly  insulin lispro, 0-5 Units, subcutaneous, q4h  ipratropium-albuteroL, 3 mL, nebulization, TID  levETIRAcetam, 750 mg, g-tube, BID  oxygen, , inhalation, Continuous - Inhalation  psyllium, 1 packet, oral, Daily  sennosides-docusate sodium, 2 tablet, g-tube, BID  sucralfate, 1 g, oral, q6h JAIDEN  thiamine, 100 mg, oral, Daily      Continuous medications     PRN medications  PRN medications: acetaminophen **OR** acetaminophen **OR** acetaminophen, artificial saliva (yerbas-lyt), dextrose, dextrose, glucagon, glucagon, hydrocodone-homatropine, HYDROmorphone, hydrOXYzine HCL, methocarbamol, ondansetron, oxyCODONE, oxygen, polyethylene glycol, simethicone     Results for orders placed or performed during the hospital encounter of 05/07/24 (from the past 24 hour(s))   POCT GLUCOSE   Result Value Ref Range    POCT Glucose 105 (H) 74 - 99 mg/dL   POCT GLUCOSE   Result Value Ref Range    POCT Glucose 120 (H) 74 - 99 mg/dL   POCT GLUCOSE   Result Value Ref Range     POCT Glucose 105 (H) 74 - 99 mg/dL   POCT GLUCOSE   Result Value Ref Range    POCT Glucose 124 (H) 74 - 99 mg/dL   CBC and Auto Differential   Result Value Ref Range    WBC 13.9 (H) 4.4 - 11.3 x10*3/uL    nRBC 0.1 (H) 0.0 - 0.0 /100 WBCs    RBC 3.95 (L) 4.00 - 5.20 x10*6/uL    Hemoglobin 9.9 (L) 12.0 - 16.0 g/dL    Hematocrit 32.4 (L) 36.0 - 46.0 %    MCV 82 80 - 100 fL    MCH 25.1 (L) 26.0 - 34.0 pg    MCHC 30.6 (L) 32.0 - 36.0 g/dL    RDW 19.8 (H) 11.5 - 14.5 %    Platelets 458 (H) 150 - 450 x10*3/uL    Immature Granulocytes %, Automated 6.4 (H) 0.0 - 0.9 %    Immature Granulocytes Absolute, Automated 0.89 (H) 0.00 - 0.70 x10*3/uL   Basic Metabolic Panel   Result Value Ref Range    Glucose 112 (H) 65 - 99 mg/dL    Sodium 129 (L) 133 - 145 mmol/L    Potassium 4.7 3.4 - 5.1 mmol/L    Chloride 97 97 - 107 mmol/L    Bicarbonate 26 24 - 31 mmol/L    Urea Nitrogen 29 (H) 8 - 25 mg/dL    Creatinine 0.80 0.40 - 1.60 mg/dL    eGFR 82 >60 mL/min/1.73m*2    Calcium 10.7 (H) 8.5 - 10.4 mg/dL    Anion Gap 6 <=19 mmol/L   Magnesium   Result Value Ref Range    Magnesium 2.00 1.60 - 3.10 mg/dL   Phosphorus   Result Value Ref Range    Phosphorus 2.9 2.5 - 4.5 mg/dL   Manual Differential   Result Value Ref Range    Neutrophils %, Manual 74.0 40.0 - 80.0 %    Bands %, Manual 1.0 0.0 - 5.0 %    Lymphocytes %, Manual 12.0 13.0 - 44.0 %    Monocytes %, Manual 7.0 2.0 - 10.0 %    Eosinophils %, Manual 1.0 0.0 - 6.0 %    Basophils %, Manual 0.0 0.0 - 2.0 %    Metamyelocytes %, Manual 4.0 0.0 - 0.0 %    Myelocytes %, Manual 1.0 0.0 - 0.0 %    Seg Neutrophils Absolute, Manual 10.29 (H) 1.20 - 7.00 x10*3/uL    Bands Absolute, Manual 0.14 0.00 - 0.70 x10*3/uL    Lymphocytes Absolute, Manual 1.67 1.20 - 4.80 x10*3/uL    Monocytes Absolute, Manual 0.97 0.10 - 1.00 x10*3/uL    Eosinophils Absolute, Manual 0.14 0.00 - 0.70 x10*3/uL    Basophils Absolute, Manual 0.00 0.00 - 0.10 x10*3/uL    Metamyelocytes Absolute, Manual 0.56 0.00 - 0.00  x10*3/uL    Myelocytes Absolute, Manual 0.14 0.00 - 0.00 x10*3/uL    Total Cells Counted 100     Neutrophils Absolute, Manual 10.43 (H) 1.20 - 7.70 x10*3/uL    RBC Morphology See Below     Polychromasia Mild     Hypochromia Mild     RBC Fragments Few     Ovalocytes Few     Clumped Platelets Present    POCT GLUCOSE   Result Value Ref Range    POCT Glucose 96 74 - 99 mg/dL   POCT GLUCOSE   Result Value Ref Range    POCT Glucose 126 (H) 74 - 99 mg/dL        XR chest 1 view  Result Date: 5/9/2024  Interpreted By:  Russell Rucker, STUDY: XR CHEST 1 VIEW;  5/9/2024 8:37 am   INDICATION: Signs/Symptoms:hypoxia, eval pleural effusions.   COMPARISON: Most recent prior is from 05/07/2024. CT scan from 05/07/2024.   ACCESSION NUMBER(S): [id number]   ORDERING CLINICIAN: JESI LEON   TECHNIQUE: Single AP portable view of the chest was obtained.   FINDINGS: MEDIASTINUM/ LUNGS/ REGINE: Stable esophageal stent. Stable right-sided central venous MediPort. Progression of small right pleural effusion. Stable cardiomegaly. There are diffuse infiltrative opacities throughout the right lung. Stable collapse of the left upper lobe with hyper aeration of the left lower lobe. Persistent mild haziness at the left lung base. No blunting of the left lateral costophrenic sulcus.   BONES: No lytic or blastic destructive bone lesion.   UPPER ABDOMEN: Grossly intact.       Cardiomegaly.   Small right pleural effusion, mildly progressed.   Grossly stable infiltrative densities throughout the right lung.   Stable collapse of the left upper lobe with stable hyper aeration of the left lung. Mild stable infiltrate/atelectasis at the left lung base.   Stable esophageal stent. Stable right-sided central venous MediPort.   MACRO: None   Signed by: Russell Rucker 5/9/2024 10:37 AM Dictation workstation:   [id number]    XR chest 1 view  Result Date: 5/8/2024  FINDINGS: There is worsening consolidation within the right upper lobe, right middle lobe.  Similar consolidation in the right lower lobe. There is redemonstration of a prominent left basilar opacity representing sub pulmonic effusion. There is redemonstration of complete left upper lobe collapse and left hilar mass representing locally invasive esophageal cancer and malignant lymphadenopathy. No pneumothorax.       Please see above.     MACRO: None.   Signed by: Russell Escalante 5/8/2024 1:28 AM Dictation workstation:   [id number]    CT chest w IV contrast  Result Date: 5/7/2024  FINDINGS: LOWER NECK AND CHEST WALL:  Right chest port catheter.   MEDIASTINUM/REGINE:No lymphadenopathy. Metallic esophageal stent in place. Trace debris within the stent lumen. Confluent soft tissue density within the posterior paratracheal and paraesophageal mediastinum may reflect confluence of adenopathy or locally advanced malignancy.   CARDIOVASCULAR:  Cardiac chamber size within normal limits. Small pericardial effusion. Right chest port catheter tip terminating at the SVC/RA junction. Aortic caliber normal. Normal caliber of main pulmonary artery. Scattered coronary atherosclerotic calcification.   LUNGS, AIRWAYS, AND PLEURA:  Severe focal narrowing of the left mainstem bronchus. There is complete occlusion/filling of the majority of the left lower lobar and left lower lobe segmental bronchi. Moderate bilateral pleural effusions. Moderate emphysema. Patchy ground-glass opacities throughout the right lung and dependent right lower lobe consolidation may reflect an infectious/inflammatory process. Somewhat nodular interlobular septal thickening at the medial right lung base may reflect lymphangitic carcinomatosis. There are spiculated pulmonary nodules within the left upper lobe measuring 2.1 cm, right upper lobe measuring 1.3 cm, and posteroinferior right lower lobe measuring 1.5 cm as well as the superior segment of the right lower lobe measuring 1.1 cm. There is complete collapse of the left upper lobe and complete  filling of the left upper lobar bronchi.   MUSCULOSKELETAL: No acute osseous abnormality or suspicious osseous lesions. Mild multilevel spinal degenerative change.   UPPER ABDOMEN: Unremarkable.       1. Esophageal stent in place with soft tissue attenuation material in the posterior mediastinum surrounding the esophagus and central airways, likely reflecting known locally advanced esophageal squamous cell carcinoma with possible additional superimposed confluent adenopathy. There is complete narrowing/filling of the left upper lobe airway with collapse/consolidation of the left upper lobe. There is partial filling/narrowing of the left lower lobe airway with extensive material filling the central bronchi of the left lower lobe. Extensive patchy ground-glass/consolidative opacities within the right lung concerning for an infectious/inflammatory process. 2. Moderate bilateral pleural effusions. 3. Spiculated bilateral pulmonary nodules, likely metastatic. 4. Small pericardial effusion. 5. Somewhat nodular interlobular septal thickening at the medial right lung base may reflect lymphangitic carcinomatosis.   Signed by: Chaitanya Lovell 5/7/2024 5:02 AM Dictation workstation:   [id number]    XR chest 1 view  Result Date: 5/7/2024  Interpreted By:  Russell Escalante, STUDY: XR CHEST 1 VIEW;  5/7/2024 2:29 am   INDICATION: Signs/Symptoms:Chest Pain.   COMPARISON: 04/06/2017   ACCESSION NUMBER(S): [id number]   ORDERING CLINICIAN: JENN NEWMAN   FINDINGS: There is an esophageal stent. There is a right chest port terminating over the cavoatrial junction.   There is a large left pleural effusion. There is new bilateral hilar enlargement and nodularity likely representing lymphadenopathy. There is left hemithorax volume loss. There is airspace disease at the left lung base. There is diffuse peribronchovascular and interstitial prominence. No pneumothorax.       Multiple cardiopulmonary abnormalities including  probable pulmonary edema, large left pleural effusion, bilateral hilar and mediastinal lymphadenopathy and probable airway obstruction on the left resulting in volume loss of the left hemithorax. CT chest with contrast is recommended for further evaluation.   Esophageal stent consistent with esophageal cancer.   MACRO: None.   Signed by: Russell Escalante 5/7/2024 2:37 AM Dictation workstation:   [id number]       Vitals:    05/14/24 1412   BP: 93/59   Pulse: 108   Resp:    Temp:    SpO2:        Physical Exam  Vitals and nursing note reviewed.   Constitutional:       General: She is in acute distress.      Appearance: She is ill-appearing.   HENT:      Head: Normocephalic and atraumatic.      Mouth/Throat:      Mouth: Mucous membranes are dry.      Pharynx: Oropharynx is clear.   Eyes:      General: No scleral icterus.     Extraocular Movements: Extraocular movements intact.   Neck:      Comments: Symmetrical neck; cervical rotation decreased  Cardiovascular:      Rate and Rhythm: Regular rhythm. Tachycardia present.      Pulses: Normal pulses.      Heart sounds: Normal heart sounds.   Pulmonary:      Effort: No respiratory distress.      Breath sounds: No wheezing or rales.      Comments: Lungs are clear but diminished, noted conversational dyspnea, now on 3 liters NC  Abdominal:      General: Abdomen is flat. There is no distension.      Palpations: Abdomen is soft.      Tenderness: There is abdominal tenderness. There is no guarding.   Musculoskeletal:      Right lower leg: No edema.      Left lower leg: No edema.   Skin:     General: Skin is warm and dry.      Findings: No rash.   Neurological:      General: No focal deficit present.      Mental Status: She is alert and oriented to person, place, and time.   Psychiatric:         Mood and Affect: Mood normal.         Behavior: Behavior normal.         Thought Content: Thought content normal.         Judgment: Judgment normal.         Assessment/Plan   Sepsis, due  to unspecified organism, unspecified whether acute organ dysfunction present (Multi)   IMP:    Acute hypoxic respiratory failure - 2/2 large left pleural effusion. Bilat lung nodules noted on chest CT likely metastatic disease. Pulm consulted recommending thoracentesis.   Metastatic esophageal cancer - mets to brain and now lungs. Prognosis poor  Anxiety disorder - chronic, on Vistaril outpatient, add citalopram (would favor duloxetine but cannot give via peg)  Brain metastatic disease on Keppra  Chronic pain - generalized resume home regimen with fentanyl Duragesic patch, nhi, prn tylenol and prn methocarbamol      Palliative Care  DNRCCA/DNI  Capable  Son Rio is only child, stated POA but we do not have this documentation. Regardless he is legal surrogate if needed.  Patient and son met with Hospice a few weeks ago when brain mets were identified. She was not ready to shift goals of care and wanted to proceed with radiation therapy at that time.      Main issue with patient has been achieving adequate control of pain and anxiety which has been limiting ability to engage in GOC discussion with the patient and son. Dilaudid frequency increased to q4 prn yesterday. This seems to be helping. She would also benefit from a basal med to control her anxiety as this is ongoing problem and not new. Would favor duloxetine to help with mood and pain control, but cannot give DR capsule via peg. Will start on low dose of Celexa and titrate up as michelle. Will check ECG.    5/13  No acute events overnight. Attempted to readdress goals of care with patient this morning, as she asked my why she was going back to [facility]. I reviewed options of hospice vs LTC, however patient started to experience nausea, reflux during our discussion and discussion was placed on hold due to discomfort. I discussed case with attending service. Would recommend at least navigator involvement at this time given uncontrolled symptoms. Will need to readdress  with patient once nausea resolved.     5/14  Met with patient, sister Marianela in person, sister Taylor present via speakerphone. Patient goal is to be safe and have better symptom control. Continues to verbalize worry about her son as well. Patient worried about the care she will receive if she returns to [facility], stating she feels unsafe there due to poor care, uncontrolled symptoms. Sisters very aware of poor prognosis and supportive of hospice for additional support for patient. They both prioritize the care of the patient and her comfort. We discussed hospice services, possibly transfer to Hospice house for symptom control, however patient's spouse passed at [facility] through [facility] and she does not want to go there. We reviewed nearby hospice houses, and reached out to [facility]) [phone]. I spoke to admissions coordinator Ole and Dr Deal. Referral was placed. We discussed that patient could transfer to their hospice house, and if symptoms were controlled, she could then transition to LTC within the same building. Sisters and patient were agreeable to evaluation by hospice. Adelita sent referral to [facility] hospice via careport.   Likely will be evaluated by hospice tomorrow.     Regarding pain control, trial patient back on oxycodone elixer with dialaudid prn second line severe pain. Adde carafate for suspected gastritis.     Total time with patient 90min.   Alicia Whitehead, APRN-CNP

## 2024-05-14 NOTE — PROGRESS NOTES
05/14/24 1403   Discharge Planning   Patient expects to be discharged to: Referral sent to [facility]     Patient likely to transfer to their inpatient unit RN will be here tomorrow to sign consents with patient

## 2024-05-14 NOTE — NURSING NOTE
Pt tolerating tube feeds well today, c.o mild discomfort this AM with med pass that resolved on own. Pt states pain remained the same this shift. Denies needs at this time. Care continues.

## 2024-05-15 VITALS
HEART RATE: 108 BPM | RESPIRATION RATE: 16 BRPM | WEIGHT: 115.3 LBS | BODY MASS INDEX: 18.53 KG/M2 | OXYGEN SATURATION: 93 % | SYSTOLIC BLOOD PRESSURE: 74 MMHG | TEMPERATURE: 97.3 F | HEIGHT: 66 IN | DIASTOLIC BLOOD PRESSURE: 59 MMHG

## 2024-05-15 LAB
ANION GAP SERPL CALC-SCNC: 10 MMOL/L
BASOPHILS # BLD MANUAL: 0 X10*3/UL (ref 0–0.1)
BASOPHILS NFR BLD MANUAL: 0 %
BUN SERPL-MCNC: 31 MG/DL (ref 8–25)
CALCIUM SERPL-MCNC: 10.6 MG/DL (ref 8.5–10.4)
CHLORIDE SERPL-SCNC: 98 MMOL/L (ref 97–107)
CO2 SERPL-SCNC: 23 MMOL/L (ref 24–31)
CREAT SERPL-MCNC: 0.8 MG/DL (ref 0.4–1.6)
EGFRCR SERPLBLD CKD-EPI 2021: 82 ML/MIN/1.73M*2
EOSINOPHIL # BLD MANUAL: 0.6 X10*3/UL (ref 0–0.7)
EOSINOPHIL NFR BLD MANUAL: 4 %
ERYTHROCYTE [DISTWIDTH] IN BLOOD BY AUTOMATED COUNT: 19.6 % (ref 11.5–14.5)
GLUCOSE BLD MANUAL STRIP-MCNC: 111 MG/DL (ref 74–99)
GLUCOSE BLD MANUAL STRIP-MCNC: 111 MG/DL (ref 74–99)
GLUCOSE BLD MANUAL STRIP-MCNC: 116 MG/DL (ref 74–99)
GLUCOSE BLD MANUAL STRIP-MCNC: 125 MG/DL (ref 74–99)
GLUCOSE SERPL-MCNC: 118 MG/DL (ref 65–99)
HCT VFR BLD AUTO: 32.4 % (ref 36–46)
HGB BLD-MCNC: 10.1 G/DL (ref 12–16)
IMM GRANULOCYTES # BLD AUTO: 0.91 X10*3/UL (ref 0–0.7)
IMM GRANULOCYTES NFR BLD AUTO: 6 % (ref 0–0.9)
LYMPHOCYTES # BLD MANUAL: 1.36 X10*3/UL (ref 1.2–4.8)
LYMPHOCYTES NFR BLD MANUAL: 9 %
MAGNESIUM SERPL-MCNC: 1.9 MG/DL (ref 1.6–3.1)
MCH RBC QN AUTO: 25.4 PG (ref 26–34)
MCHC RBC AUTO-ENTMCNC: 31.2 G/DL (ref 32–36)
MCV RBC AUTO: 82 FL (ref 80–100)
METAMYELOCYTES # BLD MANUAL: 0.15 X10*3/UL
METAMYELOCYTES NFR BLD MANUAL: 1 %
MONOCYTES # BLD MANUAL: 0.6 X10*3/UL (ref 0.1–1)
MONOCYTES NFR BLD MANUAL: 4 %
MYELOCYTES # BLD MANUAL: 0.3 X10*3/UL
MYELOCYTES NFR BLD MANUAL: 2 %
NEUTROPHILS # BLD MANUAL: 12.08 X10*3/UL (ref 1.2–7.7)
NEUTS BAND # BLD MANUAL: 0.15 X10*3/UL (ref 0–0.7)
NEUTS BAND NFR BLD MANUAL: 1 %
NEUTS SEG # BLD MANUAL: 11.93 X10*3/UL (ref 1.2–7)
NEUTS SEG NFR BLD MANUAL: 79 %
NRBC BLD-RTO: 0 /100 WBCS (ref 0–0)
OVALOCYTES BLD QL SMEAR: ABNORMAL
PHOSPHATE SERPL-MCNC: 2.5 MG/DL (ref 2.5–4.5)
PLATELET # BLD AUTO: 445 X10*3/UL (ref 150–450)
POTASSIUM SERPL-SCNC: 4.7 MMOL/L (ref 3.4–5.1)
RBC # BLD AUTO: 3.97 X10*6/UL (ref 4–5.2)
RBC MORPH BLD: ABNORMAL
SODIUM SERPL-SCNC: 131 MMOL/L (ref 133–145)
TOTAL CELLS COUNTED BLD: 100
WBC # BLD AUTO: 15.1 X10*3/UL (ref 4.4–11.3)

## 2024-05-15 PROCEDURE — 2500000005 HC RX 250 GENERAL PHARMACY W/O HCPCS: Performed by: INTERNAL MEDICINE

## 2024-05-15 PROCEDURE — 9420000001 HC RT PATIENT EDUCATION 5 MIN

## 2024-05-15 PROCEDURE — 2500000001 HC RX 250 WO HCPCS SELF ADMINISTERED DRUGS (ALT 637 FOR MEDICARE OP)

## 2024-05-15 PROCEDURE — 80048 BASIC METABOLIC PNL TOTAL CA: CPT

## 2024-05-15 PROCEDURE — 99497 ADVNCD CARE PLAN 30 MIN: CPT | Performed by: NURSE PRACTITIONER

## 2024-05-15 PROCEDURE — 82947 ASSAY GLUCOSE BLOOD QUANT: CPT

## 2024-05-15 PROCEDURE — 99233 SBSQ HOSP IP/OBS HIGH 50: CPT | Performed by: NURSE PRACTITIONER

## 2024-05-15 PROCEDURE — 2500000001 HC RX 250 WO HCPCS SELF ADMINISTERED DRUGS (ALT 637 FOR MEDICARE OP): Performed by: INTERNAL MEDICINE

## 2024-05-15 PROCEDURE — 2500000004 HC RX 250 GENERAL PHARMACY W/ HCPCS (ALT 636 FOR OP/ED): Performed by: NURSE PRACTITIONER

## 2024-05-15 PROCEDURE — 2500000004 HC RX 250 GENERAL PHARMACY W/ HCPCS (ALT 636 FOR OP/ED)

## 2024-05-15 PROCEDURE — 2500000006 HC RX 250 W HCPCS SELF ADMINISTERED DRUGS (ALT 637 FOR ALL PAYERS): Performed by: NURSE PRACTITIONER

## 2024-05-15 PROCEDURE — 83735 ASSAY OF MAGNESIUM: CPT

## 2024-05-15 PROCEDURE — 2500000001 HC RX 250 WO HCPCS SELF ADMINISTERED DRUGS (ALT 637 FOR MEDICARE OP): Performed by: NURSE PRACTITIONER

## 2024-05-15 PROCEDURE — 85027 COMPLETE CBC AUTOMATED: CPT

## 2024-05-15 PROCEDURE — 84100 ASSAY OF PHOSPHORUS: CPT

## 2024-05-15 PROCEDURE — 85007 BL SMEAR W/DIFF WBC COUNT: CPT

## 2024-05-15 RX ADMIN — BRIMONIDINE TARTRATE 1 DROP: 2 SOLUTION/ DROPS OPHTHALMIC at 09:48

## 2024-05-15 RX ADMIN — GABAPENTIN 250 MG: 250 SOLUTION ORAL at 09:49

## 2024-05-15 RX ADMIN — SUCRALFATE 1 G: 1 SUSPENSION ORAL at 06:15

## 2024-05-15 RX ADMIN — HYDROCODONE BITARTRATE AND HOMATROPINE METHYLBROMIDE 5 ML: 5; 1.5 SOLUTION ORAL at 09:49

## 2024-05-15 RX ADMIN — SUCRALFATE 1 G: 1 SUSPENSION ORAL at 12:12

## 2024-05-15 RX ADMIN — LEVETIRACETAM 750 MG: 100 SOLUTION ORAL at 09:48

## 2024-05-15 RX ADMIN — Medication 3 L/MIN: at 08:00

## 2024-05-15 RX ADMIN — ESCITALOPRAM OXALATE 5 MG: 10 TABLET ORAL at 09:48

## 2024-05-15 RX ADMIN — Medication 100 MG: at 09:48

## 2024-05-15 RX ADMIN — HYDROMORPHONE HYDROCHLORIDE 1 MG: 1 INJECTION, SOLUTION INTRAMUSCULAR; INTRAVENOUS; SUBCUTANEOUS at 09:44

## 2024-05-15 RX ADMIN — ESOMEPRAZOLE MAGNESIUM 40 MG: 40 GRANULE, DELAYED RELEASE ORAL at 09:48

## 2024-05-15 RX ADMIN — ENOXAPARIN SODIUM 40 MG: 40 INJECTION SUBCUTANEOUS at 09:48

## 2024-05-15 RX ADMIN — HYDROMORPHONE HYDROCHLORIDE 1 MG: 1 INJECTION, SOLUTION INTRAMUSCULAR; INTRAVENOUS; SUBCUTANEOUS at 06:43

## 2024-05-15 RX ADMIN — AMOXICILLIN AND CLAVULANATE POTASSIUM 1 TABLET: 875; 125 TABLET, FILM COATED ORAL at 09:48

## 2024-05-15 RX ADMIN — PSYLLIUM HUSK 1 PACKET: 3.4 POWDER ORAL at 09:48

## 2024-05-15 RX ADMIN — HYDROMORPHONE HYDROCHLORIDE 0.5 MG: 1 INJECTION, SOLUTION INTRAMUSCULAR; INTRAVENOUS; SUBCUTANEOUS at 12:10

## 2024-05-15 ASSESSMENT — COGNITIVE AND FUNCTIONAL STATUS - GENERAL
EATING MEALS: TOTAL
STANDING UP FROM CHAIR USING ARMS: A LOT
PERSONAL GROOMING: TOTAL
DAILY ACTIVITIY SCORE: 6
HELP NEEDED FOR BATHING: TOTAL
MOVING TO AND FROM BED TO CHAIR: A LOT
CLIMB 3 TO 5 STEPS WITH RAILING: TOTAL
MOVING FROM LYING ON BACK TO SITTING ON SIDE OF FLAT BED WITH BEDRAILS: A LITTLE
DRESSING REGULAR LOWER BODY CLOTHING: TOTAL
DRESSING REGULAR UPPER BODY CLOTHING: TOTAL
TURNING FROM BACK TO SIDE WHILE IN FLAT BAD: A LITTLE
TOILETING: TOTAL
WALKING IN HOSPITAL ROOM: TOTAL
MOBILITY SCORE: 12

## 2024-05-15 ASSESSMENT — PAIN - FUNCTIONAL ASSESSMENT
PAIN_FUNCTIONAL_ASSESSMENT: 0-10

## 2024-05-15 ASSESSMENT — PAIN DESCRIPTION - ORIENTATION: ORIENTATION: RIGHT

## 2024-05-15 ASSESSMENT — PAIN SCALES - GENERAL
PAINLEVEL_OUTOF10: 9
PAINLEVEL_OUTOF10: 0 - NO PAIN
PAINLEVEL_OUTOF10: 9

## 2024-05-15 ASSESSMENT — PAIN DESCRIPTION - LOCATION: LOCATION: SHOULDER

## 2024-05-15 NOTE — PROGRESS NOTES
Palliative Care Progress Note    Date of Admission: 5/7/2024    Patient is a 63 y.o. female admitted with Sepsis, due to unspecified organism, unspecified whether acute organ dysfunction present (Multi). Currently on 3L NC, co increased L lung pain and GI discomfort today. [facility] at bedside.       Scheduled medications  amoxicillin-pot clavulanate, 1 tablet, oral, q12h JAIDEN  aspirin, 81 mg, oral, Once  brimonidine, 1 drop, Right Eye, BID  enoxaparin, 40 mg, subcutaneous, Daily  [START ON 5/19/2024] escitalopram, 10 mg, oral, Daily  escitalopram, 5 mg, oral, Daily  pantoprazole, 40 mg, oral, Daily   Or  esomeprazole, 40 mg, nasoduodenal tube, Daily   Or  pantoprazole, 40 mg, intravenous, Daily  fentaNYL, 1 patch, transdermal, q72h  fentaNYL, 1 patch, transdermal, q72h  gabapentin, 250 mg, oral, BID  gabapentin, 500 mg, oral, Nightly  HYDROmorphone, 0.5 mg, intravenous, Once  insulin lispro, 0-5 Units, subcutaneous, q4h  ipratropium-albuteroL, 3 mL, nebulization, TID  levETIRAcetam, 750 mg, g-tube, BID  oxygen, , inhalation, Continuous - Inhalation  sucralfate, 1 g, oral, q6h [facility]ly [facility]      Continuous medications     PRN medications  PRN medications: acetaminophen **OR** acetaminophen **OR** acetaminophen, artificial saliva (yerbas-lyt), dextrose, dextrose, glucagon, glucagon, hydrocodone-homatropine, HYDROmorphone, hydrOXYzine HCL, methocarbamol, ondansetron, oxyCODONE, oxygen, polyethylene glycol, simethicone     Results for orders placed or performed during the hospital encounter of 05/07/24 (from the past 24 hour(s))   POCT GLUCOSE   Result Value Ref Range    POCT Glucose 126 (H) 74 - 99 mg/dL   POCT GLUCOSE   Result Value Ref Range    POCT Glucose 94 74 - 99 mg/dL   POCT GLUCOSE   Result Value Ref Range    POCT Glucose 102 (H) 74 - 99 mg/dL   POCT GLUCOSE   Result Value Ref Range    POCT Glucose 111 (H) 74 - 99 mg/dL   CBC and Auto Differential   Result Value Ref Range    WBC 15.1 (H) 4.4 - 11.3 x10*3/uL     nRBC 0.0 0.0 - 0.0 /100 WBCs    RBC 3.97 (L) 4.00 - 5.20 x10*6/uL    Hemoglobin 10.1 (L) 12.0 - 16.0 g/dL    Hematocrit 32.4 (L) 36.0 - 46.0 %    MCV 82 80 - 100 fL    MCH 25.4 (L) 26.0 - 34.0 pg    MCHC 31.2 (L) 32.0 - 36.0 g/dL    RDW 19.6 (H) 11.5 - 14.5 %    Platelets 445 150 - 450 x10*3/uL    Immature Granulocytes %, Automated 6.0 (H) 0.0 - 0.9 %    Immature Granulocytes Absolute, Automated 0.91 (H) 0.00 - 0.70 x10*3/uL   Basic Metabolic Panel   Result Value Ref Range    Glucose 118 (H) 65 - 99 mg/dL    Sodium 131 (L) 133 - 145 mmol/L    Potassium 4.7 3.4 - 5.1 mmol/L    Chloride 98 97 - 107 mmol/L    Bicarbonate 23 (L) 24 - 31 mmol/L    Urea Nitrogen 31 (H) 8 - 25 mg/dL    Creatinine 0.80 0.40 - 1.60 mg/dL    eGFR 82 >60 mL/min/1.73m*2    Calcium 10.6 (H) 8.5 - 10.4 mg/dL    Anion Gap 10 <=19 mmol/L   Magnesium   Result Value Ref Range    Magnesium 1.90 1.60 - 3.10 mg/dL   Phosphorus   Result Value Ref Range    Phosphorus 2.5 2.5 - 4.5 mg/dL   Manual Differential   Result Value Ref Range    Neutrophils %, Manual 79.0 40.0 - 80.0 %    Bands %, Manual 1.0 0.0 - 5.0 %    Lymphocytes %, Manual 9.0 13.0 - 44.0 %    Monocytes %, Manual 4.0 2.0 - 10.0 %    Eosinophils %, Manual 4.0 0.0 - 6.0 %    Basophils %, Manual 0.0 0.0 - 2.0 %    Metamyelocytes %, Manual 1.0 0.0 - 0.0 %    Myelocytes %, Manual 2.0 0.0 - 0.0 %    Seg Neutrophils Absolute, Manual 11.93 (H) 1.20 - 7.00 x10*3/uL    Bands Absolute, Manual 0.15 0.00 - 0.70 x10*3/uL    Lymphocytes Absolute, Manual 1.36 1.20 - 4.80 x10*3/uL    Monocytes Absolute, Manual 0.60 0.10 - 1.00 x10*3/uL    Eosinophils Absolute, Manual 0.60 0.00 - 0.70 x10*3/uL    Basophils Absolute, Manual 0.00 0.00 - 0.10 x10*3/uL    Metamyelocytes Absolute, Manual 0.15 0.00 - 0.00 x10*3/uL    Myelocytes Absolute, Manual 0.30 0.00 - 0.00 x10*3/uL    Total Cells Counted 100     Neutrophils Absolute, Manual 12.08 (H) 1.20 - 7.70 x10*3/uL    RBC Morphology See Below     Ovalocytes Few    POCT  GLUCOSE   Result Value Ref Range    POCT Glucose 125 (H) 74 - 99 mg/dL   POCT GLUCOSE   Result Value Ref Range    POCT Glucose 111 (H) 74 - 99 mg/dL        XR chest 1 view  Result Date: 5/9/2024  Interpreted By:  Russell Rucker, STUDY: XR CHEST 1 VIEW;  5/9/2024 8:37 am   INDICATION: Signs/Symptoms:hypoxia, eval pleural effusions.   COMPARISON: Most recent prior is from 05/07/2024. CT scan from 05/07/2024.   ACCESSION NUMBER(S): [id number]   ORDERING CLINICIAN: JESI LEON   TECHNIQUE: Single AP portable view of the chest was obtained.   FINDINGS: MEDIASTINUM/ LUNGS/ REGINE: Stable esophageal stent. Stable right-sided central venous MediPort. Progression of small right pleural effusion. Stable cardiomegaly. There are diffuse infiltrative opacities throughout the right lung. Stable collapse of the left upper lobe with hyper aeration of the left lower lobe. Persistent mild haziness at the left lung base. No blunting of the left lateral costophrenic sulcus.   BONES: No lytic or blastic destructive bone lesion.   UPPER ABDOMEN: Grossly intact.       Cardiomegaly.   Small right pleural effusion, mildly progressed.   Grossly stable infiltrative densities throughout the right lung.   Stable collapse of the left upper lobe with stable hyper aeration of the left lung. Mild stable infiltrate/atelectasis at the left lung base.   Stable esophageal stent. Stable right-sided central venous MediPort.   MACRO: None   Signed by: Russell Rucker 5/9/2024 10:37 AM Dictation workstation:   [id number]    XR chest 1 view  Result Date: 5/8/2024  FINDINGS: There is worsening consolidation within the right upper lobe, right middle lobe. Similar consolidation in the right lower lobe. There is redemonstration of a prominent left basilar opacity representing sub pulmonic effusion. There is redemonstration of complete left upper lobe collapse and left hilar mass representing locally invasive esophageal cancer and malignant lymphadenopathy. No  pneumothorax.       Please see above.     MACRO: None.   Signed by: Russell Escalante 5/8/2024 1:28 AM Dictation workstation:   [id number]    CT chest w IV contrast  Result Date: 5/7/2024  FINDINGS: LOWER NECK AND CHEST WALL:  Right chest port catheter.   MEDIASTINUM/REGINE:No lymphadenopathy. Metallic esophageal stent in place. Trace debris within the stent lumen. Confluent soft tissue density within the posterior paratracheal and paraesophageal mediastinum may reflect confluence of adenopathy or locally advanced malignancy.   CARDIOVASCULAR:  Cardiac chamber size within normal limits. Small pericardial effusion. Right chest port catheter tip terminating at the SVC/RA junction. Aortic caliber normal. Normal caliber of main pulmonary artery. Scattered coronary atherosclerotic calcification.   LUNGS, AIRWAYS, AND PLEURA:  Severe focal narrowing of the left mainstem bronchus. There is complete occlusion/filling of the majority of the left lower lobar and left lower lobe segmental bronchi. Moderate bilateral pleural effusions. Moderate emphysema. Patchy ground-glass opacities throughout the right lung and dependent right lower lobe consolidation may reflect an infectious/inflammatory process. Somewhat nodular interlobular septal thickening at the medial right lung base may reflect lymphangitic carcinomatosis. There are spiculated pulmonary nodules within the left upper lobe measuring 2.1 cm, right upper lobe measuring 1.3 cm, and posteroinferior right lower lobe measuring 1.5 cm as well as the superior segment of the right lower lobe measuring 1.1 cm. There is complete collapse of the left upper lobe and complete filling of the left upper lobar bronchi.   MUSCULOSKELETAL: No acute osseous abnormality or suspicious osseous lesions. Mild multilevel spinal degenerative change.   UPPER ABDOMEN: Unremarkable.       1. Esophageal stent in place with soft tissue attenuation material in the posterior mediastinum surrounding the  esophagus and central airways, likely reflecting known locally advanced esophageal squamous cell carcinoma with possible additional superimposed confluent adenopathy. There is complete narrowing/filling of the left upper lobe airway with collapse/consolidation of the left upper lobe. There is partial filling/narrowing of the left lower lobe airway with extensive material filling the central bronchi of the left lower lobe. Extensive patchy ground-glass/consolidative opacities within the right lung concerning for an infectious/inflammatory process. 2. Moderate bilateral pleural effusions. 3. Spiculated bilateral pulmonary nodules, likely metastatic. 4. Small pericardial effusion. 5. Somewhat nodular interlobular septal thickening at the medial right lung base may reflect lymphangitic carcinomatosis.   Signed by: Chaitanya Lovell 5/7/2024 5:02 AM Dictation workstation:   [id number]    XR chest 1 view  Result Date: 5/7/2024  Interpreted By:  Russell Escalante, STUDY: XR CHEST 1 VIEW;  5/7/2024 2:29 am   INDICATION: Signs/Symptoms:Chest Pain.   COMPARISON: 04/06/2017   ACCESSION NUMBER(S): [id number]   ORDERING CLINICIAN: JENN NEWMAN   FINDINGS: There is an esophageal stent. There is a right chest port terminating over the cavoatrial junction.   There is a large left pleural effusion. There is new bilateral hilar enlargement and nodularity likely representing lymphadenopathy. There is left hemithorax volume loss. There is airspace disease at the left lung base. There is diffuse peribronchovascular and interstitial prominence. No pneumothorax.       Multiple cardiopulmonary abnormalities including probable pulmonary edema, large left pleural effusion, bilateral hilar and mediastinal lymphadenopathy and probable airway obstruction on the left resulting in volume loss of the left hemithorax. CT chest with contrast is recommended for further evaluation.   Esophageal stent consistent with esophageal cancer.    MACRO: None.   Signed by: Russell Ava 5/7/2024 2:37 AM Dictation workstation:   [id number]       Vitals:    05/15/24 0820   BP: 90/58   Pulse:    Resp: 20   Temp: 36.7 °C (98.1 °F)   SpO2: 99%       Physical Exam  Vitals and nursing note reviewed.   Constitutional:       General: She is in acute distress.      Appearance: She is ill-appearing.   HENT:      Head: Normocephalic and atraumatic.      Mouth/Throat:      Mouth: Mucous membranes are dry.      Pharynx: Oropharynx is clear.   Eyes:      General: No scleral icterus.     Extraocular Movements: Extraocular movements intact.   Neck:      Comments: Symmetrical neck; cervical rotation decreased  Cardiovascular:      Rate and Rhythm: Regular rhythm. Tachycardia present.      Pulses: Normal pulses.      Heart sounds: Normal heart sounds.   Pulmonary:      Effort: No respiratory distress.      Breath sounds: No wheezing or rales.      Comments: Lungs are clear but diminished, noted conversational dyspnea, now on 3 liters NC  Abdominal:      General: Abdomen is flat. There is no distension.      Palpations: Abdomen is soft.      Tenderness: There is abdominal tenderness. There is no guarding.   Musculoskeletal:      Right lower leg: No edema.      Left lower leg: No edema.   Skin:     General: Skin is warm and dry.      Findings: No rash.   Neurological:      General: No focal deficit present.      Mental Status: She is alert and oriented to person, place, and time.   Psychiatric:         Mood and Affect: Mood normal.         Behavior: Behavior normal.         Thought Content: Thought content normal.         Judgment: Judgment normal.         Assessment/Plan   Sepsis, due to unspecified organism, unspecified whether acute organ dysfunction present (Multi)   IMP:    Acute hypoxic respiratory failure - 2/2 large left pleural effusion. Bilat lung nodules noted on chest CT likely metastatic disease. Pulm consulted recommending thoracentesis.   Metastatic esophageal  cancer - mets to brain and now lungs. Prognosis poor  Anxiety disorder - chronic, on Vistaril outpatient, add citalopram (would favor duloxetine but cannot give via peg)  Brain metastatic disease on Keppra  Chronic pain - generalized resume home regimen with fentanyl Duragesic patch, nhi, prn tylenol and prn methocarbamol      Palliative Care  DNRCCA/DNI  Capable  Son Rio is only child, stated POA but we do not have this documentation. Regardless he is legal surrogate if needed.  Patient and son met with Hospice a few weeks ago when brain mets were identified. She was not ready to shift goals of care and wanted to proceed with radiation therapy at that time.      Main issue with patient has been achieving adequate control of pain and anxiety which has been limiting ability to engage in GOC discussion with the patient and son. Dilaudid frequency increased to q4 prn yesterday. This seems to be helping. She would also benefit from a basal med to control her anxiety as this is ongoing problem and not new. Would favor duloxetine to help with mood and pain control, but cannot give DR capsule via peg. Will start on low dose of Celexa and titrate up as michelle. Will check ECG.    5/13  No acute events overnight. Attempted to readdress goals of care with patient this morning, as she asked my why she was going back to [facility]. I reviewed options of hospice vs LTC, however patient started to experience nausea, reflux during our discussion and discussion was placed on hold due to discomfort. I discussed case with attending service. Would recommend at least navigator involvement at this time given uncontrolled symptoms. Will need to readdress with patient once nausea resolved.     5/14  Met with patient, sister Marianela in person, sister Taylor present via speakerphone. Patient goal is to be safe and have better symptom control. Continues to verbalize worry about her son as well. Patient worried about the care she will receive if she  returns to [facility], stating she feels unsafe there due to poor care, uncontrolled symptoms. Sisters very aware of poor prognosis and supportive of hospice for additional support for patient. They both prioritize the care of the patient and her comfort. We discussed hospice services, possibly transfer to Hospice house for symptom control, however patient's spouse passed at [facility] through [facility] and she does not want to go there. We reviewed nearby hospice houses, and reached out to [facility]) [phone]. I spoke to admissions coordinator Ole and Dr Deal. Referral was placed. We discussed that patient could transfer to their hospice house, and if symptoms were controlled, she could then transition to LTC within the same building. Sisters and patient were agreeable to evaluation by hospice. Adelita sent referral to [facility] via careport.   Likely will be evaluated by hospice tomorrow.     Regarding pain control, trial patient back on oxycodone elixer with dialaudid prn second line severe pain. Adde carafate for suspected gastritis.     5/15  Uncontrolled pain, suffering. [facility] met with patient at bedside, I also participated in discussion with patient. At this point, patient just wants the pain to stop. She consented to transfer to Hospice House at [facility]. I reached out to care coordinator and attending service to update. Gave additional dose of dilaudid today x 1 for uncontrolled pain.     Total time with patient 60min.     Alicia Whitehead, APRN-CNP

## 2024-05-15 NOTE — PROGRESS NOTES
Debbi Segura is a 63 y.o. female on day 8 of admission presenting with Sepsis, due to unspecified organism, unspecified whether acute organ dysfunction present (Multi).      Subjective     Resting in bed, pain controlled.        Objective     Last Recorded Vitals  BP 90/58 (BP Location: Left arm, Patient Position: Lying)   Pulse (!) 119   Temp 36.7 °C (98.1 °F) (Oral)   Resp 20   Wt 52.3 kg (115 lb 4.8 oz)   SpO2 99%   Intake/Output last 3 Shifts:    Intake/Output Summary (Last 24 hours) at 5/15/2024 1133  Last data filed at 5/15/2024 0619  Gross per 24 hour   Intake 2620 ml   Output 1000 ml   Net 1620 ml       Admission Weight  Weight: 56.6 kg (124 lb 12.5 oz) (05/07/24 0159)    Daily Weight  05/15/24 : 52.3 kg (115 lb 4.8 oz)    Image Results  XR chest 1 view  Narrative: Interpreted By:  Kathleen Britton,   STUDY:  XR CHEST 1 VIEW 5/13/2024 11:18 am      INDICATION:  Signs/Symptoms:pneumonia      COMPARISON:  05/09/2024      ACCESSION NUMBER(S):  [id number]      ORDERING CLINICIAN:  FIORELLA BAÑUELOS      TECHNIQUE:  AP erect view of the chest      FINDINGS:  The cardiac size is mildly enlarged with calcified plaque in the  aortic arch. A stent is seen within the thoracic esophagus. There is  central venous access catheter terminating in the SVC.      Right lung is clear. On the left, there is continued volume loss  affecting the left upper lobe with accompanying left pleural  effusion. Left hilar mass/adenopathy is seen.      Impression: Left hilar mass/adenopathy with left upper lobe atelectasis and  accompanying left pleural effusion.      Right lung is grossly clear at this time however.      Stable positioning of esophageal stent.      Signed by: Kathleen Britton 5/13/2024 12:58 PM  Dictation workstation:   [id number]    Physical Exam    Constitutional:       Appearance: She is ill appearing.   HENT:      Head: Normocephalic and atraumatic.      Mouth/Throat:   Eyes:      Extraocular Movements: Extraocular  movements intact.      Pupils: Pupils are equal, round, and reactive to light.   Cardiovascular:      Rate and Rhythm: Tachycardia.      Pulses: Normal pulses.      Heart sounds: Normal heart sounds.   Pulmonary:      Effort: Pulmonary effort is normal.   Abdominal:      General: Bowel sounds are normal.   Musculoskeletal:         General: Normal range of motion.      Cervical back: Normal range of motion and neck supple.   Skin:     General: Skin is warm and dry.   Neurological:      General: No focal deficit present.      Mental Status: She is oriented to person, place, and time.   Psychiatric:         Mood and Affect: Mood normal.          Assessment/Plan      Acute hypoxic resp failure -Stable on a nasal cannula, dc planning for hospice house today.   Metastatic esophageal cancer with malignant effusions-Plan as stated above.   Anxiety -Regimen as ordered.  Chronic pain -Pain medications as ordered.   Hypotension-Monitor closely.     DC planning for hospice house later today.             RUTH Pineda-CNP

## 2024-05-15 NOTE — PROGRESS NOTES
Debbi Segura is a 63 y.o. female on day 8 of admission presenting with Sepsis, due to unspecified organism, unspecified whether acute organ dysfunction present (Multi).    Subjective   Remains afebrile  Reports pain  Plan for hospice meeting today    Objective   Range of Vitals (last 24 hours)  Heart Rate:  []   Temp:  [35.7 °C (96.3 °F)-37.2 °C (99 °F)]   Resp:  [16-22]   BP: ()/(46-69)   Weight:  [52.3 kg (115 lb 4.8 oz)]   SpO2:  [89 %-99 %]   Daily Weight  05/15/24 : 52.3 kg (115 lb 4.8 oz)    Body mass index is 18.62 kg/m².    Physical Exam  Constitutional:       Appearance: Normal appearance.   HENT:      Head: Normocephalic.      Nose: Nose normal.      Mouth/Throat:      Pharynx: Oropharynx is clear.   Eyes:      Extraocular Movements: Extraocular movements intact.      Conjunctiva/sclera: Conjunctivae normal.   Cardiovascular:      Rate and Rhythm: Normal rate and regular rhythm.   Pulmonary:      Effort: Pulmonary effort is normal.      Breath sounds: Normal breath sounds.   Abdominal:      General: Bowel sounds are normal.      Palpations: Abdomen is soft.   Musculoskeletal:         General: Normal range of motion.      Cervical back: Normal range of motion.   Skin:     General: Skin is warm and dry.   Neurological:      General: No focal deficit present.      Mental Status: She is alert and oriented to person, place, and time.   Psychiatric:         Mood and Affect: Mood normal.         Behavior: Behavior normal.           Antibiotics  aspirin chewable tablet 324 mg  famotidine PF (Pepcid) injection 20 mg  oxygen (O2) therapy  sodium chloride 0.9 % bolus 1,698 mL  vancomycin 1.5 g in 300 mL (Xellia) IVPB 1.5 g  furosemide (Lasix) injection 40 mg  iohexol (OMNIPaque) 350 mg iodine/mL solution 75 mL  furosemide (Lasix) injection 40 mg  aspirin chewable tablet 81 mg  acetaminophen (Tylenol) tablet 650 mg  acetaminophen (Tylenol) oral liquid 650 mg  acetaminophen (Tylenol) suppository 650  mg  enoxaparin (Lovenox) syringe 40 mg  polyethylene glycol (Glycolax, Miralax) packet 17 g  amLODIPine (Norvasc) tablet 10 mg  atorvastatin (Lipitor) tablet 20 mg  brimonidine (AlphaGAN) 0.2 % ophthalmic solution 1 drop  lisinopril tablet 20 mg  thiamine (Vitamin B-1) tablet 100 mg  piperacillin-tazobactam-dextrose (Zosyn) IV 3.375 g      methocarbamol (Robaxin) tablet  ondansetron (Zofran) tablet    pantoprazole (ProtoNix) EC tablet  hydrOXYzine (Atarax) tablet  levETIRAcetam (Keppra) 100 mg/mL solution 750 mg  hydrOXYzine HCL (Atarax) tablet 25 mg  methocarbamol (Robaxin) tablet 500 mg  ondansetron (Zofran) tablet 8 mg  sennosides (Senokot) tablet 17.2 mg  pantoprazole (ProtoNix) EC tablet 40 mg  ipratropium-albuteroL (Duo-Neb) 0.5-2.5 mg/3 mL nebulizer solution 3 mL  acetylcysteine (Mucomyst) 200 mg/mL (20 %) nebulizer solution 600 mg  fentaNYL (Duragesic) 50 mcg/hr patch 1 patch  fentaNYL (Duragesic) 12 mcg/hr patch 1 patch  gabapentin (Neurontin) solution 250 mg  gabapentin (Neurontin) solution 500 mg  methocarbamol (Robaxin) tablet 500 mg  vancomycin (Vancocin) pharmacy to dose - pharmacy monitoring  vancomycin (Xellia) 1 g in 200 mL (Xellia) IVPB 1 g  enoxaparin (Lovenox) syringe 40 mg  oxygen (O2) therapy  albumin human 5 % infusion 12.5 g  glucagon (Glucagen) injection 1 mg  dextrose 50 % injection 25 g  glucagon (Glucagen) injection 1 mg  dextrose 50 % injection 12.5 g  insulin lispro (HumaLOG) injection 0-5 Units  potassium chloride (Klor-Con) packet 20 mEq  pantoprazole (ProtoNix) EC tablet 40 mg  esomeprazole (NexIUM) suspension 40 mg  pantoprazole (ProtoNix) injection 40 mg  norepinephrine (Levophed) 8 mg in dextrose 5% 250 mL (0.032 mg/mL) infusion (premix)  methylPREDNISolone sod succinate (SOLU-Medrol) 40 mg/mL injection 40 mg  hydrocodone-homatropine (Hycodan) 5-1.5 mg/5 mL syrup 5 mL  artificial saliva (yerbas-lyt) aerosol,spray 5 mL  sennosides-docusate sodium (Shivani-Colace) 8.6-50 mg per tablet  2 tablet  lidocaine-prilocaine (Emla) cream  HYDROmorphone (Dilaudid) injection 0.2 mg  potassium phosphates 15 mmol in sodium chloride 0.9% 250 mL IV  magnesium sulfate IV 2 g  oxygen (O2) therapy  midodrine (Proamatine) tablet 10 mg  oxygen (O2) therapy  oxygen (O2) therapy  oxygen (O2) therapy  polyethylene glycol (Glycolax, Miralax) packet 17 g  psyllium (Metamucil) 3.4 gram packet 1 packet  HYDROmorphone (Dilaudid) injection 1 mg  acetylcysteine (Mucomyst) 200 mg/mL (20 %) nebulizer solution 600 mg  HYDROmorphone (Dilaudid) injection 1 mg  oxygen (O2) therapy  escitalopram (Lexapro) tablet 5 mg  escitalopram (Lexapro) tablet 10 mg  simethicone (Mylicon) chewable tablet 80 mg  simethicone (Mylicon) chewable tablet 80 mg  amoxicillin-pot clavulanate (Augmentin) 875-125 mg per tablet 1 tablet  oxygen (O2) therapy  ondansetron (Zofran) injection 4 mg  ipratropium-albuteroL (Duo-Neb) 0.5-2.5 mg/3 mL nebulizer solution 3 mL  oxyCODONE (Roxicodone) solution 10 mg  HYDROmorphone (Dilaudid) injection 1 mg  sucralfate (Carafate) suspension 1 g        escitalopram (Lexapro) tablet  escitalopram (Lexapro) tablet    pantoprazole (ProtoNix) EC tablet          methocarbamol (Robaxin) tablet  HYDROmorphone (Dilaudid) injection 0.5 mg      Relevant Results  Labs  Results from last 72 hours   Lab Units 05/15/24  0422 05/14/24  0409 05/13/24  0418   WBC AUTO x10*3/uL 15.1* 13.9* 14.3*   HEMOGLOBIN g/dL 10.1* 9.9* 10.3*   HEMATOCRIT % 32.4* 32.4* 33.4*   PLATELETS AUTO x10*3/uL 445 458* 428   LYMPHO PCT MAN % 9.0 12.0 9.0   MONO PCT MAN % 4.0 7.0 4.0   EOSINO PCT MAN % 4.0 1.0 2.0     Results from last 72 hours   Lab Units 05/15/24  0422 05/14/24  0409 05/13/24  0418   SODIUM mmol/L 131* 129* 133   POTASSIUM mmol/L 4.7 4.7 4.4   CHLORIDE mmol/L 98 97 98   CO2 mmol/L 23* 26 26   BUN mg/dL 31* 29* 37*   CREATININE mg/dL 0.80 0.80 0.90   GLUCOSE mg/dL 118* 112* 82   CALCIUM mg/dL 10.6* 10.7* 9.9   ANION GAP mmol/L 10 6 9   EGFR  "mL/min/1.73m*2 82 82 72   PHOSPHORUS mg/dL 2.5 2.9 3.0         Estimated Creatinine Clearance: 58.7 mL/min (by C-G formula based on SCr of 0.8 mg/dL).  No results found for: \"CRP\"  Microbiology  Susceptibility data from last 14 days.  Collected Specimen Info Organism Amoxicillin/Clavulanate Ampicillin Ampicillin/Sulbactam Cefazolin Cefazolin (uncomplicated UTIs only) Ceftriaxone Ciprofloxacin Clindamycin Erythromycin Gentamicin Nitrofurantoin Oxacillin   05/08/24 Tissue/Biopsy from Wound/Tissue Methicillin Susceptible Staphylococcus aureus (MSSA)         S  S    S     Mixed Anaerobic Bacteria                 Mixed Gram-Positive and Gram-Negative Bacteria               05/07/24 Urine from Clean Catch/Voided Klebsiella pneumoniae/variicola (1)  S  R  S  R  S  S  R    S  R      Klebsiella pneumoniae/variicola (2)  S  R  S  S  S   I    S  R      Collected Specimen Info Organism Piperacillin/Tazobactam Tetracycline Trimethoprim/Sulfamethoxazole Vancomycin   05/08/24 Tissue/Biopsy from Wound/Tissue Methicillin Susceptible Staphylococcus aureus (MSSA)   S  S  S     Mixed Anaerobic Bacteria         Mixed Gram-Positive and Gram-Negative Bacteria       05/07/24 Urine from Clean Catch/Voided Klebsiella pneumoniae/variicola (1)  S   S      Klebsiella pneumoniae/variicola (2)  S   S        Imaging  Reviewed     Assessment/Plan   Shock, possibly septic, off pressors-resolved  Acute hypoxic respiratory failure-differentials include lung collapse, pleural effusion, infection, on low-flow oxygen  Abnormal CT chest-pleural effusion, left-sided lung collapse, possible pneumonia  Infected PEG tube site-wound culture growing MSSA  Klebsiella urinary tract infection  Metastatic esophageal cancer        Augmentin-total of 14 days  Local care of PEG tube site  Oxygen as needed  Supportive care  Monitor temperature and WBC   Plan for transfer to [facility]     Total time spent caring for the patient today was 15 minutes.  This includes " time spent before the visit reviewing the chart, time spent during the visit, and time spent after the visit on documentation.      Trang LAUREN Staff, APRN-CNP

## 2024-05-15 NOTE — NURSING NOTE
Held tube feed for 1 hr for nausea and indigestion to resolve.  Tolerated Peg tube medication administration, reports pain to the back 10/10 medicated

## 2024-05-15 NOTE — NURSING NOTE
Pt Resting in bed at this time. Transport scheduled for [facility]. Report called to Linsey at [facility]. Nuzhat mays feeding at bedside to transport with patient. Pt appears to be resting comfortably at this time. Care continues.

## 2024-05-15 NOTE — CARE PLAN
The patient's goals for the shift include breath better    The clinical goals for the shift include pain control    Over the shift, the patient did not make progress toward the following goals. Barriers to progression include chronic pain due to CA. Recommendations to address these barriers include medicate as prescribed, reposition.

## 2024-05-15 NOTE — PROGRESS NOTES
05/15/24 1201   Discharge Planning   Patient expects to be discharged to: [facility]   What day is the transport expected? 05/15/24   What time is the transport expected? 1300

## 2024-05-17 NOTE — ED PROCEDURE NOTE
Procedure  Critical Care    Performed by: Adrianne Zuñiga MD  Authorized by: Adrianne Zuñiga MD    Critical care provider statement:     Critical care time (minutes):  35    Critical care time was exclusive of:  Separately billable procedures and treating other patients    Critical care was necessary to treat or prevent imminent or life-threatening deterioration of the following conditions:  Respiratory failure    Critical care was time spent personally by me on the following activities:  Development of treatment plan with patient or surrogate, evaluation of patient's response to treatment, examination of patient, obtaining history from patient or surrogate, ordering and performing treatments and interventions, ordering and review of laboratory studies, ordering and review of radiographic studies, pulse oximetry, re-evaluation of patient's condition and review of old charts    Care discussed with: admitting provider                 Adrianne Zuñiga MD  05/17/24 0940

## 2024-05-22 NOTE — DISCHARGE SUMMARY
Discharge Diagnosis  Sepsis, due to unspecified organism, unspecified whether acute organ dysfunction present (Multi)    Issues Requiring Follow-Up      Discharge Meds     Your medication list        START taking these medications        Instructions Last Dose Given Next Dose Due   amoxicillin-pot clavulanate 875-125 mg tablet  Commonly known as: Augmentin      1 tablet by g-tube route every 12 hours for 7 days.       escitalopram 5 mg tablet  Commonly known as: Lexapro      1 tablet (5 mg) by g-tube route once daily for 4 doses.       escitalopram 10 mg tablet  Commonly known as: Lexapro  Start taking on: May 19, 2024      1 tablet (10 mg) by g-tube route once daily. Do not fill before May 19, 2024.       fentaNYL 50 mcg/hr patch  Commonly known as: Duragesic  Start taking on: May 16, 2024      Place 1 patch over 72 hours on the skin every 3rd day.       oxyCODONE 5 mg/5 mL solution  Commonly known as: Roxicodone      Take 10 mL (10 mg) by mouth every 3 hours if needed for severe pain (7 - 10).       psyllium 3.4 gram packet  Commonly known as: Metamucil      Take 1 packet by mouth once daily.       sennosides-docusate sodium 8.6-50 mg tablet  Commonly known as: Shivani-Colace      2 tablets by g-tube route 2 times a day.       sucralfate 100 mg/mL suspension  Commonly known as: Carafate      Take 10 mL (1 g) by mouth every 6 hours.              CHANGE how you take these medications        Instructions Last Dose Given Next Dose Due   methocarbamol 500 mg tablet  Commonly known as: Robaxin  What changed:   when to take this  reasons to take this      1 tablet (500 mg) by g-tube route every 8 hours if needed for muscle spasms.       gabapentin 250 mg/5 mL solution  Commonly known as: Neurontin  What changed:   medication strength  how much to take  how to take this  when to take this      Take 5 mL (250 mg) by mouth 2 times a day.       gabapentin 250 mg/5 mL solution  Commonly known as: Neurontin  What changed: You  were already taking a medication with the same name, and this prescription was added. Make sure you understand how and when to take each.      Take 10 mL (500 mg) by mouth once daily at bedtime.       pantoprazole 40 mg EC tablet  Commonly known as: ProtoNix  What changed: when to take this      Take 1 tablet (40 mg) by mouth once daily. Do not crush, chew, or split.              CONTINUE taking these medications        Instructions Last Dose Given Next Dose Due   atorvastatin 20 mg tablet  Commonly known as: Lipitor           brimonidine 0.2 % ophthalmic solution  Commonly known as: AlphaGAN           cholecalciferol 25 MCG (1000 UT) tablet  Commonly known as: Vitamin D-3           hydrOXYzine HCL 25 mg tablet  Commonly known as: Atarax           levETIRAcetam 100 mg/mL solution  Commonly known as: Keppra           ondansetron 8 mg tablet  Commonly known as: Zofran           thiamine 100 mg tablet  Commonly known as: Vitamin B-1                  STOP taking these medications      acetaminophen 325 mg tablet  Commonly known as: Tylenol        amLODIPine 10 mg tablet  Commonly known as: Norvasc        famotidine 20 mg tablet  Commonly known as: Pepcid        folic acid 1 mg tablet  Commonly known as: Folvite        lisinopril 20 mg tablet        sennosides 8.6 mg tablet  Commonly known as: Senokot        UNABLE TO FIND        Vyzulta 0.024 % drops  Generic drug: latanoprostene bunod                  Where to Get Your Medications        Information about where to get these medications is not yet available    Ask your nurse or doctor about these medications  amoxicillin-pot clavulanate 875-125 mg tablet  escitalopram 10 mg tablet  escitalopram 5 mg tablet  fentaNYL 50 mcg/hr patch  gabapentin 250 mg/5 mL solution  gabapentin 250 mg/5 mL solution  methocarbamol 500 mg tablet  oxyCODONE 5 mg/5 mL solution  pantoprazole 40 mg EC tablet  psyllium 3.4 gram packet  sennosides-docusate sodium 8.6-50 mg tablet  sucralfate 100  mg/mL suspension         Test Results Pending At Discharge  Pending Labs       No current pending labs.            Hospital Course   Debbi Segura is a 63 y.o. female presenting with hypoxia and shortness of breath from nursing home.  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  She also underwent debulking of airway in 2023.  She was admitted to [facility] for altered mental status and was found to have brain metastatic disease.  She was started on Keppra and had a gamma knife procedure.  Subsequently conversations have ensued with hospice.  She was discharged back to the skilled nursing facility.  Staff noted that she had sudden worsening of breathing and dyspnea at rest.  She was hypoxic.  She was sent to the hospital and found to have a large pleural effusion.  She was also febrile.  She was given antibiotics, placed on high flow oxygen, and admitted for further treatment.    The patient was followed by pulm, pall med and ID. Goals of care were d/w patient and family, she was made a DNR comfort care. She was discharged with hospice services in place.     Pertinent Physical Exam At Time of Discharge      Outpatient Follow-Up  No future appointments.    Time and care for discharge management > 30 minutes.     Greta Jain, APRN-CNP

## 2024-06-25 LAB
ATRIAL RATE: 116 BPM
P AXIS: 61 DEGREES
P OFFSET: 211 MS
P ONSET: 163 MS
PR INTERVAL: 128 MS
Q ONSET: 227 MS
QRS COUNT: 19 BEATS
QRS DURATION: 68 MS
QT INTERVAL: 316 MS
QTC CALCULATION(BAZETT): 439 MS
QTC FREDERICIA: 393 MS
R AXIS: 23 DEGREES
T AXIS: 76 DEGREES
T OFFSET: 385 MS
VENTRICULAR RATE: 116 BPM

## 2024-10-01 NOTE — NURSING NOTE
Patient with Rt chest mediport, accessed with needle, flushes easily and with positive blood return, clamped and curos cap applied. Needle due to be changed today. Discussed with care coordination and pallative med NP, patient may transition to hospice. Will re-evaluate and follow up with RN once hospice meeting has completed.   normal...